# Patient Record
Sex: FEMALE | Race: WHITE | NOT HISPANIC OR LATINO | Employment: UNEMPLOYED | URBAN - METROPOLITAN AREA
[De-identification: names, ages, dates, MRNs, and addresses within clinical notes are randomized per-mention and may not be internally consistent; named-entity substitution may affect disease eponyms.]

---

## 2017-01-16 ENCOUNTER — OFFICE VISIT - HEALTHEAST (OUTPATIENT)
Dept: BEHAVIORAL HEALTH | Facility: CLINIC | Age: 57
End: 2017-01-16

## 2017-01-16 DIAGNOSIS — F06.30 MOOD DISORDER IN CONDITIONS CLASSIFIED ELSEWHERE: ICD-10-CM

## 2017-02-13 ENCOUNTER — COMMUNICATION - HEALTHEAST (OUTPATIENT)
Dept: BEHAVIORAL HEALTH | Facility: CLINIC | Age: 57
End: 2017-02-13

## 2017-02-13 DIAGNOSIS — F06.30 MOOD DISORDER IN CONDITIONS CLASSIFIED ELSEWHERE: ICD-10-CM

## 2017-02-13 DIAGNOSIS — F41.9 ANXIETY: ICD-10-CM

## 2017-02-14 ENCOUNTER — COMMUNICATION - HEALTHEAST (OUTPATIENT)
Dept: BEHAVIORAL HEALTH | Facility: CLINIC | Age: 57
End: 2017-02-14

## 2017-02-14 DIAGNOSIS — F41.9 ANXIETY: ICD-10-CM

## 2017-03-20 ENCOUNTER — COMMUNICATION - HEALTHEAST (OUTPATIENT)
Dept: BEHAVIORAL HEALTH | Facility: CLINIC | Age: 57
End: 2017-03-20

## 2017-03-20 DIAGNOSIS — F41.9 ANXIETY: ICD-10-CM

## 2017-06-26 ENCOUNTER — OFFICE VISIT - HEALTHEAST (OUTPATIENT)
Dept: BEHAVIORAL HEALTH | Facility: CLINIC | Age: 57
End: 2017-06-26

## 2017-06-26 DIAGNOSIS — F06.30 MOOD DISORDER IN CONDITIONS CLASSIFIED ELSEWHERE: ICD-10-CM

## 2017-06-26 ASSESSMENT — MIFFLIN-ST. JEOR: SCORE: 1381.43

## 2017-07-20 ENCOUNTER — COMMUNICATION - HEALTHEAST (OUTPATIENT)
Dept: BEHAVIORAL HEALTH | Facility: CLINIC | Age: 57
End: 2017-07-20

## 2017-07-20 DIAGNOSIS — F41.9 ANXIETY: ICD-10-CM

## 2017-09-08 ENCOUNTER — COMMUNICATION - HEALTHEAST (OUTPATIENT)
Dept: BEHAVIORAL HEALTH | Facility: CLINIC | Age: 57
End: 2017-09-08

## 2017-09-08 DIAGNOSIS — F41.9 ANXIETY: ICD-10-CM

## 2017-09-25 ENCOUNTER — OFFICE VISIT - HEALTHEAST (OUTPATIENT)
Dept: BEHAVIORAL HEALTH | Facility: CLINIC | Age: 57
End: 2017-09-25

## 2017-09-25 DIAGNOSIS — F06.30 MOOD DISORDER IN CONDITIONS CLASSIFIED ELSEWHERE: ICD-10-CM

## 2018-01-09 ENCOUNTER — COMMUNICATION - HEALTHEAST (OUTPATIENT)
Dept: BEHAVIORAL HEALTH | Facility: CLINIC | Age: 58
End: 2018-01-09

## 2018-01-09 DIAGNOSIS — F41.9 ANXIETY: ICD-10-CM

## 2018-01-10 ENCOUNTER — COMMUNICATION - HEALTHEAST (OUTPATIENT)
Dept: BEHAVIORAL HEALTH | Facility: CLINIC | Age: 58
End: 2018-01-10

## 2018-01-10 DIAGNOSIS — F41.9 ANXIETY: ICD-10-CM

## 2018-03-07 ENCOUNTER — COMMUNICATION - HEALTHEAST (OUTPATIENT)
Dept: BEHAVIORAL HEALTH | Facility: CLINIC | Age: 58
End: 2018-03-07

## 2018-03-07 DIAGNOSIS — F41.9 ANXIETY: ICD-10-CM

## 2018-03-26 ENCOUNTER — OFFICE VISIT - HEALTHEAST (OUTPATIENT)
Dept: BEHAVIORAL HEALTH | Facility: CLINIC | Age: 58
End: 2018-03-26

## 2018-03-26 DIAGNOSIS — F06.30 MOOD DISORDER IN CONDITIONS CLASSIFIED ELSEWHERE: ICD-10-CM

## 2018-06-07 ENCOUNTER — COMMUNICATION - HEALTHEAST (OUTPATIENT)
Dept: BEHAVIORAL HEALTH | Facility: CLINIC | Age: 58
End: 2018-06-07

## 2018-06-07 DIAGNOSIS — F41.9 ANXIETY: ICD-10-CM

## 2018-07-06 ENCOUNTER — COMMUNICATION - HEALTHEAST (OUTPATIENT)
Dept: BEHAVIORAL HEALTH | Facility: CLINIC | Age: 58
End: 2018-07-06

## 2018-07-06 DIAGNOSIS — F41.9 ANXIETY: ICD-10-CM

## 2018-07-26 ENCOUNTER — COMMUNICATION - HEALTHEAST (OUTPATIENT)
Dept: BEHAVIORAL HEALTH | Facility: CLINIC | Age: 58
End: 2018-07-26

## 2018-07-26 DIAGNOSIS — F06.30 MOOD DISORDER IN CONDITIONS CLASSIFIED ELSEWHERE: ICD-10-CM

## 2018-08-22 ENCOUNTER — COMMUNICATION - HEALTHEAST (OUTPATIENT)
Dept: BEHAVIORAL HEALTH | Facility: CLINIC | Age: 58
End: 2018-08-22

## 2018-08-22 DIAGNOSIS — F06.30 MOOD DISORDER IN CONDITIONS CLASSIFIED ELSEWHERE: ICD-10-CM

## 2018-09-07 ENCOUNTER — COMMUNICATION - HEALTHEAST (OUTPATIENT)
Dept: BEHAVIORAL HEALTH | Facility: CLINIC | Age: 58
End: 2018-09-07

## 2018-09-07 DIAGNOSIS — F06.30 MOOD DISORDER IN CONDITIONS CLASSIFIED ELSEWHERE: ICD-10-CM

## 2018-09-24 ENCOUNTER — OFFICE VISIT - HEALTHEAST (OUTPATIENT)
Dept: BEHAVIORAL HEALTH | Facility: CLINIC | Age: 58
End: 2018-09-24

## 2018-09-24 DIAGNOSIS — F41.9 ANXIETY: ICD-10-CM

## 2018-09-24 DIAGNOSIS — F06.30 MOOD DISORDER IN CONDITIONS CLASSIFIED ELSEWHERE: ICD-10-CM

## 2018-09-24 ASSESSMENT — MIFFLIN-ST. JEOR: SCORE: 1363.28

## 2018-10-03 ENCOUNTER — COMMUNICATION - HEALTHEAST (OUTPATIENT)
Dept: BEHAVIORAL HEALTH | Facility: CLINIC | Age: 58
End: 2018-10-03

## 2018-10-03 DIAGNOSIS — F06.30 MOOD DISORDER IN CONDITIONS CLASSIFIED ELSEWHERE: ICD-10-CM

## 2018-10-08 ENCOUNTER — COMMUNICATION - HEALTHEAST (OUTPATIENT)
Dept: BEHAVIORAL HEALTH | Facility: CLINIC | Age: 58
End: 2018-10-08

## 2018-10-08 DIAGNOSIS — F41.9 ANXIETY: ICD-10-CM

## 2018-12-10 ENCOUNTER — COMMUNICATION - HEALTHEAST (OUTPATIENT)
Dept: BEHAVIORAL HEALTH | Facility: CLINIC | Age: 58
End: 2018-12-10

## 2019-01-07 ENCOUNTER — OFFICE VISIT - HEALTHEAST (OUTPATIENT)
Dept: BEHAVIORAL HEALTH | Facility: CLINIC | Age: 59
End: 2019-01-07

## 2019-01-07 DIAGNOSIS — F41.9 ANXIETY: ICD-10-CM

## 2019-01-07 DIAGNOSIS — F06.30 MOOD DISORDER IN CONDITIONS CLASSIFIED ELSEWHERE: ICD-10-CM

## 2019-01-07 ASSESSMENT — MIFFLIN-ST. JEOR: SCORE: 1336.07

## 2019-01-09 ENCOUNTER — COMMUNICATION - HEALTHEAST (OUTPATIENT)
Dept: BEHAVIORAL HEALTH | Facility: CLINIC | Age: 59
End: 2019-01-09

## 2019-01-24 ENCOUNTER — COMMUNICATION - HEALTHEAST (OUTPATIENT)
Dept: SCHEDULING | Facility: CLINIC | Age: 59
End: 2019-01-24

## 2019-01-24 DIAGNOSIS — R06.2 WHEEZING: ICD-10-CM

## 2019-03-01 ENCOUNTER — COMMUNICATION - HEALTHEAST (OUTPATIENT)
Dept: BEHAVIORAL HEALTH | Facility: CLINIC | Age: 59
End: 2019-03-01

## 2019-03-01 DIAGNOSIS — F06.30 MOOD DISORDER IN CONDITIONS CLASSIFIED ELSEWHERE: ICD-10-CM

## 2019-04-08 ENCOUNTER — OFFICE VISIT - HEALTHEAST (OUTPATIENT)
Dept: BEHAVIORAL HEALTH | Facility: CLINIC | Age: 59
End: 2019-04-08

## 2019-04-08 DIAGNOSIS — F06.30 MOOD DISORDER IN CONDITIONS CLASSIFIED ELSEWHERE: ICD-10-CM

## 2019-04-08 DIAGNOSIS — F41.9 ANXIETY: ICD-10-CM

## 2019-04-08 ASSESSMENT — MIFFLIN-ST. JEOR: SCORE: 1263.49

## 2019-06-03 ENCOUNTER — COMMUNICATION - HEALTHEAST (OUTPATIENT)
Dept: BEHAVIORAL HEALTH | Facility: CLINIC | Age: 59
End: 2019-06-03

## 2019-06-03 DIAGNOSIS — F39 MOOD DISORDER (H): ICD-10-CM

## 2019-06-27 ENCOUNTER — COMMUNICATION - HEALTHEAST (OUTPATIENT)
Dept: BEHAVIORAL HEALTH | Facility: CLINIC | Age: 59
End: 2019-06-27

## 2019-06-27 DIAGNOSIS — F41.9 ANXIETY: ICD-10-CM

## 2019-07-09 ENCOUNTER — COMMUNICATION - HEALTHEAST (OUTPATIENT)
Dept: BEHAVIORAL HEALTH | Facility: CLINIC | Age: 59
End: 2019-07-09

## 2019-07-09 DIAGNOSIS — F41.9 ANXIETY: ICD-10-CM

## 2019-07-15 ENCOUNTER — OFFICE VISIT - HEALTHEAST (OUTPATIENT)
Dept: BEHAVIORAL HEALTH | Facility: CLINIC | Age: 59
End: 2019-07-15

## 2019-07-15 ENCOUNTER — AMBULATORY - HEALTHEAST (OUTPATIENT)
Dept: OTHER | Facility: CLINIC | Age: 59
End: 2019-07-15

## 2019-07-15 ENCOUNTER — DOCUMENTATION ONLY (OUTPATIENT)
Dept: OTHER | Facility: CLINIC | Age: 59
End: 2019-07-15

## 2019-07-15 DIAGNOSIS — F06.30 MOOD DISORDER IN CONDITIONS CLASSIFIED ELSEWHERE: ICD-10-CM

## 2019-07-15 DIAGNOSIS — F39 MOOD DISORDER (H): ICD-10-CM

## 2019-07-15 DIAGNOSIS — F41.9 ANXIETY: ICD-10-CM

## 2019-07-15 ASSESSMENT — MIFFLIN-ST. JEOR: SCORE: 1145.56

## 2019-07-16 ENCOUNTER — COMMUNICATION - HEALTHEAST (OUTPATIENT)
Dept: BEHAVIORAL HEALTH | Facility: CLINIC | Age: 59
End: 2019-07-16

## 2019-07-16 DIAGNOSIS — F39 MOOD DISORDER (H): ICD-10-CM

## 2019-08-12 ENCOUNTER — COMMUNICATION - HEALTHEAST (OUTPATIENT)
Dept: NEUROLOGY | Facility: CLINIC | Age: 59
End: 2019-08-12

## 2019-08-12 ENCOUNTER — COMMUNICATION - HEALTHEAST (OUTPATIENT)
Dept: BEHAVIORAL HEALTH | Facility: CLINIC | Age: 59
End: 2019-08-12

## 2019-08-12 DIAGNOSIS — F41.9 ANXIETY: ICD-10-CM

## 2019-08-13 ENCOUNTER — COMMUNICATION - HEALTHEAST (OUTPATIENT)
Dept: BEHAVIORAL HEALTH | Facility: CLINIC | Age: 59
End: 2019-08-13

## 2019-08-13 DIAGNOSIS — F41.9 ANXIETY: ICD-10-CM

## 2019-09-05 ENCOUNTER — COMMUNICATION - HEALTHEAST (OUTPATIENT)
Dept: BEHAVIORAL HEALTH | Facility: CLINIC | Age: 59
End: 2019-09-05

## 2019-09-05 DIAGNOSIS — F41.9 ANXIETY: ICD-10-CM

## 2019-10-14 ENCOUNTER — OFFICE VISIT - HEALTHEAST (OUTPATIENT)
Dept: BEHAVIORAL HEALTH | Facility: CLINIC | Age: 59
End: 2019-10-14

## 2019-10-14 DIAGNOSIS — F39 MOOD DISORDER (H): ICD-10-CM

## 2019-10-14 ASSESSMENT — MIFFLIN-ST. JEOR: SCORE: 1163.7

## 2019-12-20 ENCOUNTER — COMMUNICATION - HEALTHEAST (OUTPATIENT)
Dept: BEHAVIORAL HEALTH | Facility: CLINIC | Age: 59
End: 2019-12-20

## 2019-12-20 DIAGNOSIS — F41.9 ANXIETY: ICD-10-CM

## 2019-12-28 ENCOUNTER — COMMUNICATION - HEALTHEAST (OUTPATIENT)
Dept: BEHAVIORAL HEALTH | Facility: CLINIC | Age: 59
End: 2019-12-28

## 2019-12-28 DIAGNOSIS — F06.30 MOOD DISORDER IN CONDITIONS CLASSIFIED ELSEWHERE: ICD-10-CM

## 2019-12-30 ENCOUNTER — COMMUNICATION - HEALTHEAST (OUTPATIENT)
Dept: BEHAVIORAL HEALTH | Facility: CLINIC | Age: 59
End: 2019-12-30

## 2019-12-30 DIAGNOSIS — F41.9 ANXIETY: ICD-10-CM

## 2020-01-06 ENCOUNTER — COMMUNICATION - HEALTHEAST (OUTPATIENT)
Dept: BEHAVIORAL HEALTH | Facility: CLINIC | Age: 60
End: 2020-01-06

## 2020-01-06 DIAGNOSIS — F41.9 ANXIETY: ICD-10-CM

## 2020-01-25 ENCOUNTER — COMMUNICATION - HEALTHEAST (OUTPATIENT)
Dept: BEHAVIORAL HEALTH | Facility: CLINIC | Age: 60
End: 2020-01-25

## 2020-01-25 DIAGNOSIS — F06.30 MOOD DISORDER IN CONDITIONS CLASSIFIED ELSEWHERE: ICD-10-CM

## 2020-04-08 ENCOUNTER — COMMUNICATION - HEALTHEAST (OUTPATIENT)
Dept: BEHAVIORAL HEALTH | Facility: CLINIC | Age: 60
End: 2020-04-08

## 2020-04-13 ENCOUNTER — COMMUNICATION - HEALTHEAST (OUTPATIENT)
Dept: BEHAVIORAL HEALTH | Facility: CLINIC | Age: 60
End: 2020-04-13

## 2020-04-13 DIAGNOSIS — F39 MOOD DISORDER (H): ICD-10-CM

## 2020-07-07 ENCOUNTER — COMMUNICATION - HEALTHEAST (OUTPATIENT)
Dept: BEHAVIORAL HEALTH | Facility: CLINIC | Age: 60
End: 2020-07-07

## 2020-07-07 DIAGNOSIS — F41.9 ANXIETY: ICD-10-CM

## 2020-07-08 ENCOUNTER — COMMUNICATION - HEALTHEAST (OUTPATIENT)
Dept: BEHAVIORAL HEALTH | Facility: CLINIC | Age: 60
End: 2020-07-08

## 2020-07-08 DIAGNOSIS — F41.9 ANXIETY: ICD-10-CM

## 2020-07-13 ENCOUNTER — OFFICE VISIT - HEALTHEAST (OUTPATIENT)
Dept: BEHAVIORAL HEALTH | Facility: CLINIC | Age: 60
End: 2020-07-13

## 2020-07-13 DIAGNOSIS — F06.30 MOOD DISORDER IN CONDITIONS CLASSIFIED ELSEWHERE: ICD-10-CM

## 2020-07-13 DIAGNOSIS — F39 MOOD DISORDER (H): ICD-10-CM

## 2020-07-13 DIAGNOSIS — F41.9 ANXIETY: ICD-10-CM

## 2020-09-14 ENCOUNTER — OFFICE VISIT - HEALTHEAST (OUTPATIENT)
Dept: BEHAVIORAL HEALTH | Facility: CLINIC | Age: 60
End: 2020-09-14

## 2020-09-14 ENCOUNTER — AMBULATORY - HEALTHEAST (OUTPATIENT)
Dept: MULTI SPECIALTY CLINIC | Facility: CLINIC | Age: 60
End: 2020-09-14

## 2020-09-14 DIAGNOSIS — E78.01 FAMILIAL HYPERCHOLESTEROLEMIA: ICD-10-CM

## 2020-09-14 DIAGNOSIS — F39 MOOD DISORDER (H): ICD-10-CM

## 2020-09-23 ENCOUNTER — AMBULATORY - HEALTHEAST (OUTPATIENT)
Dept: MULTI SPECIALTY CLINIC | Facility: CLINIC | Age: 60
End: 2020-09-23

## 2020-09-23 LAB
ALT SERPL W/O P-5'-P-CCNC: 18 IU/L (ref 8–45)
AST SERPL-CCNC: 28 IU/L (ref 2–40)
CHOLEST SERPL-MCNC: 237 MG/DL (ref 100–199)
CREAT SERPL-MCNC: 0.63 MG/DL (ref 0.57–1.11)
GFR ESTIMATE EXT - HISTORICAL: >60 ML/MIN/1.73M2
GFR ESTIMATE, IF BLACK EXT - HISTORICAL: >60 ML/MIN/1.73M2
HDLC SERPL-MCNC: 67 MG/DL
LDLC SERPL CALC-MCNC: 137 MG/DL
NON HDL CHOL. (LDL+VLDL): 170
TRIGLYCERIDES (HISTORICAL CONVERSION): 167 MG/DL

## 2020-09-24 ENCOUNTER — COMMUNICATION - HEALTHEAST (OUTPATIENT)
Dept: BEHAVIORAL HEALTH | Facility: CLINIC | Age: 60
End: 2020-09-24

## 2021-01-29 ENCOUNTER — MEDICAL CORRESPONDENCE (OUTPATIENT)
Dept: HEALTH INFORMATION MANAGEMENT | Facility: CLINIC | Age: 61
End: 2021-01-29
Payer: MEDICARE

## 2021-03-15 ENCOUNTER — OFFICE VISIT - HEALTHEAST (OUTPATIENT)
Dept: BEHAVIORAL HEALTH | Facility: CLINIC | Age: 61
End: 2021-03-15

## 2021-03-15 DIAGNOSIS — F06.30 MOOD DISORDER IN CONDITIONS CLASSIFIED ELSEWHERE: ICD-10-CM

## 2021-03-15 DIAGNOSIS — F39 MOOD DISORDER (H): ICD-10-CM

## 2021-03-15 DIAGNOSIS — F41.9 ANXIETY: ICD-10-CM

## 2021-05-18 ENCOUNTER — COMMUNICATION - HEALTHEAST (OUTPATIENT)
Dept: BEHAVIORAL HEALTH | Facility: CLINIC | Age: 61
End: 2021-05-18

## 2021-05-18 DIAGNOSIS — F41.9 ANXIETY: ICD-10-CM

## 2021-05-27 NOTE — PROGRESS NOTES
Correct pharmacy verified with patient and confirmed in snapshot? [x] yes []no    Charge captured ? [x] yes  [] no    Medications Phoned  to Pharmacy [] yes [x]no  Name of Pharmacist:  List Medications, including dose, quantity and instructions      Medication Prescriptions given to patient   [] yes  [x] no   List the name of the drug the prescription was written for.       Medications ordered this visit were e-scribed.  Verified by order class [x] yes  [] no  Klonopin 0.5 mg; melatonin 10 mg   Medication changes or discontinuations were communicated to patient's pharmacy: [] yes  [x] no    UA collected [] yes  [x] no    [place] Prescription Monitoring Program Reviewed? [x] yes  [] no    Referrals were made to:  None     Future appointment was made: [] yes  [x] no    Dictation completed at time of chart check: [x] yes  [] no    I have checked the documentation for today s encounters and the above information has been reviewed and completed.

## 2021-05-27 NOTE — PROGRESS NOTES
Outpatient Followup Psychiatric Evaluation      Pertinent History: She has a long-standing history of developmental delay as well as atypical psychosis and a history of very significant behavioral dyscontrol and mood instability.  Over the winter 2018 we did increase the Remeron to 30 mg.      I saw the patient in September 2018.  At that time we increased the patient's Risperdal as well as the patient's melatonin due to some insomnia and behavioral difficulties.    I saw the patient in January 2018.  At that time we did taper off the patient's Risperdal over the next couple of weeks due to some ongoing behavioral difficulties despite that trial.  She had had a seizure in September and frequent urinary tract infections since then which may have contributed.  She was staying in bed much of the day despite sleeping well at night and was combative with redirection at that time.       Current Symptoms:   The patient is unable to provide any history but 2 staff members accompany the patient today.  They report the patient continues to be restless and difficult to redirect agitated at times.  Since the discontinuation of the Risperdal that has not improved.  What has changed is she is more alert during the day.  She will occasionally go lay down in her room for 45 minutes or so but she is not spending the day trying to go back to bed as she was before.  There is been no significant change in cognition.  She seems to be sleeping well at night.  No obvious psychosis.  No self-injurious behaviors but she continues to be quite active.  She seems to be tolerating the medication with no obvious side effects.  There is been no new medical issues.  There is been no recent UTIs as there were last fall.    We discussed the possibility of trying another mood stabilizing agent.  The staff present would like to give the patient a bit more time off the Risperdal and regroup with us in 3 months.  At this time we will continue with the  Celexa and the Klonopin.      Current Medications: Please see chart. Medications personally reviewed.    Medication Compliance: YES    Side Effects to Medications:  NO      Vitals:  Wt Readings from Last 3 Encounters:   04/08/19 156 lb (70.8 kg)   01/24/19 174 lb 12.8 oz (79.3 kg)   01/18/19 171 lb 8 oz (77.8 kg)     Temp Readings from Last 3 Encounters:   01/24/19 97.8  F (36.6  C) (Oral)   01/22/19 98.8  F (37.1  C) (Axillary)   01/06/15 97.6  F (36.4  C) (Axillary)     BP Readings from Last 3 Encounters:   01/24/19 136/66   01/22/19 115/86   09/24/18 104/52     Pulse Readings from Last 3 Encounters:   01/24/19 85   01/22/19 84   09/24/18 72         Mental Status Exam:   Appearance: The patient is inattentive and not able to participate.  He was quite restless and constantly got up pacing around the room.  She was difficult to redirect.  She overturned the magazines and ripped some paper.  She tried to grab various items on the desk and elsewhere in the room.  No obvious pain.  No shortness of breath. No restlessness. The patient does not respond to voice. No obvious shortness of breath.  Behavior:  The patient does not appear to be uncomfortable. Not able to participate.  Quite restless.  No reports of any recent significant change in behavior.  Difficult to redirect.  Speech:  Not able to participate at this time. The patient does not respond to voice.  Occasionally vocalizes but not in an attempt to communicate and not recognizable.  The patient makes no attempts to communicate.  Mood/Affect:  Flat and slow.  The patient does not appear to be agitated. No obvious anxiety. No lability at this time.  Thought Content:  No evidence of psychosis. No reports of any recent psychosis.  Suicidal or Homicidal Thoughts:  The patient is not able to participate. No apparent or reported evidence of any suicidal or homicidal ideation.  Thought Process/Formulation:  Inattentive.  Distractible and disinterested.  Not able to  participate.   Associations:  Inattentive. Not able to participate.  Fund of Knowledge:  Not able to participate. No reports of any recent significant change.  Attention/Concentration:  Inattentive. No participation.  Insight:  Not able to participate.  Reportedly continues impaired with no reports of any recent change.   Judgement:  Not able to participate at this time.  Reportedly continues impaired.   Memory:  No participation.   Motor Status:  Currently not able to participate. No tremor.   Orientation: Not able to participate.  Continues impaired by report.  Not able to demonstrate orientation.      Diagnosis managed and treated at today's visit :    Major neurocognitive disorder disorder, NOS with resultant mood disturbance and behavioral dyscontrol.  Atypical psychosis    Developmental delay, by history     Plan:  Medication Adjustment:  We will continue the patient off Risperdal at this time.  We will make no changes and see how she does over the course of the next couple of months.  I am considering a trial of Abilify.    Other:   Patient will return to clinic in 3 months for med check.  The staff agree to call or return sooner if questions concerns or problems arise.      Continue with the support of the clinic, reassurance, and redirection. Staff monitoring and ongoing assessments per team plan. Current psychotropic medication appears to represent the minimum effective dosage and appears medically necessary. We will continue to monitor and reassess. This team will utilize appropriate emergency services if necessary. I will make myself available if concerns or problems arise.    Casa Bustos MD

## 2021-05-27 NOTE — PROGRESS NOTES
Pt is here for psychiatric follow up and medication management.  Presents with 2 staff from [facility].    Pt was unable to sit still long enough to obtain vitals.  Did try.  Pt is non-verbal and is anxious in office setting.     Since last visit stopped taking Risperdal.   Sleep has improved during the night also taking daytime naps.  Pt has been more vocal and with increased voice volume.  Decreased desire to eat resulting in some weight lost per staff.  Increased thirst  Increased grabbing items that are not hers but no self injury behaviors observed.       [place] :  Unable to check activity.

## 2021-05-29 NOTE — TELEPHONE ENCOUNTER
[facility] was called and informed of addition of Abilify to patient's medication list. Order was sent to [facility].

## 2021-05-29 NOTE — TELEPHONE ENCOUNTER
Rylee, [facility] staff person, called requesting medication change. She said at patient's April appt, it was discussed that Abilify could be added. She said that the  team would like to see that added now and then patient be evaluated at July appt.with Dr Bustos. Instructed that request would be sent to .

## 2021-05-30 NOTE — TELEPHONE ENCOUNTER
Date of Last Office Visit:4-8-19  Date of Next Office Visit: 7-15-19  No shows since last visit: 0  Cancellations since last visit: 0  ED visits since last visit:  0  Medication celexa date last ordered: 1-7-19  Qty: 42  Refills: 10  Lapse in therapy greater than 7 days: no  Medication refill request verified as identical to current order: yes  Result of Last DAM, VPA, Li+ Level, CBC, or Carbamazepine Level (at or since last visit): N/A     [] Medication refilled per [facility] M-1.   [x] Medication unable to be refilled by RN due to criteria not met as indicated below:     []Eligibility - not seen in last year    []Supervision - no future appointment    []Compliance     []Verification - order discrepancy    []Controlled Medication    []Medication not included in RN Protocol    []90 - day supply request    [x]Other requesting additional refills   Current Medication list:    albuterol (ACCUNEB) 1.25 mg/3 mL nebulizer solution Take 3 mL (1.25 mg total) by nebulization 3 (three) times a day.   ARIPiprazole (ABILIFY) 2 MG tablet Take 1 tablet (2 mg total) by mouth daily.   calcium carbonate-vit D3-min (CALCIUM-VITAMIN D) 600 mg calcium- 400 unit Tab Take 1 tablet by mouth 2 (two) times a day.    carBAMazepine (TEGRETOL) 200 mg tablet Take 200 mg by mouth 2 (two) times a day. Take 1 tab at AM and 1500, and 1.5 tabs at bedtime        CHLORHEXIDINE GLUCONATE MM 1 application by Mucous Membrane route every morning.   ciprofloxacin-dexamethasone (CIPRODEX) otic suspension Administer 4 drops into both ears 2 (two) times a day as needed.        citalopram (CELEXA) 40 MG tablet TAKE ONE AND ONE-HALF TABLETS (60MG) BY MOUTH ONCE DAILY   clonazePAM (KLONOPIN) 0.5 MG tablet Take 1 tablet (0.5 mg total) by mouth at bedtime.   clotrimazole-betamethasone (LOTRISONE) cream Apply 1 application topically as needed.   cycloSPORINE (RESTASIS) 0.05 % ophthalmic emulsion Administer 1 drop to both eyes 2 (two) times a day.        diphenhydrAMINE  (BENADRYL) 25 mg capsule Take 25 mg by mouth every 6 (six) hours as needed for itching.        divalproex (DEPAKOTE) 500 MG EC tablet Take 1,000 mg by mouth 3 (three) times a day. AM, 1500, HS        docusate sodium (COLACE) 100 MG capsule Take 200 mg by mouth 2 (two) times a day.   esomeprazole (NEXIUM) 20 MG capsule Take 40 mg by mouth daily before breakfast.        hydrocortisone 1 % cream Apply 1 application topically 2 (two) times a day as needed.   levOCARNitine (CARNITOR) 330 mg tablet Take 330 mg by mouth 3 (three) times a day. AM, 1500, HS        LORazepam (ATIVAN) 2 MG tablet Take 2 mg by mouth as needed for anxiety (1 hour prior to procedures that would be upsetting.).   melatonin 10 mg Tab Take 10 mg by mouth at bedtime.   mirtazapine (REMERON) 30 MG tablet Take 1 tablet (30 mg total) by mouth at bedtime.   MULTIVIT WITH CALCIUM,IRON,MIN (TAB A TENZIN ORAL) Take 1 tablet by mouth daily.   senna (SENOKOT) 8.6 mg tablet Take 1 tablet by mouth 2 (two) times a day.   sodium fluoride (DENTAGEL) 1.1 % Gel dental gel Apply 1 application to teeth every morning.        zolpidem (AMBIEN) 10 mg tablet TAKE 1 TABLET BY MOUTH AT BEDTIME. *6 TOTAL FILLS*   Allergies     Drixoral   Sudafed [pseudoephedrine Hcl]   Preferred Pharmacy     [address] [facility]. [facility] [address]   Phone: [phone] Fax: [phone]   Not a 24 hour pharmacy; exact hours not known.       Medication Plan of Care at last office visit with MD/CNP:  We will continue the patient off Risperdal at this time.  We will make no changes and see how she does over the course of the next couple of months.  I am considering a trial of Abilify.     Other:   Patient will return to clinic in 3 months for med check.  The staff agree to call or return sooner if questions concerns or problems arise.       Continue with the support of the clinic, reassurance, and redirection. Staff monitoring  and ongoing assessments per team plan. Current psychotropic medication appears to represent the minimum effective dosage and appears medically necessary. We will continue to monitor and reassess. This team will utilize appropriate emergency services if necessary. I will make myself available if concerns or problems arise.     Casa Bustos MD

## 2021-05-30 NOTE — TELEPHONE ENCOUNTER
Prior authorization sent as urgent to [facility] for Aripiprazole 2 mg taking one tablet two times a day, for quantity limit.

## 2021-05-30 NOTE — PROGRESS NOTES
Correct pharmacy verified with patient and confirmed in snapshot? [x] yes []no    Charge captured ? [x] yes  [] no    Medications Phoned  to Pharmacy [] yes [x]no  Name of Pharmacist:  List Medications, including dose, quantity and instructions      Medication Prescriptions given to patient   [] yes  [x] no   List the name of the drug the prescription was written for.       Medications ordered this visit were e-scribed.  Verified by order class [x] yes  [] no  Abilify 2 mg; Klonopin 0.5 mg; Melatonin 10 mg   Medication changes or discontinuations were communicated to patient's pharmacy: [] yes  [x] no    UA collected [] yes  [x] no    [place] Prescription Monitoring Program Reviewed? [] yes  [x] no    Referrals were made to:  None    Future appointment was made: [x] yes  [] no   10/14/2019  Dictation completed at time of chart check: [x] yes  [] no    I have checked the documentation for today s encounters and the above information has been reviewed and completed.

## 2021-05-30 NOTE — PROGRESS NOTES
Outpatient Followup Psychiatric Evaluation      Pertinent History: She has a long-standing history of developmental delay as well as atypical psychosis and a history of very significant behavioral dyscontrol and mood instability.  Over the winter 2018 we did increase the Remeron to 30 mg.      I saw the patient in September 2018.  At that time we increased the patient's Risperdal as well as the patient's melatonin due to some insomnia and behavioral difficulties.    I saw the patient in January 2018.  At that time we did taper off the patient's Risperdal over the next couple of weeks due to some ongoing behavioral difficulties despite that trial.  She had had a seizure in September and frequent urinary tract infections since then which may have contributed.  She was staying in bed much of the day despite sleeping well at night and was combative with redirection at that time.    I saw the patient in May 2019.  At that time we continued with the Resporal.  We were considering a change to Abilify.  Over the phone in early June given the patient's ongoing difficulties we did discontinue the Risperdal and started the patient on low-dose Abilify.         Current Symptoms:   The patient is unable to provide any information or participate.  The staff present reports the patient has had some improvement in her appetite.  She continues to be quite restless and they wonder whether she has some mind racing or possible hypomania.  She is sleeping a bit better and at times will get 6 hours a night.  More typically she sleeps 2 to 3 hours per night.    Staff report no significant change but there is some noticeable improvement.  They believe that she spends less time taking things out of doors and throwing them.  She tends to seek less staff time and is able to redirect herself without so much staff involvement.  They have noted no side effects to the medication.  There is been no new medical diagnoses.  No new allergies.  We did  spend some time talking about the possible risks and benefits of the medication including the risk of tardive dyskinesia.  The patient has had chronic tardive dyskinesia due to past exposure they have noted no changes.  They will continue to monitor.    There is been no obvious psychosis.  There is been no self-injurious behaviors.  No significant change in her cognition.  They believe her mood might be slightly better.      Current Medications: Please see chart. Medications personally reviewed.    Medication Compliance: YES    Side Effects to Medications:  NO      Vitals:  Wt Readings from Last 3 Encounters:   07/15/19 130 lb (59 kg)   04/08/19 156 lb (70.8 kg)   01/24/19 174 lb 12.8 oz (79.3 kg)     Temp Readings from Last 3 Encounters:   01/24/19 97.8  F (36.6  C) (Oral)   01/22/19 98.8  F (37.1  C) (Axillary)   01/06/15 97.6  F (36.4  C) (Axillary)     BP Readings from Last 3 Encounters:   07/15/19 (!) 128/96   01/24/19 136/66   01/22/19 115/86     Pulse Readings from Last 3 Encounters:   07/15/19 (!) 57   01/24/19 85   01/22/19 84         Mental Status Exam:   Appearance: The patient is inattentive and not able to participate.  She does need frequent redirection to stay seated.  The patient appears relatively comfortable. No shortness of breath.  Baseline restlessness. The patient does not respond to voice. No obvious pain. No obvious shortness of breath.  Behavior:  The patient does not appear to be uncomfortable. Not able to participate. No restlessness or agitation. No reports of any recent significant change in behavior.  Speech:  Not able to participate at this time. The patient does not respond to voice. The patient makes no attempts to communicate.  Mood/Affect:  Flat and slow.  The patient does not appear to be agitated. No obvious anxiety. No lability at this time.  Thought Content:  No evidence of psychosis. No reports of any recent psychosis.  Suicidal or Homicidal Thoughts:  The patient is not able  to participate. No apparent or reported evidence of any suicidal or homicidal ideation.  Thought Process/Formulation:  Inattentive. Not able to participate. No evidence of any racing thoughts.  Associations:  Inattentive.  Distractible.  Not able to participate.  Fund of Knowledge:  Not able to participate. No reports of any recent significant change.  Attention/Concentration:  Inattentive. No participation.  Insight:  Not able to participate.  Reportedly continues impaired with no reports of any recent change.   Judgement:  Not able to participate at this time.  Reportedly continues impaired.   Memory:  No participation.   Motor Status:  Currently not able to participate. No tremor.   Orientation: Not able to participate.  Continues impaired by report.  Not able to demonstrate orientation.      Diagnosis managed and treated at today's visit :    Major neurocognitive disorder disorder, NOS with resultant mood disturbance and behavioral dyscontrol.  Atypical psychosis    Developmental delay, by history     Plan:  Medication Adjustment:  I am going to increase the patient's Abilify to 2 mg twice a day.  Risks and benefits were discussed.    Other:   Patient will return to clinic in 3 months for med check.  If patient is tolerating the medication but has had no significant improvement in behaviors we will consider an increase in the medication prior to the next appointment.  The staff stated they would call us should they desire that.  The staff agree to call or return sooner if questions concerns or problems arise.      Continue with the support of the clinic, reassurance, and redirection. Staff monitoring and ongoing assessments per team plan. Current psychotropic medication appears to represent the minimum effective dosage and appears medically necessary. We will continue to monitor and reassess. This team will utilize appropriate emergency services if necessary. I will make myself available if concerns or problems  arise.    Casa Bustos MD

## 2021-05-30 NOTE — TELEPHONE ENCOUNTER
Spoke to Sherry and went through all of the directives and reasoning, and she understood.  She said they do have one dose left of the Aripiprazole 2 mg that they will give tonight, and then start the 5 mg dose tomorrow.

## 2021-05-30 NOTE — TELEPHONE ENCOUNTER
[facility] refusing the request for Aripiprazole 2 mg taking two times a day.  They will only approve one tablet daily, as they will not approve two tablets daily, so it would have to be ordered as Aripiprazole 5 mg once daily to be approved.

## 2021-05-30 NOTE — PROGRESS NOTES
Pt is here for psychiatric follow up and medication management.  Pt is here with staff x 2.  Primary staff gave update related to sleep. Since adding the Abilify pt's appetite has increased but her sleep is still troublesome sleeping some nights for 6 hours and other nights about 2-3 hours then up for several and hard to redirect at times.   Staff above thinking about a possible manic episode?    [place] :  Unable to verify activity at this time.

## 2021-05-30 NOTE — TELEPHONE ENCOUNTER
Date of Last Office Visit: 4/8/19  Date of Next Office Visit: 7/15/19  No shows since last visit: no  Cancellations since last visit: no  ED visits since last visit: no    Medication Ambien 10 mg date last ordered: 1/7/19  Qty: 31  Refills: 5    zolpidem (AMBIEN) 10 mg tablet 31 tablet 5 1/7/2019     Sig: TAKE 1 TABLET BY MOUTH AT BEDTIME **6 TOTAL FILLS*        Lapse in therapy greater than 7 days: no  Medication refill request verified as identical to current order: yes  Result of Last DAM, VPA, Li+ Level, CBC, or Carbamazepine Level (at or since last visit): N/A        []Eligibility - not seen in last year    []Supervision - no future appointment    []Compliance     []Verification - order discrepancy    [x]Controlled Medication    []90 - day supply request    [x]Other LPN pending medications    Current Medication list:    albuterol (ACCUNEB) 1.25 mg/3 mL nebulizer solution Take 3 mL (1.25 mg total) by nebulization 3 (three) times a day.   ARIPiprazole (ABILIFY) 2 MG tablet Take 1 tablet (2 mg total) by mouth daily.   calcium carbonate-vit D3-min (CALCIUM-VITAMIN D) 600 mg calcium- 400 unit Tab Take 1 tablet by mouth 2 (two) times a day.    carBAMazepine (TEGRETOL) 200 mg tablet Take 200 mg by mouth 2 (two) times a day. Take 1 tab at AM and 1500, and 1.5 tabs at bedtime        CHLORHEXIDINE GLUCONATE MM 1 application by Mucous Membrane route every morning.   ciprofloxacin-dexamethasone (CIPRODEX) otic suspension Administer 4 drops into both ears 2 (two) times a day as needed.        citalopram (CELEXA) 40 MG tablet TAKE ONE AND ONE-HALF TABLETS (60MG) BY MOUTH ONCE DAILY   clonazePAM (KLONOPIN) 0.5 MG tablet Take 1 tablet (0.5 mg total) by mouth at bedtime.   clotrimazole-betamethasone (LOTRISONE) cream Apply 1 application topically as needed.   cycloSPORINE (RESTASIS) 0.05 % ophthalmic emulsion Administer 1 drop to both eyes 2 (two) times a day.        diphenhydrAMINE (BENADRYL) 25 mg capsule Take 25 mg by mouth  every 6 (six) hours as needed for itching.        divalproex (DEPAKOTE) 500 MG EC tablet Take 1,000 mg by mouth 3 (three) times a day. AM, 1500, HS        docusate sodium (COLACE) 100 MG capsule Take 200 mg by mouth 2 (two) times a day.   esomeprazole (NEXIUM) 20 MG capsule Take 40 mg by mouth daily before breakfast.        hydrocortisone 1 % cream Apply 1 application topically 2 (two) times a day as needed.   levOCARNitine (CARNITOR) 330 mg tablet Take 330 mg by mouth 3 (three) times a day. AM, 1500, HS        LORazepam (ATIVAN) 2 MG tablet Take 2 mg by mouth as needed for anxiety (1 hour prior to procedures that would be upsetting.).   melatonin 10 mg Tab Take 10 mg by mouth at bedtime.   mirtazapine (REMERON) 30 MG tablet Take 1 tablet (30 mg total) by mouth at bedtime.   MULTIVIT WITH CALCIUM,IRON,MIN (TAB A TENZIN ORAL) Take 1 tablet by mouth daily.   senna (SENOKOT) 8.6 mg tablet Take 1 tablet by mouth 2 (two) times a day.   sodium fluoride (DENTAGEL) 1.1 % Gel dental gel Apply 1 application to teeth every morning.        zolpidem (AMBIEN) 10 mg tablet TAKE 1 TABLET BY MOUTH AT BEDTIME **6 TOTAL FILLS*         Medication Plan of Care at last office visit with MD/CNP:    PLAN:    Medication Adjustment:  We will continue the patient off Risperdal at this time.  We will make no changes and see how she does over the course of the next couple of months.  I am considering a trial of Abilify.     Other:   Patient will return to clinic in 3 months for med check.  The staff agree to call or return sooner if questions concerns or problems arise.     [place], [place], [place], and [place] : Unable to review due to no access. Provider needs to re enroll to  and/or delegate access

## 2021-05-30 NOTE — TELEPHONE ENCOUNTER
Dr. Bustos ordered the Aripiprazole 5 mg, and discontinued the 2 mg dose.  Call placed to the group home.  They stated she received the am dose, already, of the Aripiprazole 2 mg.  This writer was asked to call back at 1030 and speak to the  to discuss the medication change.  Michell will take one more dose of the Aripiprazole 2 mg later today, and then it will be discontinued, and she will start the Aripiprazole 5 mg tomorrow morning and will take that once daily.  [facility] placed to [facility] to explain to Taye, the pharmacist, all of the above.  He believes that the group home should have one more dose of the Aripiprazole 2 mg, but if not, they just need to contact [facility] and they will get that out to them.  He discontinued the Aripiprazole 2 mg taking two times a day, starting after evening dose tonight. Discontinued order and new order were faxed to the group home.  Paperwork from PA that was refused was sent for scanning into Epic.

## 2021-05-31 VITALS — BODY MASS INDEX: 30.29 KG/M2 | HEIGHT: 65 IN

## 2021-05-31 VITALS — BODY MASS INDEX: 30.32 KG/M2 | WEIGHT: 182 LBS | HEIGHT: 65 IN

## 2021-06-01 VITALS — HEIGHT: 65 IN | BODY MASS INDEX: 29.66 KG/M2 | WEIGHT: 178 LBS

## 2021-06-01 NOTE — TELEPHONE ENCOUNTER
Date of Last Office Visit: 7/15/19  Date of Next Office Visit: 10/14/19  No shows since last visit: no  Cancellations since last visit: no  ED visits since last visit: no    Medication Klonopin 0.5 mg date last ordered: 8/13/19  Qty: 30  Refills: 0    clonazePAM (KLONOPIN) 0.5 MG tablet 30 tablet 0 8/13/2019     Sig - Route: Take 1 tablet (0.5 mg total) by mouth at bedtime. - Oral        Lapse in therapy greater than 7 days: no  Medication refill request verified as identical to current order: yes  Result of Last DAM, VPA, Li+ Level, CBC, or Carbamazepine Level (at or since last visit): N/A        []Eligibility - not seen in last year    []Supervision - no future appointment    []Compliance     []Verification - order discrepancy    [x]Controlled Medication    []90 - day supply request    [x]Other LPN pending medications    Current Medication list:    albuterol (ACCUNEB) 1.25 mg/3 mL nebulizer solution Take 3 mL (1.25 mg total) by nebulization 3 (three) times a day.   ARIPiprazole (ABILIFY) 5 MG tablet Take 1 tablet (5 mg total) by mouth daily.   calcium carbonate-vit D3-min (CALCIUM-VITAMIN D) 600 mg calcium- 400 unit Tab Take 1 tablet by mouth 2 (two) times a day.    carBAMazepine (TEGRETOL) 200 mg tablet Take 200 mg by mouth 2 (two) times a day. Take 1 tab at AM and 1500, and 1.5 tabs at bedtime        CHLORHEXIDINE GLUCONATE MM 1 application by Mucous Membrane route every morning.   ciprofloxacin-dexamethasone (CIPRODEX) otic suspension Administer 4 drops into both ears 2 (two) times a day as needed.        citalopram (CELEXA) 40 MG tablet TAKE ONE AND ONE-HALF TABLETS (60MG) BY MOUTH ONCE DAILY.   clonazePAM (KLONOPIN) 0.5 MG tablet Take 1 tablet (0.5 mg total) by mouth at bedtime.   clotrimazole-betamethasone (LOTRISONE) cream Apply 1 application topically as needed.   cycloSPORINE (RESTASIS) 0.05 % ophthalmic emulsion Administer 1 drop to both eyes 2 (two) times a day.        diphenhydrAMINE (BENADRYL) 25 mg  capsule Take 25 mg by mouth every 6 (six) hours as needed for itching.        divalproex (DEPAKOTE) 500 MG EC tablet Take 1,000 mg by mouth 3 (three) times a day. AM, 1500, HS        docusate sodium (COLACE) 100 MG capsule Take 200 mg by mouth 2 (two) times a day.   esomeprazole (NEXIUM) 20 MG capsule Take 40 mg by mouth daily before breakfast.        hydrocortisone 1 % cream Apply 1 application topically 2 (two) times a day as needed.   levOCARNitine (CARNITOR) 330 mg tablet Take 330 mg by mouth 3 (three) times a day. AM, 1500, HS        LORazepam (ATIVAN) 2 MG tablet Take 2 mg by mouth as needed for anxiety (1 hour prior to procedures that would be upsetting.).   melatonin 10 mg Tab Take 10 mg by mouth at bedtime.   mirtazapine (REMERON) 30 MG tablet Take 1 tablet (30 mg total) by mouth at bedtime.   MULTIVIT WITH CALCIUM,IRON,MIN (TAB A TENZIN ORAL) Take 1 tablet by mouth daily.   senna (SENOKOT) 8.6 mg tablet Take 1 tablet by mouth 2 (two) times a day.   sodium fluoride (DENTAGEL) 1.1 % Gel dental gel Apply 1 application to teeth every morning.        zolpidem (AMBIEN) 10 mg tablet TAKE 1 TABLET BY MOUTH AT BEDTIME. *6 TOTAL FILLS*         Medication Plan of Care at last office visit with MD/CNP:    PLAN:  Plan:  Medication Adjustment:  I am going to increase the patient's Abilify to 2 mg twice a day.  Risks and benefits were discussed.     Other:   Patient will return to clinic in 3 months for med check.  If patient is tolerating the medication but has had no significant improvement in behaviors we will consider an increase in the medication prior to the next appointment.  The staff stated they would call us should they desire that.  The staff agree to call or return sooner if questions concerns or problems arise.       Continue with the support of the clinic, reassurance, and redirection. Staff monitoring and ongoing assessments per team plan. Current psychotropic medication appears to represent the minimum  effective dosage and appears medically necessary. We will continue to monitor and reassess. This team will utilize appropriate emergency services if necessary. I will make myself available if concerns or problems arise.     Casa Bustos MD    [place], [place], [place], and [place] : No access granted

## 2021-06-02 VITALS — BODY MASS INDEX: 25.99 KG/M2 | HEIGHT: 65 IN | WEIGHT: 156 LBS

## 2021-06-02 VITALS — WEIGHT: 172 LBS | BODY MASS INDEX: 28.66 KG/M2 | HEIGHT: 65 IN

## 2021-06-02 NOTE — PROGRESS NOTES
Correct pharmacy verified with patient and confirmed in snapshot? [x] yes []no    Charge captured ? [x] yes  [] no    Medications Phoned  to Pharmacy [] yes [x]no  Name of Pharmacist:  List Medications, including dose, quantity and instructions      Medication Prescriptions given to patient   [] yes  [x] no   List the name of the drug the prescription was written for.       Medications ordered this visit were e-scribed.  Verified by order class [x] yes  [] no  Abilify 5 mg   Medication changes or discontinuations were communicated to patient's pharmacy: [] yes  [x] no    UA collected [] yes  [x] no    [place] Prescription Monitoring Program Reviewed? [] yes  [x] no    Referrals were made to: None     Future appointment was made: [x] yes  [] no   4/13/2020  Dictation completed at time of chart check: [x] yes  [] no    I have checked the documentation for today s encounters and the above information has been reviewed and completed.

## 2021-06-02 NOTE — PROGRESS NOTES
Pt is here for psychiatric follow up and medication management.  Pt arrived late today due to road construction.  Unable to get vitals.     Staff is present today.     [place] :   Unable to verify activity at time of visit.

## 2021-06-02 NOTE — PROGRESS NOTES
Outpatient Followup Psychiatric Evaluation      Pertinent History: She has a long-standing history of developmental delay as well as atypical psychosis and a history of very significant behavioral dyscontrol and mood instability.  Over the winter 2018 we did increase the Remeron to 30 mg.      I saw the patient in September 2018.  At that time we increased the patient's Risperdal as well as the patient's melatonin due to some insomnia and behavioral difficulties.    I saw the patient in January 2018.  At that time we did taper off the patient's Risperdal over the next couple of weeks due to some ongoing behavioral difficulties despite that trial.  She had had a seizure in September and frequent urinary tract infections since then which may have contributed.  She was staying in bed much of the day despite sleeping well at night and was combative with redirection at that time.    I saw the patient in May 2019.  At that time we continued with the Resporal.  We were considering a change to Abilify.  Over the phone in early June given the patient's ongoing difficulties we did discontinue the Risperdal and started the patient on low-dose Abilify.      I saw the patient in July 2019.  There is been some improvement in her appetite but continued to be restless and periodically agitated and possibly hypomanic.  She had tolerated the recently started Abilify.  We did increase that to 2 mg twice a day.  Since that visit we did change the Abilify to 5 mg at night only.       Current Symptoms:   Patient is unable to provide any information.  She does not make eye contact.  She occasionally groans.  She has 2 staff members present who state the patient's auditory hallucinations have greatly improved.  They reports she is still restless and agitated at times but much less intense and much less frequent.  There is been ongoing variability in sleep and we again did discuss sleep hygiene.  There is been no change in cognition.  Her  appetite has improved.  She is no longer losing weight and may be gaining a bit.  They are pleased with the recent improvements.  There is still is room for improvement but they would like to not make any psychotropic medication changes at this time.    There is been no new medical diagnoses or treatments.  They did receive a warning from the pharmacy that the patient is on Celexa and they are suggesting getting an EKG.  The patient is seeing her primary care doctor tomorrow and will consider an EKG at that time.  Otherwise there is no other concerns.  No new allergies.    The staff did come with multiple forms to fill out which I did review and fill out.  Also we reviewed and signed off on target symptoms they are monitoring.          Current Medications: Please see chart. Medications personally reviewed.    Medication Compliance: YES    Side Effects to Medications:  NO      Vitals:  Wt Readings from Last 3 Encounters:   10/14/19 134 lb (60.8 kg)   07/15/19 130 lb (59 kg)   04/08/19 156 lb (70.8 kg)     Temp Readings from Last 3 Encounters:   01/24/19 97.8  F (36.6  C) (Oral)   01/22/19 98.8  F (37.1  C) (Axillary)   01/06/15 97.6  F (36.4  C) (Axillary)     BP Readings from Last 3 Encounters:   07/15/19 (!) 128/96   01/24/19 136/66   01/22/19 115/86     Pulse Readings from Last 3 Encounters:   07/15/19 (!) 57   01/24/19 85   01/22/19 84         Mental Status Exam:   Appearance: The patient is inattentive and not able to participate.  He sitting in a chair.  Rocking back and forth at times.  Looking at the floor.  The patient appears relatively comfortable. No shortness of breath. No restlessness. The patient does not respond to voice. No obvious pain. No obvious shortness of breath.  Behavior:  The patient does not appear to be uncomfortable. Not able to participate. No restlessness or agitation. No reports of any recent significant change in behavior.  Speech:  Not able to participate at this time.  Occasional  grunting.  The patient does not respond to voice. The patient makes no attempts to communicate.  Mood/Affect:  Flat and slow.  The patient does not appear to be agitated. No obvious anxiety. No lability at this time.  Thought Content:  No evidence of psychosis. No reports of any recent psychosis.  Suicidal or Homicidal Thoughts:  The patient is not able to participate. No apparent or reported evidence of any suicidal or homicidal ideation.  Thought Process/Formulation:  Inattentive. Not able to participate. No evidence of any racing thoughts.  Associations:  Inattentive. Not able to participate.  Fund of Knowledge:  Not able to participate. No reports of any recent significant change.  Attention/Concentration:  Inattentive. No participation.  Insight:  Not able to participate.  Reportedly continues impaired with no reports of any recent change.   Judgement:  Not able to participate at this time.  Reportedly continues impaired.   Memory:  No participation.   Motor Status:  Currently not able to participate. No tremor.   Orientation: Not able to participate.  Continues impaired by report.  Not able to demonstrate orientation.     Diagnosis managed and treated at today's visit :    Major neurocognitive disorder disorder, NOS with resultant mood disturbance and behavioral dyscontrol.  Atypical psychosis    Developmental delay, by history     Plan:  Medication Adjustment:  We will continue with the current medication but will consider an increase in Abilify in the future if the patient is struggling.    Other:   Patient will return to clinic in 3 months for med check.  If patient is tolerating the medication but has had no significant improvement in behaviors we will consider an increase in the medication prior to the next appointment.  The staff stated they would call us should they desire that.  The staff agree to call or return sooner if questions concerns or problems arise.      Continue with the support of the  clinic, reassurance, and redirection. Staff monitoring and ongoing assessments per team plan. Current psychotropic medication appears to represent the minimum effective dosage and appears medically necessary. We will continue to monitor and reassess. This team will utilize appropriate emergency services if necessary. I will make myself available if concerns or problems arise.    Casa Bustos MD

## 2021-06-03 VITALS — HEIGHT: 65 IN | BODY MASS INDEX: 21.66 KG/M2 | WEIGHT: 130 LBS

## 2021-06-03 VITALS — WEIGHT: 134 LBS | HEIGHT: 65 IN | BODY MASS INDEX: 22.33 KG/M2

## 2021-06-04 NOTE — TELEPHONE ENCOUNTER
The computer will not allow me to order 5 refills on Ambien.  I am not able to change the order.  Could you do that for me?

## 2021-06-04 NOTE — TELEPHONE ENCOUNTER
Date of Last Office Visit: 10-14-19  Date of Next Office Visit: 4-13-20  No shows since last visit: 0  Cancellations since last visit: 0  ED visits since last visit:  0  Medication melatonin date last ordered: 7-15-19  Qty: 30  Refills: 6  Lapse in therapy greater than 7 days: no  Medication refill request verified as identical to current order: yes  Result of Last DAM, VPA, Li+ Level, CBC, or Carbamazepine Level (at or since last visit): N/A     [] Medication refilled per [facility] M-1.   [x] Medication unable to be refilled by RN due to criteria not met as indicated below:     []Eligibility - not seen in last year    []Supervision - no future appointment    []Compliance     []Verification - order discrepancy    []Controlled Medication    [x]Medication not included in RN Protocol    []90 - day supply request    []Other   Current Medication list:    albuterol (ACCUNEB) 1.25 mg/3 mL nebulizer solution Take 3 mL (1.25 mg total) by nebulization 3 (three) times a day.   ARIPiprazole (ABILIFY) 5 MG tablet Take 1 tablet (5 mg total) by mouth daily.   calcium carbonate-vit D3-min (CALCIUM-VITAMIN D) 600 mg calcium- 400 unit Tab Take 1 tablet by mouth 2 (two) times a day.    carBAMazepine (TEGRETOL) 200 mg tablet Take 200 mg by mouth 2 (two) times a day. Take 1 tab at AM and 1500, and 1.5 tabs at bedtime        CHLORHEXIDINE GLUCONATE MM 1 application by Mucous Membrane route every morning.   ciprofloxacin-dexamethasone (CIPRODEX) otic suspension Administer 4 drops into both ears 2 (two) times a day as needed.        citalopram (CELEXA) 40 MG tablet TAKE ONE AND ONE-HALF TABLETS (60MG) BY MOUTH ONCE DAILY.   clonazePAM (KLONOPIN) 0.5 MG tablet TAKE 1 TABLET BY MOUTH AT BEDTIME. *1 TOTAL FILL*   clotrimazole-betamethasone (LOTRISONE) cream Apply 1 application topically as needed.   cycloSPORINE (RESTASIS) 0.05 % ophthalmic emulsion Administer 1 drop to both eyes 2 (two) times a day.        diphenhydrAMINE (BENADRYL) 25 mg capsule  Take 25 mg by mouth every 6 (six) hours as needed for itching.        divalproex (DEPAKOTE) 500 MG EC tablet Take 1,000 mg by mouth 3 (three) times a day. AM, 1500, HS        docusate sodium (COLACE) 100 MG capsule Take 200 mg by mouth 2 (two) times a day.   esomeprazole (NEXIUM) 20 MG capsule Take 40 mg by mouth daily before breakfast.        hydrocortisone 1 % cream Apply 1 application topically 2 (two) times a day as needed.   levOCARNitine (CARNITOR) 330 mg tablet Take 330 mg by mouth 3 (three) times a day. AM, 1500, HS        LORazepam (ATIVAN) 2 MG tablet Take 2 mg by mouth as needed for anxiety (1 hour prior to procedures that would be upsetting.).   melatonin 10 mg Tab Take 10 mg by mouth at bedtime.   mirtazapine (REMERON) 30 MG tablet Take 1 tablet (30 mg total) by mouth at bedtime.   MULTIVIT WITH CALCIUM,IRON,MIN (TAB A TENZIN ORAL) Take 1 tablet by mouth daily.   senna (SENOKOT) 8.6 mg tablet Take 1 tablet by mouth 2 (two) times a day.   sodium fluoride (DENTAGEL) 1.1 % Gel dental gel Apply 1 application to teeth every morning.        zolpidem (AMBIEN) 10 mg tablet TAKE 1 TABLET BY MOUTH AT BEDTIME *6 TOTAL FILLS*   Allergies     Drixoral   Sudafed [pseudoephedrine Hcl]   Preferred Pharmacy     [facility] [address]   [address] [address] [facility] [address]   Phone: [phone] Fax: [phone]   Not a 24 hour pharmacy; exact hours not known.       Medication Plan of Care at last office visit with MD/CNP: Medication Adjustment:  We will continue with the current medication but will consider an increase in Abilify in the future if the patient is struggling.     Other:   Patient will return to clinic in 3 months for med check.  If patient is tolerating the medication but has had no significant improvement in behaviors we will consider an increase in the medication prior to the next appointment.  The staff stated they would call us should they desire that.  The  staff agree to call or return sooner if questions concerns or problems arise.       Continue with the support of the clinic, reassurance, and redirection. Staff monitoring and ongoing assessments per team plan. Current psychotropic medication appears to represent the minimum effective dosage and appears medically necessary. We will continue to monitor and reassess. This team will utilize appropriate emergency services if necessary. I will make myself available if concerns or problems arise.     Casa Bustos MD

## 2021-06-05 NOTE — TELEPHONE ENCOUNTER
Date of Last Office Visit: 10/14/2019  Date of Next Office Visit: 04/13/2019, and this appointment was made at the 10/14/2019 appointment.  No shows since last visit: none  Cancellations since last visit: none  ED visits since last visit:  none  Medication Citalopram 40 mg date last ordered: 07/10/2019  Qty: 45  Refills: 5, last fill dispensed on 12/25/2019, for #45.    Lapse in therapy greater than 7 days: no, this request is to make sure there are refills on file.  Medication refill request verified as identical to current order: yes  Result of Last DAM, VPA, Li+ Level, CBC, or Carbamazepine Level (at or since last visit): N/A     [x] Medication refilled per [facility] M-1.   [] Medication unable to be refilled by RN due to criteria not met as indicated below:     []Eligibility - not seen in last year    []Supervision - no future appointment    []Compliance     []Verification - order discrepancy    []Controlled Medication    []Medication not included in RN Protocol    []90 - day supply request    []Other     Current Medication list:    Michell Mccann    (MRN [id number])   Your Current Medications Are     albuterol (ACCUNEB) 1.25 mg/3 mL nebulizer solution Take 3 mL (1.25 mg total) by nebulization 3 (three) times a day.   ARIPiprazole (ABILIFY) 5 MG tablet Take 1 tablet (5 mg total) by mouth daily.   calcium carbonate-vit D3-min (CALCIUM-VITAMIN D) 600 mg calcium- 400 unit Tab Take 1 tablet by mouth 2 (two) times a day.    carBAMazepine (TEGRETOL) 200 mg tablet Take 200 mg by mouth 2 (two) times a day. Take 1 tab at AM and 1500, and 1.5 tabs at bedtime        CHLORHEXIDINE GLUCONATE MM 1 application by Mucous Membrane route every morning.   ciprofloxacin-dexamethasone (CIPRODEX) otic suspension Administer 4 drops into both ears 2 (two) times a day as needed.        citalopram (CELEXA) 40 MG tablet TAKE ONE AND ONE-HALF TABLETS (60MG) BY MOUTH ONCE DAILY.   clonazePAM (KLONOPIN) 0.5 MG tablet TAKE 1 TABLET BY MOUTH AT  BEDTIME  *4 TOTAL FILLS*   clotrimazole-betamethasone (LOTRISONE) cream Apply 1 application topically as needed.   cycloSPORINE (RESTASIS) 0.05 % ophthalmic emulsion Administer 1 drop to both eyes 2 (two) times a day.        diphenhydrAMINE (BENADRYL) 25 mg capsule Take 25 mg by mouth every 6 (six) hours as needed for itching.        divalproex (DEPAKOTE) 500 MG EC tablet Take 1,000 mg by mouth 3 (three) times a day. AM, 1500, HS        docusate sodium (COLACE) 100 MG capsule Take 200 mg by mouth 2 (two) times a day.   esomeprazole (NEXIUM) 20 MG capsule Take 40 mg by mouth daily before breakfast.        hydrocortisone 1 % cream Apply 1 application topically 2 (two) times a day as needed.   levOCARNitine (CARNITOR) 330 mg tablet Take 330 mg by mouth 3 (three) times a day. AM, 1500, HS        LORazepam (ATIVAN) 2 MG tablet Take 2 mg by mouth as needed for anxiety (1 hour prior to procedures that would be upsetting.).   melatonin 5 mg Tab tablet TAKE 2 TABLETS (10MG) BY MOUTH AT BEDTIME **NON-COVERED MED** *ORDER WHEN LOW*   mirtazapine (REMERON) 30 MG tablet Take 1 tablet (30 mg total) by mouth at bedtime.   MULTIVIT WITH CALCIUM,IRON,MIN (TAB A TENZIN ORAL) Take 1 tablet by mouth daily.   senna (SENOKOT) 8.6 mg tablet Take 1 tablet by mouth 2 (two) times a day.   sodium fluoride (DENTAGEL) 1.1 % Gel dental gel Apply 1 application to teeth every morning.        zolpidem (AMBIEN) 10 mg tablet TAKE 1 TABLET BY MOUTH AT BEDTIME *6 TOTAL FILLS*         Medication Plan of Care at last office visit with MD/CNP:    Other:   Patient will return to clinic in 3 months for med check.  If patient is tolerating the medication but has had no significant improvement in behaviors we will consider an increase in the medication prior to the next appointment.  The staff stated they would call us should they desire that.  The staff agree to call or return sooner if questions concerns or problems arise.       Continue with the support of  the clinic, reassurance, and redirection. Staff monitoring and ongoing assessments per team plan. Current psychotropic medication appears to represent the minimum effective dosage and appears medically necessary. We will continue to monitor and reassess. This team will utilize appropriate emergency services if necessary. I will make myself available if concerns or problems arise.

## 2021-06-05 NOTE — TELEPHONE ENCOUNTER
Date of Last Office Visit: 10/14/19  Date of Next Office Visit: 4/13/2020  No shows since last visit: no  Cancellations since last visit: no  ED visits since last visit: no    Medication Remeron 30 mg date last ordered: 1/7/2019  Qty: 31  Refills: 11    mirtazapine (REMERON) 30 MG tablet 31 tablet 11 1/7/2019  No   Sig - Route: Take 1 tablet (30 mg total) by mouth at bedtime. - Oral       Lapse in therapy greater than 7 days: appears yes  Medication refill request verified as identical to current order: yes  Result of Last DAM, VPA, Li+ Level, CBC, or Carbamazepine Level (at or since last visit): N/A        []Eligibility - not seen in last year    []Supervision - no future appointment    [x]Compliance : Unclear med compliance    []Verification - order discrepancy    []Controlled Medication    []90 - day supply request    [x]Other LPN pending medications    Current Medication list:    albuterol (ACCUNEB) 1.25 mg/3 mL nebulizer solution Take 3 mL (1.25 mg total) by nebulization 3 (three) times a day.   ARIPiprazole (ABILIFY) 5 MG tablet Take 1 tablet (5 mg total) by mouth daily.   calcium carbonate-vit D3-min (CALCIUM-VITAMIN D) 600 mg calcium- 400 unit Tab Take 1 tablet by mouth 2 (two) times a day.    carBAMazepine (TEGRETOL) 200 mg tablet Take 200 mg by mouth 2 (two) times a day. Take 1 tab at AM and 1500, and 1.5 tabs at bedtime        CHLORHEXIDINE GLUCONATE MM 1 application by Mucous Membrane route every morning.   ciprofloxacin-dexamethasone (CIPRODEX) otic suspension Administer 4 drops into both ears 2 (two) times a day as needed.        citalopram (CELEXA) 40 MG tablet TAKE ONE AND ONE-HALF TABLETS (60MG) BY MOUTH ONCE DAILY   clonazePAM (KLONOPIN) 0.5 MG tablet TAKE 1 TABLET BY MOUTH AT BEDTIME  *4 TOTAL FILLS*   clotrimazole-betamethasone (LOTRISONE) cream Apply 1 application topically as needed.   cycloSPORINE (RESTASIS) 0.05 % ophthalmic emulsion Administer 1 drop to both eyes 2 (two) times a day.         diphenhydrAMINE (BENADRYL) 25 mg capsule Take 25 mg by mouth every 6 (six) hours as needed for itching.        divalproex (DEPAKOTE) 500 MG EC tablet Take 1,000 mg by mouth 3 (three) times a day. AM, 1500, HS        docusate sodium (COLACE) 100 MG capsule Take 200 mg by mouth 2 (two) times a day.   esomeprazole (NEXIUM) 20 MG capsule Take 40 mg by mouth daily before breakfast.        hydrocortisone 1 % cream Apply 1 application topically 2 (two) times a day as needed.   levOCARNitine (CARNITOR) 330 mg tablet Take 330 mg by mouth 3 (three) times a day. AM, 1500, HS        LORazepam (ATIVAN) 2 MG tablet Take 2 mg by mouth as needed for anxiety (1 hour prior to procedures that would be upsetting.).   melatonin 5 mg Tab tablet TAKE 2 TABLETS (10MG) BY MOUTH AT BEDTIME **NON-COVERED MED** *ORDER WHEN LOW*   mirtazapine (REMERON) 30 MG tablet Take 1 tablet (30 mg total) by mouth at bedtime.   MULTIVIT WITH CALCIUM,IRON,MIN (TAB A TENZIN ORAL) Take 1 tablet by mouth daily.   senna (SENOKOT) 8.6 mg tablet Take 1 tablet by mouth 2 (two) times a day.   sodium fluoride (DENTAGEL) 1.1 % Gel dental gel Apply 1 application to teeth every morning.        zolpidem (AMBIEN) 10 mg tablet TAKE 1 TABLET BY MOUTH AT BEDTIME *6 TOTAL FILLS*         Medication Plan of Care at last office visit with MD/CNP:    PLAN:  Plan:  Medication Adjustment:  We will continue with the current medication but will consider an increase in Abilify in the future if the patient is struggling.     Other:   Patient will return to clinic in 3 months for med check.  If patient is tolerating the medication but has had no significant improvement in behaviors we will consider an increase in the medication prior to the next appointment.  The staff stated they would call us should they desire that.  The staff agree to call or return sooner if questions concerns or problems arise.     [place], [place], [place], and [place] : NA

## 2021-06-07 NOTE — TELEPHONE ENCOUNTER
Writer talked to [facility] staff at [facility].      Notified them at this time Pt's appointment for 4/13/2020 is canceled due to Dr. Bustos being out on a unexpected JONATHAN and no return date.     Instructed staff to have Pt return to their PCP, Pt does not have one currently. Staff will update guardian.      Would like to be contacted when provider returns.

## 2021-06-08 NOTE — PROGRESS NOTES
Correct pharmacy verified with patient and confirmed in snapshot? [x] yes []no    Medications Phoned  to Pharmacy [] yes [x]no  Name of Pharmacist:  List Medications, including dose, quantity and instructions      Medication Prescriptions given to patient   [] yes  [x] no   List the name of the drug the prescription was written for.       Medications ordered this visit were e-scribed.  Verified by order class [] yes  [x] no    Medication changes or discontinuations were communicated to patient's pharmacy: [] yes  [x] no    UA collected [] yes    [x] no    [place] Prescription Monitoring Program Reviewed? [] yes  [x] no    Referrals were made to:  none  Future appointment was made: [x] yes  [] no    Dictation completed at time of chart check: [x] yes  [] no    I have checked the documentation for today s encounters and the above information has been reviewed and completed.

## 2021-06-08 NOTE — PROGRESS NOTES
Outpatient Followup Psychiatric Evaluation      Pertinent History: She has a long-standing history of developmental delay as well as atypical psychosis and a history of very significant behavioral dyscontrol and mood instability.  Presents today with 2 staff members for medication management.  No medication changes in the last couple of visits but prior to that we did add some low-dose Remeron which seems to been helpful.        Current Symptoms: Patient is unable to provide any history with staff reports she is doing quite well.  No change in cognition.  No change in behavior.  No change in activity level.  She's been sleeping well and eating well.  No obvious psychosis.  She occasionally has restlessness but most part is redirectable.  They've noted no side effects to the medication.  There is no evidence of any tardive dyskinesia on exam.  Risks and benefits were discussed.  I did spend some time filling out a variety of reports and paperwork for the group home.    Current Medications: Please see chart. Medications personally reviewed.    Medication Compliance: YES    Side Effects to Medications:  NO      Vitals:  Wt Readings from Last 3 Encounters:   05/18/15 160 lb (72.6 kg)   05/06/15 158 lb (71.7 kg)   02/02/15 164 lb (74.4 kg)     Temp Readings from Last 3 Encounters:   01/06/15 97.6  F (36.4  C) (Axillary)     BP Readings from Last 3 Encounters:   11/23/15 131/78   05/18/15 134/79   05/06/15 110/62     Pulse Readings from Last 3 Encounters:   11/23/15 70   05/18/15 64   05/06/15 82         Mental Status Exam:   Patient was disinterested.  She occasionally made vocal noises but nothing discernible.  She was unable to answer any questions.  She appeared disinterested with limited eye contact.  A bit less restless and she was directable.  Mood was not obviously depressed or anxious but affect was flat.  Attention and concentration are baseline impaired.  Thought content does not show any obvious psychosis.  No  suicidal or homicidal ideation.  Insight, judgment and memory are all baseline impaired and unchanged.  Fund of knowledge is baseline impaired.    Diagnosis managed and treated at today's visit :    Axis I: Cognitive disorder, NOS   Atypical psychosis     mood disorder, NOS       Axis II: Developmental delay, by history     Axis III: Please see intake note.  Seizure disorder by history      Plan:  Medication Adjustment:  Continue with current medication regime.    Other:   Patient will return to clinic in 3 months for med check.  The staff agree to call or return sooner if questions concerns or problems arise.      Continue with the support of the clinic, reassurance, and redirection. Staff monitoring and ongoing assessments per team plan. Current psychotropic medication appears to represent the minimum effective dosage and appears medically necessary. We will continue to monitor and reassess. This team will utilize appropriate emergency services if necessary. I will make myself available if concerns or problems arise.    Casa Bustos

## 2021-06-08 NOTE — PROGRESS NOTES
Pt here for follow up with 2 staff members from [facility].  Unable to obtain vitals due to pt's condition today.    Per staff no behaviors since last visit.

## 2021-06-09 NOTE — PROGRESS NOTES
Outpatient Followup Psychiatric Evaluation      Pertinent History: She has a long-standing history of developmental delay as well as atypical psychosis and a history of very significant behavioral dyscontrol and mood instability.  Over the winter 2018 we did increase the Remeron to 30 mg.      I saw the patient in September 2018.  At that time we increased the patient's Risperdal as well as the patient's melatonin due to some insomnia and behavioral difficulties.    I saw the patient in January 2018.  At that time we did taper off the patient's Risperdal over the next couple of weeks due to some ongoing behavioral difficulties despite that trial.  She had had a seizure in September and frequent urinary tract infections since then which may have contributed.  She was staying in bed much of the day despite sleeping well at night and was combative with redirection at that time.    I saw the patient in May 2019.  At that time we continued with the Resporal.  We were considering a change to Abilify.  Over the phone in early June given the patient's ongoing difficulties we did discontinue the Risperdal and started the patient on low-dose Abilify.      I saw the patient in July 2019.  There is been some improvement in her appetite but continued to be restless and periodically agitated and possibly hypomanic.  She had tolerated the recently started Abilify.  We did increase that to 2 mg twice a day.  Since that visit we did change the Abilify to 5 mg at night only.     I saw the patient in the winter of 2020. We did not make any medication changes at that time but did consider an increase in her psychotropic medication. Tiffany reportedly improved with her auditory hallucinations but still had some periods of restlessness and agitation although they were less intense. The primary care doctor was considering in EKG at the last visit due to the patient's medication list.       Current Symptoms:    I did have an opportunity to  speak with the house staff member. The patient was unable to participate in the interview. The staff report no significant change in the patient's presentation. She seems relatively comfortable but does have periods where she will yell out. She's often triggered by hunger. She seems to have more trouble behaviorally if she doesn't sleep well. She still cycles in her sleep with no definitive pattern. They report there is been no obvious change in her cognition. There is been no obvious psychosis. No self-injurious behaviors. Mood seems baseline. There's been no new medical issues or concerns and no obvious side effects to the medication. They are requesting no medication changes at this time. We did review past chart entries recognizing there's been no recent medication changes neglect to continue with this plan.          Current Medications: Please see chart. Medications personally reviewed.    Medication Compliance: YES    Side Effects to Medications:  NO      Vitals:  Wt Readings from Last 3 Encounters:   10/14/19 134 lb (60.8 kg)   07/15/19 130 lb (59 kg)   04/08/19 156 lb (70.8 kg)     Temp Readings from Last 3 Encounters:   01/24/19 97.8  F (36.6  C) (Oral)   01/22/19 98.8  F (37.1  C) (Axillary)   01/06/15 97.6  F (36.4  C) (Axillary)     BP Readings from Last 3 Encounters:   07/15/19 (!) 128/96   01/24/19 136/66   01/22/19 115/86     Pulse Readings from Last 3 Encounters:   07/15/19 (!) 57   01/24/19 85   01/22/19 84         Mental Status Exam:   Appearance:  The patient was interviewed via the phone but she was unable to participate. Information was gathered from staff was present.  Behavior:  The staff report patient continues with periods of being disruptive and yelling out but overall no significant change in her presentation. No obvious pain. She has had some drainage from her ear which occasionally causes her some pain apparently.  Speech:   The patient again was unable to participate in any meaningful  way. I did hear her occasionally yelling out to get staff's attention as she was hungry. She was unable to formulate any sentences however.  Mood/Affect:   Staff report no significant change. She does have occasional irritability but for the most part is flat and slow.  Thought Content:  No evidence of psychosis. No reports of any recent psychosis.  Suicidal or Homicidal Thoughts:  The patient is not able to participate. No apparent or reported evidence of any suicidal or homicidal ideation.  Thought Process/Formulation:  Not able to participate.  No evidence of any racing thoughts.  Associations:  Inattentive. Not able to participate.  Fund of Knowledge:  Not able to participate. No reports of any recent significant change.  Attention/Concentration:  Inattentive. No participation. No reports of any recent change.  Insight:  Severely impaired.   Judgement:  Severely impaired.   Memory:  Severely impaired. No participation.   Motor Status:  Currently not able to participate. No tremor Reported by staff.   Orientation: Not able to participate.  Continues impaired by report.  Not able to demonstrate orientation.     Diagnosis managed and treated at today's visit :    Major neurocognitive disorder disorder, NOS with resultant mood disturbance and behavioral dyscontrol.  Atypical psychosis    Developmental delay, by history     Plan:  Medication Adjustment:   we will make no psychotropic medication changes at this time.     I saw the patient for 17 minutes.    Other:   Patient will return to clinic in 3 months for A virtual med check.  Patient is apparently tolerating the current treatment plan. The staff agree to call or return sooner if questions concerns or problems arise.      Continue with the support of the clinic, reassurance, and redirection. Staff monitoring and ongoing assessments per team plan. Current psychotropic medication appears to represent the minimum effective dosage and appears medically necessary. We  will continue to monitor and reassess. This team will utilize appropriate emergency services if necessary. I will make myself available if concerns or problems arise.    Casa Bustos MD

## 2021-06-09 NOTE — PATIENT INSTRUCTIONS - HE
Today we will continue with the current treatment plan. The patient seems to be tolerating the current medication regime and treatment plan. The staff offer no other questions or concerns. We will see the patient back in three months for another virtual medicine visit. The staff agree to call us with any questions or concerns prior to that.

## 2021-06-09 NOTE — TELEPHONE ENCOUNTER
Pharmacist is calling in regards to get the quantity amount changed for zolpidem (AMBIEN) 10 mg tablet. She advised that they are looking to get them a 31 day supply due to the amount of days in July.  She advised they can take a verbal script change for the quantity.    [facility] - [place], [place] - [address] (Pharmacy) [phone]

## 2021-06-09 NOTE — TELEPHONE ENCOUNTER
Pharmacy called and given the authorization to change zolpidem quantity to 31 day supply. Spoke to pharmacist Breanna.

## 2021-06-09 NOTE — TELEPHONE ENCOUNTER
Date of Last Office Visit: 10/14/19  Date of Next Office Visit: 7/13/20  No shows since last visit: none  Cancellations since last visit: clinic initiated  ED visits since last visit: none  Medication Ambien date last ordered: 12/211/2019 Qty: 31  Refills: 5  Lapse in therapy greater than 7 days: unknown  Medication refill request verified as identical to current order: yes  Result of Last DAM, VPA, Li+ Level, CBC, or Carbamazepine Level (at or since last visit):NA     [] Medication refilled per [facility].   [x] Medication unable to be refilled by RN due to criteria not met as indicated below:     []Eligibility - not seen in last year    []Supervision - no future appointment    []Compliance     []Verification - order discrepancy    []Controlled Medication    []Medication not included in RN Protocol    []90 - day supply request    [x]Other   Current Medication list:    albuterol (ACCUNEB) 1.25 mg/3 mL nebulizer solution  Take 3 mL (1.25 mg total) by nebulization 3 (three) times a day.    ARIPiprazole (ABILIFY) 5 MG tablet  Take 1 tablet (5 mg total) by mouth daily.    calcium carbonate-vit D3-min (CALCIUM-VITAMIN D) 600 mg calcium- 400 unit Tab  Take 1 tablet by mouth 2 (two) times a day.    carBAMazepine (TEGRETOL) 200 mg tablet  Take 200 mg by mouth 2 (two) times a day. Take 1 tab at AM and 1500, and 1.5 tabs at bedtime        CHLORHEXIDINE GLUCONATE MM  1 application by Mucous Membrane route every morning.    ciprofloxacin-dexamethasone (CIPRODEX) otic suspension  Administer 4 drops into both ears 2 (two) times a day as needed.        citalopram (CELEXA) 40 MG tablet  TAKE ONE AND ONE-HALF TABLETS (60MG) BY MOUTH ONCE DAILY    clonazePAM (KLONOPIN) 0.5 MG tablet  TAKE 1 TABLET BY MOUTH AT BEDTIME  *4 TOTAL FILLS*    clotrimazole-betamethasone (LOTRISONE) cream  Apply 1 application topically as needed.    cycloSPORINE (RESTASIS) 0.05 % ophthalmic emulsion  Administer 1 drop to both eyes 2 (two) times a day.         diphenhydrAMINE (BENADRYL) 25 mg capsule  Take 25 mg by mouth every 6 (six) hours as needed for itching.        divalproex (DEPAKOTE) 500 MG EC tablet  Take 1,000 mg by mouth 3 (three) times a day. AM, 1500, HS        docusate sodium (COLACE) 100 MG capsule  Take 200 mg by mouth 2 (two) times a day.    esomeprazole (NEXIUM) 20 MG capsule  Take 40 mg by mouth daily before breakfast.        hydrocortisone 1 % cream  Apply 1 application topically 2 (two) times a day as needed.    levOCARNitine (CARNITOR) 330 mg tablet  Take 330 mg by mouth 3 (three) times a day. AM, 1500, HS        LORazepam (ATIVAN) 2 MG tablet  Take 2 mg by mouth as needed for anxiety (1 hour prior to procedures that would be upsetting.).    melatonin 5 mg Tab tablet  TAKE 2 TABLETS (10MG) BY MOUTH AT BEDTIME **NON-COVERED MED** *ORDER WHEN LOW*    mirtazapine (REMERON) 30 MG tablet  TAKE 1 TABLET BY MOUTH AT BEDTIME.    MULTIVIT WITH CALCIUM,IRON,MIN (TAB A TENZIN ORAL)  Take 1 tablet by mouth daily.    senna (SENOKOT) 8.6 mg tablet  Take 1 tablet by mouth 2 (two) times a day.    sodium fluoride (DENTAGEL) 1.1 % Gel dental gel  Apply 1 application to teeth every morning.        zolpidem (AMBIEN) 10 mg tablet  TAKE 1 TABLET BY MOUTH AT BEDTIME *6 TOTAL FILLS*        Medication Plan of Care at last office visit with MD/CNP:  Plan:  Medication Adjustment:  We will continue with the current medication but will consider an increase in Abilify in the future if the patient is struggling.     Other:   Patient will return to clinic in 3 months for med check.  If patient is tolerating the medication but has had no significant improvement in behaviors we will consider an increase in the medication prior to the next appointment.  The staff stated they would call us should they desire that.  The staff agree to call or return sooner if questions concerns or problems arise.       Continue with the support of the clinic, reassurance, and redirection. Staff  monitoring and ongoing assessments per team plan. Current psychotropic medication appears to represent the minimum effective dosage and appears medically necessary. We will continue to monitor and reassess. This team will utilize appropriate emergency services if necessary. I will make myself available if concerns or problems arise.    [place] :  reviewed, unable to embed report due to remote status, last fills 6/5, 5/7, 4/7

## 2021-06-09 NOTE — PROGRESS NOTES
This video/telephone visit will be conducted via a call between you and your physician/provider. We have found that certain health care needs can be provided without the need for an in-person physical exam. This service lets us provide the care you need with a video /telephone conversation. If a prescription is necessary we can send it directly to your pharmacy. If lab work is needed we can place an order for that and you can then stop by our lab to have the test done at a later time.    Just as we bill insurance for in-person visits, we also bill insurance for video/telephone visits. If you have questions about your insurance coverage, we recommend that you speak with your insurance company.    Patient has given verbal consent for video/Telephone visit? YES  Patient would like the video visit invitation sent to email: [email or web address].  Lili PRADO CMA   AVS: Mail to home address  Per Staff: Behaviors are at her baseline. No changes there.   Patient's satff verified allergies, medications and pharmacy via phone.  Patient's staff states she is ready for visit.    [place]  was reviewed prior to seeing patient today.

## 2021-06-09 NOTE — PROGRESS NOTES
________________________________________  Medications Phoned  to Pharmacy [] yes [x]no  Name of Pharmacist:  List Medications, including dose, quantity and instructions    Medications ordered this visit were e-scribed.  Verified by order class [x] yes  [] no  Abilify 5 mg; Ambien 10 mg; Celexa 40 mg; Klonopin 0.5 mg; melatonin 5 mg; Remeron 30 mg    Medication changes or discontinuations were communicated to patient's pharmacy: [x] yes  [] no  Confirmed w/[facility] they did not get Rx on 7/10/2020 for Ambien 10 mg  Received on 7/13/2020  Dictation completed at time of chart check: [x] yes  [] no    I have checked the documentation for today s encounters and the above information has been reviewed and completed.

## 2021-06-11 NOTE — PATIENT INSTRUCTIONS - HE
Patient is relatively stable and tolerating the current medication regime. She will return to this clinic for a medication check in six months. The staff agreed to call before them with any questions or sermons.  The patient had been receiving maintenance labs through her primary care clinic by report.I will recheck a CBC, liver function tests, Lipids and Depakote level. I will make myself available for other questions, concerns or problems arise.

## 2021-06-11 NOTE — PROGRESS NOTES
Faxed to [facility] for Lab-blood draws and mailed AVS     _________________________________  Medications Phoned  to Pharmacy [] yes [x]no  Name of Pharmacist:  List Medications, including dose, quantity and instructions    Medications ordered this visit were e-scribed.  Verified by order class [] yes  [x] no    Medication changes or discontinuations were communicated to patient's pharmacy: [] yes  [x] no    Dictation completed at time of chart check: [x] yes  [] no    I have checked the documentation for today s encounters and the above information has been reviewed and completed.

## 2021-06-11 NOTE — TELEPHONE ENCOUNTER
Fax labs results received from [facility].  Abnormal results are below:    Hepatic Function Panel:    Globulin 3.9 High  A/G Ratio  0.9 Low  Bilirubin total   0.1  Low  Bilirubin Direct  <0.1  Low      Lipid panel +Reflex LDL     Cholesterol ,Total  237  High  Triglycerides   167  High  Non-HDL Cholesterol   170  High  LDL  Cholesterol 137 High       CBC And Differential    WBC  5.6  RBC  3.73  Low  MCV  103  High      Hard copy placed in provider's mailbox.

## 2021-06-11 NOTE — PROGRESS NOTES
Outpatient Followup Psychiatric Evaluation      Pertinent History: She has a long-standing history of developmental delay as well as atypical psychosis and a history of very significant behavioral dyscontrol and mood instability. We have not made any recent medication changes.       Current Symptoms:   Again is unable to provide any history.  She spent quite restless and at times somewhat difficult to redirect.  She is sleeping better.    The staff state the patient again a little bit of weight in part because they are not able to take around the community due to the increased and anxiety.  They have noted no psychosis.  There is no self-injurious behaviors.  She just is quite a bit more busy.  Medically she is been stable with no new issues or concerns.  We did talk about the fact that she is beginning to sleep a bit better which has been a significant problem.  We talked about possibly further increasing the Remeron to see if we can improve anxiety and further improve sleep.    Current Medications: Please see chart. Medications personally reviewed.    Medication Compliance: YES    Side Effects to Medications:  NO      Vitals:  Wt Readings from Last 3 Encounters:   06/26/17 182 lb (82.6 kg)   05/18/15 160 lb (72.6 kg)   05/06/15 158 lb (71.7 kg)     Temp Readings from Last 3 Encounters:   01/06/15 97.6  F (36.4  C) (Axillary)     BP Readings from Last 3 Encounters:   11/23/15 131/78   05/18/15 134/79   05/06/15 110/62     Pulse Readings from Last 3 Encounters:   11/23/15 70   05/18/15 64   05/06/15 82         Mental Status Exam:   Patient was restless.  She dumped magazines on the floor and constantly was trying to stand up.  She was able to be redirected with some effort.  Not obviously uncomfortable or short of breath.  She is nonverbal although occasionally grunts and makes noises.  She is not able to offer any history.  Mood appears somewhat anxious.  Affect is a bit labile.  Attention and concentration are  baseline impaired.  Thought content does not show any hallucinations or delusions.  Suicidal or homicidal ideation.  Thought formation does not show the patient to be loose.  Insight, judgment and memory are all severely impaired and unchanged.  Fund of knowledge is severely impaired.    Diagnosis managed and treated at today's visit :    Axis I: Cognitive disorder, NOS   Atypical psychosis     mood disorder, NOS       Axis II: Developmental delay, by history     Axis III: Please see intake note.  Seizure disorder by history      Plan:  Medication Adjustment:  I am going to increase the patient's Remeron to 30 mg a day.    Other:   Patient will return to clinic in 3 months for med check.  The staff agree to call or return sooner if questions concerns or problems arise.      Continue with the support of the clinic, reassurance, and redirection. Staff monitoring and ongoing assessments per team plan. Current psychotropic medication appears to represent the minimum effective dosage and appears medically necessary. We will continue to monitor and reassess. This team will utilize appropriate emergency services if necessary. I will make myself available if concerns or problems arise.    Casa Bustos

## 2021-06-11 NOTE — PROGRESS NOTES
Pt here for follow up with house staff.    Per report been getting into more things, but she is sleeping better.      Unable to obtain vitals signs due to not able to sit still.

## 2021-06-11 NOTE — PROGRESS NOTES
Outpatient Followup Psychiatric Evaluation      Pertinent History: She has a long-standing history of developmental delay as well as atypical psychosis and a history of very significant behavioral dyscontrol and mood instability.  Over the winter 2018 we did increase the Remeron to 30 mg.      I saw the patient in September 2018.  At that time we increased the patient's Risperdal as well as the patient's melatonin due to some insomnia and behavioral difficulties.    I saw the patient in January 2018.  At that time we did taper off the patient's Risperdal over the next couple of weeks due to some ongoing behavioral difficulties despite that trial.  She had had a seizure in September and frequent urinary tract infections since then which may have contributed.  She was staying in bed much of the day despite sleeping well at night and was combative with redirection at that time.    I saw the patient in May 2019.  At that time we continued with the Resporal.  We were considering a change to Abilify.  Over the phone in early June given the patient's ongoing difficulties we did discontinue the Risperdal and started the patient on low-dose Abilify.      I saw the patient in July 2019.  There is been some improvement in her appetite but continued to be restless and periodically agitated and possibly hypomanic.  She had tolerated the recently started Abilify.  We did increase that to 2 mg twice a day.  Since that visit we did change the Abilify to 5 mg at night only.     I saw the patient in the winter of 2020. We did not make any medication changes at that time but did consider an increase in her psychotropic medication. Tiffany reportedly improved with her auditory hallucinations but still had some periods of restlessness and agitation although they were less intense. The primary care doctor was considering in EKG at the last visit due to the patient's medication list.    I saw the patient for a virtual visit on July 13 of  2020. She was doing fairly well at that time and we did not make any medication changes. She continued to be followed through her medical team as well.     Current Symptoms:    I interviewed the staff member by phone today. The patient was unable to participate. They report no significant change in the patient. No change in cognition or behavior. She continues to be minimally interactive but appears relatively comfortable. There's been no new medical issues or concerns. They continue to monitor her sleep and behavior. She's typically cycles where shall have a week or so where she is more active perhaps a bit manic and then will be a bit more lethargic for a week or two. This is been going on for years. They're trying some sleep hygiene techniques such as offering her more close at night which seems to comfort her. There also giving her a bath at night. They also believe ibuprofen in the evening may help her sleep a bit better.     There's been no hallucinations or delusions. No obvious side effects to the medication. She seems to be tolerating the current treatment plan. There's been no new concerns voiced by her primary team.      Current Medications: Please see chart. Medications personally reviewed.    Medication Compliance: YES    Side Effects to Medications:  NO      Vitals:  Wt Readings from Last 3 Encounters:   10/14/19 134 lb (60.8 kg)   07/15/19 130 lb (59 kg)   04/08/19 156 lb (70.8 kg)     Temp Readings from Last 3 Encounters:   01/24/19 97.8  F (36.6  C) (Oral)   01/22/19 98.8  F (37.1  C) (Axillary)   01/06/15 97.6  F (36.4  C) (Axillary)     BP Readings from Last 3 Encounters:   07/15/19 (!) 128/96   01/24/19 136/66   01/22/19 115/86     Pulse Readings from Last 3 Encounters:   07/15/19 (!) 57   01/24/19 85   01/22/19 84         Mental Status Exam:   Appearance:  The patient was unable to participate but again I spoke to the staff via phone.  Behavior:  No significant change. Patient tends to have  periods for a week or two where she's more active and restless in them will be a bit more lethargic and tired. This is been chronic. She's been directed bone appears relatively comfortable.  Speech:   Not able to participate at this time. Continues with significant communication deficits which are unchanged.  Mood/Affect:   Staff report no significant change. She does have occasional irritability but for the most part is flat and slow. She does seem to cycle every couple of weeks she will become a bit more active and restless followed by periods of relative calm  Thought Content:  No evidence of psychosis. No reports of any recent psychosis.  Suicidal or Homicidal Thoughts:  The patient is not able to participate. No apparent or reported evidence of any suicidal or homicidal ideation.  Thought Process/Formulation:   No reports of any significant change. Still with significant communication impairment.  Associations:  Inattentive. Not able to participate.  Fund of Knowledge:  Not able to participate. No reports of any recent significant change.  Attention/Concentration:  I Not able to participate at this time. No recent change reported.  Insight:  Severely impaired.   Judgement:  Severely impaired.   Memory:  Severely impaired. No participation.   Motor Status:  Currently not able to participate. No tremor Reported by staff.   Orientation: Not able to participate.  Continues impaired by report.  Not able to demonstrate orientation.     Diagnosis managed and treated at today's visit :    Major neurocognitive disorder disorder, NOS with resultant mood disturbance and behavioral dyscontrol.  Atypical psychosis    Developmental delay, by history     Plan:  Medication Adjustment:   We will continue with the current treatment plan.     I interviewed the staff for 15 minutes.    Other:   Patient will return to clinic in 6 months for A virtual med check.  Patient is apparently tolerating the current treatment plan. The staff  agree to call or return sooner if questions concerns or problems arise.      Continue with the support of the clinic, reassurance, and redirection. Staff monitoring and ongoing assessments per team plan. Current psychotropic medication appears to represent the minimum effective dosage and appears medically necessary. We will continue to monitor and reassess. This team will utilize appropriate emergency services if necessary. I will make myself available if concerns or problems arise.    Casa Bustos MD

## 2021-06-13 NOTE — PROGRESS NOTES
Correct pharmacy verified with patient and confirmed in snapshot? [x] yes []no    Charge captured ? [x] yes  [] no    Medications Phoned  to Pharmacy [] yes [x]no  Name of Pharmacist:  List Medications, including dose, quantity and instructions      Medication Prescriptions given to patient   [] yes  [x] no   List the name of the drug the prescription was written for.       Medications ordered this visit were e-scribed.  Verified by order class [] yes  [x] no    Medication changes or discontinuations were communicated to patient's pharmacy: [] yes  [x] no    UA collected [] yes  [x] no    [place] Prescription Monitoring Program Reviewed? [] yes  [x] no    Referrals were made to: none     Future appointment was made: [x] yes  [] no    Dictation completed at time of chart check: [x] yes  [] no    I have checked the documentation for today s encounters and the above information has been reviewed and completed.

## 2021-06-13 NOTE — PROGRESS NOTES
Pt here for follow up and medication management.  Per staff no behaviors to report.  Unable to obtain vitals due to patient's comfort level.     Pt was not able to stay still to obtain weight check but is weighed at group home once a month.

## 2021-06-13 NOTE — PROGRESS NOTES
Outpatient Followup Psychiatric Evaluation      Pertinent History: She has a long-standing history of developmental delay as well as atypical psychosis and a history of very significant behavioral dyscontrol and mood instability.  At the last visit we did increase the Remeron to 30 mg.  He presents again today with staff for medication management.       Current Symptoms:   Unable to provide any history.  The staff report the patient has been doing relatively well and seemed to be tolerating the medications.    No change in cognition.  Patient appears comfortable and is able to get her point across.  Occasionally resistive but not aggressive.  No self injurious behaviors.  Sleeping well.  Eating well.  No obvious psychosis.  No obvious side effects to the medication.  We again discussed the current medication regime and the staff would like to not make any medication reductions.  He reminded me that multiple times in the past for management tried the patient has decompensated.    Current Medications: Please see chart. Medications personally reviewed.    Medication Compliance: YES    Side Effects to Medications:  NO      Vitals:  Wt Readings from Last 3 Encounters:   06/26/17 182 lb (82.6 kg)   05/18/15 160 lb (72.6 kg)   05/06/15 158 lb (71.7 kg)     Temp Readings from Last 3 Encounters:   01/06/15 97.6  F (36.4  C) (Axillary)     BP Readings from Last 3 Encounters:   11/23/15 131/78   05/18/15 134/79   05/06/15 110/62     Pulse Readings from Last 3 Encounters:   11/23/15 70   05/18/15 64   05/06/15 82         Mental Status Exam:   Patient was directable but no eye contact.  She was somewhat restless.  No obvious pain or shortness of breath.  Nonverbal other than occasional grunts.  Not obviously depressed or anxious.  No lability at this time.  Attention and concentration are baseline impaired.  Thought content does not show any obvious psychosis.  No suicidal or homicidal ideation.  Thought formation does not show  the patient to be loose.  Insight, judgment and memory are all baseline impaired and unchanged.    Diagnosis managed and treated at today's visit :    Axis I: Cognitive disorder, NOS   Atypical psychosis     mood disorder, NOS       Axis II: Developmental delay, by history     Axis III: Please see intake note.  Seizure disorder by history      Plan:  Medication Adjustment:  No changes at this time.    Other:   Patient will return to clinic in 6 months for med check.  The staff agree to call or return sooner if questions concerns or problems arise.      Continue with the support of the clinic, reassurance, and redirection. Staff monitoring and ongoing assessments per team plan. Current psychotropic medication appears to represent the minimum effective dosage and appears medically necessary. We will continue to monitor and reassess. This team will utilize appropriate emergency services if necessary. I will make myself available if concerns or problems arise.    Casa Bustos MD

## 2021-06-15 NOTE — PROGRESS NOTES
Outpatient Followup Psychiatric Evaluation      Pertinent History: She has a long-standing history of developmental delay as well as atypical psychosis and a history of very significant behavioral dyscontrol and mood instability.  Over the winter 2018 we did increase the Remeron to 30 mg.      I saw the patient in September 2018.  At that time we increased the patient's Risperdal as well as the patient's melatonin due to some insomnia and behavioral difficulties.    I saw the patient in January 2018.  At that time we did taper off the patient's Risperdal over the next couple of weeks due to some ongoing behavioral difficulties despite that trial.  She had had a seizure in September and frequent urinary tract infections since then which may have contributed.  She was staying in bed much of the day despite sleeping well at night and was combative with redirection at that time.    I saw the patient in May 2019.  At that time we continued with the Resporal.  We were considering a change to Abilify.  Over the phone in early June given the patient's ongoing difficulties we did discontinue the Risperdal and started the patient on low-dose Abilify.      I saw the patient in July 2019.  There is been some improvement in her appetite but continued to be restless and periodically agitated and possibly hypomanic.  She had tolerated the recently started Abilify.  We did increase that to 2 mg twice a day.  Since that visit we did change the Abilify to 5 mg at night only.     I saw the patient in the winter of 2020. We did not make any medication changes at that time but did consider an increase in her psychotropic medication. Tiffany reportedly improved with her auditory hallucinations but still had some periods of restlessness and agitation although they were less intense. The primary care doctor was considering in EKG at the last visit due to the patient's medication list.    I saw the patient for a virtual visit on July 13 of  2020. She was doing fairly well at that time and we did not make any medication changes. She continued to be followed through her medical team as well.    The patient had an appointment by phone in September 2020.  There was no significant change in the patient's presentation and she was tolerating the current treatment plan.  She still was having cyclical behavioral difficulties.  The team was working on sleep hygiene issues.  We did not make any medication changes at that visit.     Current Symptoms:   I interviewed staff by phone today.  They reported the patient was doing well and they had no concerns or complaints.  The patient again was unable to participate in the interview.    The staff report the patient is sleeping well and eating well.  She continues to have some cycling issues regarding her sleep.  This is been chronic and the staff are now learning how to anticipate and work with this and they believe things are going well.  The patient seems relatively comfortable with no obvious depression or anxiety.  There is been no psychosis.  No self-injurious behaviors.  No new issues or concerns.  No new tremors.  No apparent side effects to the medication.  They would like to continue with the current treatment plan and have no other questions at this time.  The guardian has been informed and is in agreement with the plan as well.      Current Medications: Please see chart. Medications personally reviewed.    Medication Compliance: YES    Side Effects to Medications:  NO      Vitals:  Wt Readings from Last 3 Encounters:   10/14/19 134 lb (60.8 kg)   07/15/19 130 lb (59 kg)   04/08/19 156 lb (70.8 kg)     Temp Readings from Last 3 Encounters:   01/24/19 97.8  F (36.6  C) (Oral)   01/22/19 98.8  F (37.1  C) (Axillary)   01/06/15 97.6  F (36.4  C) (Axillary)     BP Readings from Last 3 Encounters:   07/15/19 (!) 128/96   01/24/19 136/66   01/22/19 115/86     Pulse Readings from Last 3 Encounters:   07/15/19 (!)  57   01/24/19 85   01/22/19 84         Mental Status Exam:   Appearance: Staff were interviewed by the phone.  Behavior: No significant change.  She seems relatively comfortable.  She still has some periods of cycling but these are manageable and predictable.  Speech: Patient is unable participate in the staff report no changes.  Mood/Affect:   Staff report no significant change.  Mood seems relatively stable.  No significant depression or lability.  Thought Content:  No evidence of psychosis. No reports of any recent psychosis.  Suicidal or Homicidal Thoughts:  The patient is not able to participate. No apparent or reported evidence of any suicidal or homicidal ideation.  Thought Process/Formulation: No significant change.  Limited interaction.  Associations:  Inattentive. Not able to participate.  Fund of Knowledge:  Not able to participate. No reports of any recent significant change.  Attention/Concentration:  I Not able to participate at this time. No recent change reported.  Insight:  Severely impaired.  No recent change.  Judgement:  Severely impaired.  No recent change.  Memory:  Severely impaired. No participation.   Motor Status:  Currently not able to participate. No tremor Reported by staff.   Orientation: Not able to participate.  No reports of any significant change.  Continues impaired by report.  Not able to demonstrate orientation.     Diagnosis managed and treated at today's visit :    Major neurocognitive disorder disorder, NOS with resultant mood disturbance and behavioral dyscontrol.  Atypical psychosis    Developmental delay, by history     Plan:  Medication Adjustment:   We will continue with the current treatment plan.    Interview with the staff.  Records review and documentation was 22 minutes.    Other:   Patient will return to clinic in 6 months for A virtual med check.  The staff agreed to call before then with any questions or concerns.    Continue with the support of the clinic,  reassurance, and redirection. Staff monitoring and ongoing assessments per team plan. Current psychotropic medication appears to represent the minimum effective dosage and appears medically necessary. We will continue to monitor and reassess. This team will utilize appropriate emergency services if necessary. I will make myself available if concerns or problems arise.    Casa Bustos MD

## 2021-06-15 NOTE — PROGRESS NOTES
Operative Note    Patient: Joseph June 59 year old male    MRN: [id number]    Surgeon(s): Hugh Carter MD  Phone Number: [phone]                       Surgeon(s) and Role:     * Hugh Carter MD - Primary     * David Galindo MD - Co-Surgeon     * Carmen Lackey MD - Co-Surgeon    Pre-Op Diagnosis: Spondylolisthesis of lumbar region [M43.16]  Failed back syndrome [M96.1]     Post-Op Diagnosis:  Same     Procedure: Procedure(s):  LUMBAR 4 - 5 ANTERIOR LUMBAR INTERBODY FUSION &  LUMBAR 4 - 5  ANTERIOR INSTRUMENTATION  FUSION, SPINE, LUMBAR, ANTERIOR APPROACH    Anesthesia Type: General                                   Complications: None    Specimens Removed: No specimens collected during this procedure.     Estimated Blood Loss:  5 mL     Implants:   Implant Name Type Inv. Item Serial No.  Lot No. LRB No. Used Action   GRAFT INFS 2.8ML 23MM SM 14MM RHBMP-2 BOVINE CLGN - S. Tissue GRAFT INFS 2.8ML 23MM SM 14MM RHBMP-2 BOVINE CLGN . [employer] [id number] N/A 1 Implanted   SCREW BN 5.5MM 25MM [employer] ERIC ANG HA STRL SPINE - S. Screw SCREW BN 5.5MM 25MM [employer] ERIC ANG HA STRL SPINE . [employer] [id number] N/A 1 Implanted   PATCH CV THK.6MM 7.5X5CM GRTX STRL - [id number] Graft PATCH CV THK.6MM 7.5X5CM GRTX STRL [id number] [employer] . N/A 1 Implanted   INDEPENDENCE MIS SPACER 26 X 34MM 8 DEG 17MM Spacer, Spine  .  . N/A 1 Implanted   25MM LUMBAR ANCHOR - S. Connector 25MM LUMBAR ANCHOR . [employer] . N/A 2 Implanted     Indication for procedure:  59-year-old male history of lumbar spondylolisthesis degenerative in nature with central bilateral foraminal stenosis.  Symptomatic neurogenic claudication.  Deemed suitable for operative intervention.  Risks include but not all inclusive to blood loss, infection, damage region nerve structure, need subsequent procedure, CSF leak, paralysis, and death.  Full informed consent was obtained.    Procedure  ________________________________________  Medications Phoned  to Pharmacy [] yes [x]no  Name of Pharmacist:  List Medications, including dose, quantity and instructions    Medications ordered this visit were e-scribed.  Verified by order class [x] yes  [] no   Abilify 5 mg; Ambien 10 mg; Celexa 40 mg; Klonopin 0.5 mg; melatonin 5 mg; Remeron 30 mg  Medication changes or discontinuations were communicated to patient's pharmacy: [x] yes  [] no   Remeron 30 mg discontinue open refills.   Dictation completed at time of chart check: [x] yes  [] no    I have checked the documentation for today s encounters and the above information has been reviewed and completed.       

## 2021-06-16 NOTE — TELEPHONE ENCOUNTER
Telephone Encounter by Kay Perez LPN at 12/30/2019  3:37 PM     Author: Kay Perez LPN Service: -- Author Type: Licensed Nurse    Filed: 12/30/2019  4:12 PM Encounter Date: 12/30/2019 Status: Signed    : Kay Perez LPN (Licensed Nurse)       Date of Last Office Visit: 10/14/19  Date of Next Office Visit: 4/13/20  No shows since last visit: no  Cancellations since last visit: no  ED visits since last visit: no    Medication Klonopin 0.5 mg date last ordered: 9/6/19  Qty: 31  Refills: 3     Disp Refills Start End ADRIANA   clonazePAM (KLONOPIN) 0.5 MG tablet 31 tablet 3 9/6/2019  No   Sig: TAKE 1 TABLET BY MOUTH AT BEDTIME. *1 TOTAL FILL*   Sent to pharmacy as: clonazePAM (KLONOPIN) 0.5 MG tablet         Lapse in therapy greater than 7 days: no  Medication refill request verified as identical to current order: yes  Result of Last DAM, VPA, Li+ Level, CBC, or Carbamazepine Level (at or since last visit): N/A        []Eligibility - not seen in last year    []Supervision - no future appointment    []Compliance     []Verification - order discrepancy    [x]Controlled Medication    []90 - day supply request    [x]Other LPN pending medications    Current Medication list:    albuterol (ACCUNEB) 1.25 mg/3 mL nebulizer solution Take 3 mL (1.25 mg total) by nebulization 3 (three) times a day.   ARIPiprazole (ABILIFY) 5 MG tablet Take 1 tablet (5 mg total) by mouth daily.   calcium carbonate-vit D3-min (CALCIUM-VITAMIN D) 600 mg calcium- 400 unit Tab Take 1 tablet by mouth 2 (two) times a day.    carBAMazepine (TEGRETOL) 200 mg tablet Take 200 mg by mouth 2 (two) times a day. Take 1 tab at AM and 1500, and 1.5 tabs at bedtime        CHLORHEXIDINE GLUCONATE MM 1 application by Mucous Membrane route every morning.   ciprofloxacin-dexamethasone (CIPRODEX) otic suspension Administer 4 drops into both ears 2 (two) times a day as needed.        citalopram (CELEXA) 40 MG tablet TAKE ONE  AND ONE-HALF TABLETS (60MG) BY MOUTH ONCE DAILY.   clonazePAM (KLONOPIN) 0.5 MG tablet TAKE 1 TABLET BY MOUTH AT BEDTIME. *1 TOTAL FILL*   clotrimazole-betamethasone (LOTRISONE) cream Apply 1 application topically as needed.   cycloSPORINE (RESTASIS) 0.05 % ophthalmic emulsion Administer 1 drop to both eyes 2 (two) times a day.        diphenhydrAMINE (BENADRYL) 25 mg capsule Take 25 mg by mouth every 6 (six) hours as needed for itching.        divalproex (DEPAKOTE) 500 MG EC tablet Take 1,000 mg by mouth 3 (three) times a day. AM, 1500, HS        docusate sodium (COLACE) 100 MG capsule Take 200 mg by mouth 2 (two) times a day.   esomeprazole (NEXIUM) 20 MG capsule Take 40 mg by mouth daily before breakfast.        hydrocortisone 1 % cream Apply 1 application topically 2 (two) times a day as needed.   levOCARNitine (CARNITOR) 330 mg tablet Take 330 mg by mouth 3 (three) times a day. AM, 1500, HS        LORazepam (ATIVAN) 2 MG tablet Take 2 mg by mouth as needed for anxiety (1 hour prior to procedures that would be upsetting.).   melatonin 5 mg Tab tablet TAKE 2 TABLETS (10MG) BY MOUTH AT BEDTIME **NON-COVERED MED** *ORDER WHEN LOW*   mirtazapine (REMERON) 30 MG tablet Take 1 tablet (30 mg total) by mouth at bedtime.   MULTIVIT WITH CALCIUM,IRON,MIN (TAB A TENZIN ORAL) Take 1 tablet by mouth daily.   senna (SENOKOT) 8.6 mg tablet Take 1 tablet by mouth 2 (two) times a day.   sodium fluoride (DENTAGEL) 1.1 % Gel dental gel Apply 1 application to teeth every morning.        zolpidem (AMBIEN) 10 mg tablet TAKE 1 TABLET BY MOUTH AT BEDTIME *6 TOTAL FILLS*   melatonin 10 mg Tab (Discontinued) Take 10 mg by mouth at bedtime.         Medication Plan of Care at last office visit with MD/CNP:    PLAN:     Plan:  Medication Adjustment:  We will continue with the current medication but will consider an increase in Abilify in the future if the patient is struggling.     Other:   Patient will return to clinic in 3 months for med  check.  If patient is tolerating the medication but has had no significant improvement in behaviors we will consider an increase in the medication prior to the next appointment.  The staff stated they would call us should they desire that.  The staff agree to call or return sooner if questions concerns or problems arise.         RENAE GREENWOOD, [place], and [place] :

## 2021-06-16 NOTE — TELEPHONE ENCOUNTER
Telephone Encounter by Madina Plaza RN at 8/12/2019  1:43 PM     Author: Bola, Madina N, RN Service: Behavioral Author Type: Registered Nurse    Filed: 8/12/2019  1:49 PM Encounter Date: 8/12/2019 Status: Signed    : Madina Plaza RN (Registered Nurse)       Date of Last Office Visit: 7/15/19  Date of Next Office Visit: 10/14/19  No shows since last visit: 0  Cancellations since last visit: 0  ED visits since last visit:  0    Medication clonazepam 0.5 mg date last ordered: 7/15/19  Qty: 30  Refills: 0    Lapse in therapy greater than 7 days: No  Medication refill request verified as identical to current order: Yes  Result of Last DAM, VPA, Li+ Level, CBC, or Carbamazepine Level (at or since last visit): N/A     [] Medication refilled per [facility] M-1.   [x] Medication unable to be refilled by RN due to criteria not met as indicated below:     []Eligibility - not seen in last year    []Supervision - no future appointment    []Compliance     []Verification - order discrepancy    [x]Controlled Medication    []Medication not included in RN Protocol    []90 - day supply request    []Other   Current Medication list:  Michell Mccann    (MRN [id number])   Your Current Medications Are     albuterol (ACCUNEB) 1.25 mg/3 mL nebulizer solution Take 3 mL (1.25 mg total) by nebulization 3 (three) times a day.   ARIPiprazole (ABILIFY) 5 MG tablet Take 1 tablet (5 mg total) by mouth daily.   calcium carbonate-vit D3-min (CALCIUM-VITAMIN D) 600 mg calcium- 400 unit Tab Take 1 tablet by mouth 2 (two) times a day.    carBAMazepine (TEGRETOL) 200 mg tablet Take 200 mg by mouth 2 (two) times a day. Take 1 tab at AM and 1500, and 1.5 tabs at bedtime        CHLORHEXIDINE GLUCONATE MM 1 application by Mucous Membrane route every morning.   ciprofloxacin-dexamethasone (CIPRODEX) otic suspension Administer 4 drops into both ears 2 (two) times a day as needed.        citalopram (CELEXA) 40 MG tablet TAKE ONE AND ONE-HALF  TABLETS (60MG) BY MOUTH ONCE DAILY.   clonazePAM (KLONOPIN) 0.5 MG tablet Take 1 tablet (0.5 mg total) by mouth at bedtime.   clotrimazole-betamethasone (LOTRISONE) cream Apply 1 application topically as needed.   cycloSPORINE (RESTASIS) 0.05 % ophthalmic emulsion Administer 1 drop to both eyes 2 (two) times a day.        diphenhydrAMINE (BENADRYL) 25 mg capsule Take 25 mg by mouth every 6 (six) hours as needed for itching.        divalproex (DEPAKOTE) 500 MG EC tablet Take 1,000 mg by mouth 3 (three) times a day. AM, 1500, HS        docusate sodium (COLACE) 100 MG capsule Take 200 mg by mouth 2 (two) times a day.   esomeprazole (NEXIUM) 20 MG capsule Take 40 mg by mouth daily before breakfast.        hydrocortisone 1 % cream Apply 1 application topically 2 (two) times a day as needed.   levOCARNitine (CARNITOR) 330 mg tablet Take 330 mg by mouth 3 (three) times a day. AM, 1500, HS        LORazepam (ATIVAN) 2 MG tablet Take 2 mg by mouth as needed for anxiety (1 hour prior to procedures that would be upsetting.).   melatonin 10 mg Tab Take 10 mg by mouth at bedtime.   mirtazapine (REMERON) 30 MG tablet Take 1 tablet (30 mg total) by mouth at bedtime.   MULTIVIT WITH CALCIUM,IRON,MIN (TAB A TENZIN ORAL) Take 1 tablet by mouth daily.   senna (SENOKOT) 8.6 mg tablet Take 1 tablet by mouth 2 (two) times a day.   sodium fluoride (DENTAGEL) 1.1 % Gel dental gel Apply 1 application to teeth every morning.        zolpidem (AMBIEN) 10 mg tablet TAKE 1 TABLET BY MOUTH AT BEDTIME. *6 TOTAL FILLS*       Medication Plan of Care at last office visit with MD/CNP:    Unable to generate ; no provider access.

## 2021-06-16 NOTE — PROGRESS NOTES
Outpatient Followup Psychiatric Evaluation      Pertinent History: She has a long-standing history of developmental delay as well as atypical psychosis and a history of very significant behavioral dyscontrol and mood instability.  2 visits ago we did increase the Remeron to 30 mg.  We did not make any changes at the last visit.       Current Symptoms:   Was unable to provide any history.  The staff report there is been no significant change in the patient.  Cognitive deficits and behavioral issues continue.  Perhaps she is hitting herself a bit more often.  She is typically directable and the staff do not see any specific targets to address regarding the patient's behavioral issues.  There is no obvious antecedents.  She continues to sleep relatively well at 6 hours a night.  There is been no change in cognition no obvious hallucinations.  She typically is directable and comfortable.  The staff are requesting no change in the medication at this time.  We again discussed the current medication regime and the risks and benefits of medication changes.    Current Medications: Please see chart. Medications personally reviewed.    Medication Compliance: YES    Side Effects to Medications:  NO      Vitals:  Wt Readings from Last 3 Encounters:   06/26/17 182 lb (82.6 kg)   05/18/15 160 lb (72.6 kg)   05/06/15 158 lb (71.7 kg)     Temp Readings from Last 3 Encounters:   01/06/15 97.6  F (36.4  C) (Axillary)     BP Readings from Last 3 Encounters:   03/26/18 137/80   11/23/15 131/78   05/18/15 134/79     Pulse Readings from Last 3 Encounters:   03/26/18 61   11/23/15 70   05/18/15 64         Mental Status Exam:   Patient was not yelling out but was grunting.  She again was distractible disinterested and not able to attend or participate.  She was nonverbal other than the grunting.  She moved from chair to chair in the room.  She was directable with some effort.  Not obviously uncomfortable or short of breath.  Mood was not  obviously depressed but she was perhaps slightly anxious.  Not currently agitated.  Thought content does not show any hallucinations or delusions.  No suicidal or homicidal ideation.  Thought formation does not show the patient to be oriented.  She is confused and disorganized and not able to track or follow.  Insight, judgment and memory as well as fund of knowledge are all baseline impaired and unchanged.    Diagnosis managed and treated at today's visit :    Major neurocognitive disorder disorder, NOS with resultant mood disturbance and behavioral dyscontrol.  Atypical psychosis    Developmental delay, by history     Plan:  Medication Adjustment:  No changes at this time.    Other:   Patient will return to clinic in 6 months for med check.  The staff agree to call or return sooner if questions concerns or problems arise.      Continue with the support of the clinic, reassurance, and redirection. Staff monitoring and ongoing assessments per team plan. Current psychotropic medication appears to represent the minimum effective dosage and appears medically necessary. We will continue to monitor and reassess. This team will utilize appropriate emergency services if necessary. I will make myself available if concerns or problems arise.    Casa Bustos MD

## 2021-06-16 NOTE — PROGRESS NOTES
Pt was accompanied by two group home support staff. Pt is restless but was able to sit still for a few minutes while this nurse took her BP. Staff reports that pt has been screaming a lot in evening and has been hitting herself a lot more.     Correct pharmacy verified with patient and confirmed in snapshot? [x] yes []no    Charge captured ? [x] yes  [] no    Medications Phoned  to Pharmacy [] yes [x]no  Name of Pharmacist:  List Medications, including dose, quantity and instructions      Medication Prescriptions given to patient   [] yes  [x] no   List the name of the drug the prescription was written for.       Medications ordered this visit were e-scribed.  Verified by order class [] yes  [x] no    Medication changes or discontinuations were communicated to patient's pharmacy: [] yes  [x] no    UA collected [] yes  [x] no    [place] Prescription Monitoring Program Reviewed? [] yes  [x] no    Referrals were made to:  none  Future appointment was made: [x] yes  [] no  9/24/18  Dictation completed at time of chart check: [] yes  [x] no    I have checked the documentation for today s encounters and the above information has been reviewed and completed.

## 2021-06-16 NOTE — TELEPHONE ENCOUNTER
Telephone Encounter by Lili Finnegan CMA at 12/20/2019 10:07 AM     Author: Lili Finnegan CMA Service: -- Author Type: Certified Medical Assistant    Filed: 12/20/2019 10:14 AM Encounter Date: 12/20/2019 Status: Signed    : Lili Finnegan CMA (Certified Medical Assistant)       Date of Last Office Visit: 10/14/19  Date of Next Office Visit: 4/13/2020  No shows since last visit: 0  Cancellations since last visit: 0  ED visits since last visit: 0   Medication zolpidem 10 mg tablet date last ordered: 6/28/19  Qty: 31  Refills: 5  Lapse in therapy greater than 7 days: no  Medication refill request verified as identical to current order: Yes  Result of Last DAM, VPA, Li+ Level, CBC, or Carbamazepine Level (at or since last visit): NA     [] Medication refilled per [facility] M-1.   [] Medication unable to be refilled by RN due to criteria not met as indicated below:     []Eligibility - not seen in last year    []Supervision - no future appointment    []Compliance     []Verification - order discrepancy    [x]Controlled Medication    []Medication not included in RN Protocol    []90 - day supply request    [x]Other CMA pending medication refills   Current Medication list:    albuterol (ACCUNEB) 1.25 mg/3 mL nebulizer solution Take 3 mL (1.25 mg total) by nebulization 3 (three) times a day.   ARIPiprazole (ABILIFY) 5 MG tablet Take 1 tablet (5 mg total) by mouth daily.   calcium carbonate-vit D3-min (CALCIUM-VITAMIN D) 600 mg calcium- 400 unit Tab Take 1 tablet by mouth 2 (two) times a day.    carBAMazepine (TEGRETOL) 200 mg tablet Take 200 mg by mouth 2 (two) times a day. Take 1 tab at AM and 1500, and 1.5 tabs at bedtime        CHLORHEXIDINE GLUCONATE MM 1 application by Mucous Membrane route every morning.   ciprofloxacin-dexamethasone (CIPRODEX) otic suspension Administer 4 drops into both ears 2 (two) times a day as needed.        citalopram (CELEXA) 40 MG tablet TAKE ONE AND ONE-HALF TABLETS (60MG) BY MOUTH ONCE  DAILY.   clonazePAM (KLONOPIN) 0.5 MG tablet TAKE 1 TABLET BY MOUTH AT BEDTIME. *1 TOTAL FILL*   clotrimazole-betamethasone (LOTRISONE) cream Apply 1 application topically as needed.   cycloSPORINE (RESTASIS) 0.05 % ophthalmic emulsion Administer 1 drop to both eyes 2 (two) times a day.        diphenhydrAMINE (BENADRYL) 25 mg capsule Take 25 mg by mouth every 6 (six) hours as needed for itching.        divalproex (DEPAKOTE) 500 MG EC tablet Take 1,000 mg by mouth 3 (three) times a day. AM, 1500, HS        docusate sodium (COLACE) 100 MG capsule Take 200 mg by mouth 2 (two) times a day.   esomeprazole (NEXIUM) 20 MG capsule Take 40 mg by mouth daily before breakfast.        hydrocortisone 1 % cream Apply 1 application topically 2 (two) times a day as needed.   levOCARNitine (CARNITOR) 330 mg tablet Take 330 mg by mouth 3 (three) times a day. AM, 1500, HS        LORazepam (ATIVAN) 2 MG tablet Take 2 mg by mouth as needed for anxiety (1 hour prior to procedures that would be upsetting.).   melatonin 10 mg Tab Take 10 mg by mouth at bedtime.   mirtazapine (REMERON) 30 MG tablet Take 1 tablet (30 mg total) by mouth at bedtime.   MULTIVIT WITH CALCIUM,IRON,MIN (TAB A TENZIN ORAL) Take 1 tablet by mouth daily.   senna (SENOKOT) 8.6 mg tablet Take 1 tablet by mouth 2 (two) times a day.   sodium fluoride (DENTAGEL) 1.1 % Gel dental gel Apply 1 application to teeth every morning.        zolpidem (AMBIEN) 10 mg tablet TAKE 1 TABLET BY MOUTH AT BEDTIME. *6 TOTAL FILLS*     [place] :    Medication Plan of Care at last office visit with MD/CNP:    Plan:  Medication Adjustment:  We will continue with the current medication but will consider an increase in Abilify in the future if the patient is struggling.     Other:   Patient will return to clinic in 3 months for med check.  If patient is tolerating the medication but has had no significant improvement in behaviors we will consider an increase in the medication prior to the next  appointment.  The staff stated they would call us should they desire that.  The staff agree to call or return sooner if questions concerns or problems arise.

## 2021-06-17 NOTE — TELEPHONE ENCOUNTER
Avinash called on behalf of patient.  States that patients Zolpidem is now written as a PRN.  This is the first time she has heard of this change.  She is looking for guidance on if it has indeed been changed to PRN, and if there are any further instructions.    Avinash's # [phone] (she states that you can speak with anyone who answers, and you can leave a detailed message).    [facility] # [phone]

## 2021-06-17 NOTE — TELEPHONE ENCOUNTER
Spoke with Avinash, group home staff. She states that previously, the order for zolpidem hasn't been prescribed as PRN, and was prescribed to be given every night at bedtime. They would like it switched back, otherwise she is concerned that staff will be inconsistent in giving the pt this medication and pt heavily relies on it for sleep. They also want to relay that pt's anxiety has been increased lately, to the point that she can't sit still long enough to eat and is still only sleeping 2-3 hours a night.
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
## 2021-06-20 NOTE — PROGRESS NOTES
Outpatient Followup Psychiatric Evaluation      Pertinent History: She has a long-standing history of developmental delay as well as atypical psychosis and a history of very significant behavioral dyscontrol and mood instability.  Over the winter 2018 we did increase the Remeron to 30 mg.  No other recent changes.       Current Symptoms:   Able to provide any history.  The staff report that over the last month or 2 the patient has had increased hyperactivity.  She has been more difficult to redirect.  She has been sleeping last.  She did have a seizure last night.  Seizures are unusual for her and tend to happen when she goes for periods without sleeping well.  There is been no obvious change in cognition.  No new medical issues or concerns.  No obvious side effects to the medication.  We discussed a variety of possible treatment approaches.  At this point we are going to increase the patient's melatonin and I am going to increase the Risperdal to 2 mg 3 times a day.  Risks and benefits were discussed.    Current Medications: Please see chart. Medications personally reviewed.    Medication Compliance: YES    Side Effects to Medications:  NO      Vitals:  Wt Readings from Last 3 Encounters:   06/26/17 182 lb (82.6 kg)   05/18/15 160 lb (72.6 kg)   05/06/15 158 lb (71.7 kg)     Temp Readings from Last 3 Encounters:   01/06/15 97.6  F (36.4  C) (Axillary)     BP Readings from Last 3 Encounters:   03/26/18 137/80   11/23/15 131/78   05/18/15 134/79     Pulse Readings from Last 3 Encounters:   03/26/18 61   11/23/15 70   05/18/15 64         Mental Status Exam:   Appearance: The patient is inattentive and not able to participate.  Rocking back and forth and tries to get up.  She needs frequent redirection.  The patient appears relatively comfortable.  Slightly anxious and irritable.  No shortness of breath. No restlessness. The patient does not respond to voice. No obvious pain. No obvious shortness of breath.  Behavior:   The patient does not appear to be uncomfortable. Not able to participate.  She is restless.  Recent increase in restlessness and difficulty with redirection.  Speech:  Not able to participate at this time. The patient does not respond to voice. The patient makes no attempts to communicate.  Mood/Affect:  Flat and slow.  The patient does not appear to be agitated.  Perhaps somewhat anxious.  No lability at this time.  Thought Content:  No evidence of psychosis. No reports of any recent psychosis.  Suicidal or Homicidal Thoughts:  The patient is not able to participate. No apparent or reported evidence of any suicidal or homicidal ideation.  Thought Process/Formulation:  Inattentive. Not able to participate. No evidence of any racing thoughts.  Associations: Distractible and disinterested.  Mostly inattentive.  Not able to participate.  Fund of Knowledge:  Not able to participate. No reports of any recent significant change.  Attention/Concentration:  Inattentive. No participation.  Insight:  Not able to participate.  Reportedly continues impaired with no reports of any recent change.   Judgement:  Not able to participate at this time.  Reportedly continues impaired.   Memory:  No participation.   Motor Status:  Currently not able to participate. No tremor.   Orientation: Not able to participate.  Continues impaired by report.  Not able to demonstrate orientation.     Diagnosis managed and treated at today's visit :    Major neurocognitive disorder disorder, NOS with resultant mood disturbance and behavioral dyscontrol.  Atypical psychosis    Developmental delay, by history     Plan:  Medication Adjustment:  I am going to increase the patient's melatonin to 10 mg at night.  The patient had been on 5 mg at night by another provider with some initial improvement in sleep which is trailed off.  We are also going to increase the Risperdal as described above to 2 mg 3 times a day.    Other:   Patient will return to clinic  in 3-4 months for med check.  The staff agree to call or return sooner if questions concerns or problems arise.      Continue with the support of the clinic, reassurance, and redirection. Staff monitoring and ongoing assessments per team plan. Current psychotropic medication appears to represent the minimum effective dosage and appears medically necessary. We will continue to monitor and reassess. This team will utilize appropriate emergency services if necessary. I will make myself available if concerns or problems arise.    Casa Bustos MD

## 2021-06-20 NOTE — PROGRESS NOTES
Correct pharmacy verified with patient and confirmed in snapshot? [x] yes []no    Charge captured ? [x] yes  [] no    Medications Phoned  to Pharmacy [] yes [x]no  Name of Pharmacist:  List Medications, including dose, quantity and instructions      Medication Prescriptions given to patient   [] yes  [x] no   List the name of the drug the prescription was written for.       Medications ordered this visit were e-scribed.  Verified by order class [x] yes  [] no  Melatonin 10 mg OTC; Risperdal 2 mg   Medication changes or discontinuations were communicated to patient's pharmacy: [] yes  [x] no    UA collected [] yes  [x] no    [place] Prescription Monitoring Program Reviewed? [] yes  [x] no    Referrals were made to:  None    Future appointment was made: [x] yes  [] no  01/07/2019  Dictation completed at time of chart check: [x] yes  [] no    I have checked the documentation for today s encounters and the above information has been reviewed and completed.

## 2021-06-22 NOTE — PROGRESS NOTES
Pt is here for psychiatric follow up and medication management.  Tried to obtain vitals but due to body movements unable to get.    Behaviors have increased since September.     Behaviors recommendations have been made from an outside source and looking for provider's input. Staff did not bring in report but agree to fax this in to provider.   Staff reports increase in agitation. Seems to tired throughout the day and sleep good at night.           [place] :  Unable to obtain for visit today due to recent process change with  program.

## 2021-06-22 NOTE — PROGRESS NOTES
CORRECTED CHART CHECK    Correct pharmacy verified with patient and confirmed in snapshot? [x] yes []no    Charge captured ? [x] yes  [] no    Medications Phoned  to Pharmacy [] yes [x]no  Name of Pharmacist:  List Medications, including dose, quantity and instructions      Medication Prescriptions given to patient   [] yes  [x] no   List the name of the drug the prescription was written for.       Medications ordered this visit were e-scribed.  Verified by order class [x] yes  [] no  Celexa 40 mg; Klonopin 0.5 mg; Melatonin 10 mg; Remeron 30 mg; Zolpidem 10 mg;  Risperdal 0.5 mg   Medication changes or discontinuations were communicated to patient's pharmacy: [x] yes  [] no   Risperdal 0.5 mg for 2 weeks and then DC. Canceled future refills as well. Sent in error.  Talked with Aron at [facility] to DC remaining refills and decreased qty 42 tablets    UA collected [] yes  [x] no     [place] Prescription Monitoring Program Reviewed? [] yes  [x] no    Referrals were made to:  None    Future appointment was made: [x] yes  [] no  04/08/2019  Dictation completed at time of chart check: [x] yes  [] no    I have checked the documentation for today s encounters and the above information has been reviewed and completed.

## 2021-06-22 NOTE — TELEPHONE ENCOUNTER
Incoming fax received from [facility] for providers review, stickered and placed in mail folder in core for Dr Bustos

## 2021-06-22 NOTE — PROGRESS NOTES
Outpatient Followup Psychiatric Evaluation      Pertinent History: She has a long-standing history of developmental delay as well as atypical psychosis and a history of very significant behavioral dyscontrol and mood instability.  Over the winter 2018 we did increase the Remeron to 30 mg.      I saw the patient in September 2018.  At that time we increased the patient's Risperdal as well as the patient's melatonin due to some insomnia and behavioral difficulties.       Current Symptoms:   The patient continues with behavioral difficulties and the staff report they have gotten worse since September.  He apparently had a seizure in September and she has had frequent urinary tract infections since then.  Also, they report that the patient was having insomnia but with the medication changes as described above she now sleeps all night and wants to stay in bed all day.  She becomes combative with redirection.  No obvious change in cognition.  No obvious psychosis.  There is been no other new medical issues and no other side effects to the medication.    The team apparently had a outside second opinion regarding the recent medication changes and they saw the increase in Risperdal that we did in September is not particularly helpful obviously and somewhat problematic.  They recommended reversing that decision and possibly trying her off Risperdal.  We will do that.  Current Medications: Please see chart. Medications personally reviewed.    Medication Compliance: YES    Side Effects to Medications:  NO      Vitals:  Wt Readings from Last 3 Encounters:   01/07/19 172 lb (78 kg)   09/24/18 178 lb (80.7 kg)   06/26/17 182 lb (82.6 kg)     Temp Readings from Last 3 Encounters:   01/06/15 97.6  F (36.4  C) (Axillary)     BP Readings from Last 3 Encounters:   09/24/18 104/52   03/26/18 137/80   11/23/15 131/78     Pulse Readings from Last 3 Encounters:   09/24/18 72   03/26/18 61   11/23/15 70         Mental Status Exam:    Appearance: The patient is inattentive and not able to participate.  He was distractible.  She was rocking back and forth in the chair.  Not short of breath.  The patient does not respond to voice. No obvious pain. No obvious shortness of breath.  Behavior:  The patient does not appear to be uncomfortable. Not able to participate. No restlessness or agitation.  Recent increase in restlessness and agitation with attempts at redirection.  Please see above.  Not able to participate.  Speech:  Not able to participate at this time. The patient does not respond to voice.  Occasional grunting.  The patient makes no attempts to communicate.  Mood/Affect:  Flat and slow.  The patient does not appear to be agitated. No obvious anxiety. No lability at this time.  Thought Content:  No evidence of psychosis. No reports of any recent psychosis.  Exam is difficult however.  Suicidal or Homicidal Thoughts:  The patient is not able to participate. No apparent or reported evidence of any suicidal or homicidal ideation.  Thought Process/Formulation:  Inattentive.  Distractible.  Not able to participate. No evidence of any racing thoughts.  Associations:  Inattentive. Not able to participate.  Fund of Knowledge:  Not able to participate. No reports of any recent significant change.  Attention/Concentration:  Inattentive. No participation.  Insight:  Not able to participate.  Reportedly continues impaired with no reports of any recent change.   Judgement:  Not able to participate at this time.  Reportedly continues impaired.   Memory:  No participation.   Motor Status:  Currently not able to participate. No tremor.   Orientation: Not able to participate.  Continues impaired by report.  Not able to demonstrate orientation.     Diagnosis managed and treated at today's visit :    Major neurocognitive disorder disorder, NOS with resultant mood disturbance and behavioral dyscontrol.  Atypical psychosis    Developmental delay, by history      Plan:  Medication Adjustment:  I am going to decrease the patient's Risperdal to 1 mg 3 times a day for 2 weeks and then discontinue.    Other:   Patient will return to clinic in 3 months for med check.  The staff agree to call or return sooner if questions concerns or problems arise.      Continue with the support of the clinic, reassurance, and redirection. Staff monitoring and ongoing assessments per team plan. Current psychotropic medication appears to represent the minimum effective dosage and appears medically necessary. We will continue to monitor and reassess. This team will utilize appropriate emergency services if necessary. I will make myself available if concerns or problems arise.    Casa Bustos MD

## 2021-06-22 NOTE — PROGRESS NOTES
Correct pharmacy verified with patient and confirmed in snapshot? [x] yes []no    Charge captured ? [x] yes  [] no    Medications Phoned  to Pharmacy [] yes [x]no  Name of Pharmacist:  List Medications, including dose, quantity and instructions      Medication Prescriptions given to patient   [] yes  [x] no   List the name of the drug the prescription was written for.       Medications ordered this visit were e-scribed.  Verified by order class [x] yes  [] no  Celexa 40 mg; Klonopin 0.5 mg; Melatonin 10 mg; Remeron 30 mg; Risperdal 2 mg; Zolpidem 10 mg   Medication changes or discontinuations were communicated to patient's pharmacy: [] yes  [x] no    UA collected [] yes  [x] no    [place] Prescription Monitoring [place] Reviewed? [] yes  [x] no    Referrals were made to:  None    Future appointment was made: [x] yes  [] no  04/08/2019  Dictation completed at time of chart check: [x] yes  [] no    I have checked the documentation for today s encounters and the above information has been reviewed and completed.

## 2021-06-23 NOTE — TELEPHONE ENCOUNTER
Order sent for nebulizer machine and supplies to pharmacy on file.     María Castellano DO (PGY2)  [facility] Medicine Resident  Pager: [phone]

## 2021-06-23 NOTE — TELEPHONE ENCOUNTER
"Roro Garcia - Pt's Care Provider at [facility] calling.    Pt was discharged from [facility] today (Cardiology ).  Prescribed albuterol nebs.  Pt declined to have an Rx for a nebulizer because the group home has a nebulizer.  Roro just found out that every Pt at [facility] needs their own nebulizer machine.  Roro is requesting to have an Rx for a nebulizer sent to Pt's pharmacy on file.    PLAN  Will route information to Prescribing Provider for the albuterol.  Roro can be reached at [phone]. OK to leave a message on VM.  Kelsea Oneal, RN, [facility] RN Triage/Med Refills    Dr Castellano - Pt needs an Rx for a nebulizer sent to her pharmacy on file.    Reason for Disposition    [1] Request for URGENT new prescription or refill of \"essential\" medication (i.e., likelihood of harm to patient if not taken) AND [2] triager unable to fill per unit policy    Protocols used: MEDICATION QUESTION CALL-A-AH      "

## 2021-06-24 NOTE — TELEPHONE ENCOUNTER
"Roro from [facility] asked that Dr. Bustos change the patient's melatonin order to scheduled at bedtime and remove the \"as needed\" order, as the staff gives the medication every night at bedtime.   "

## 2021-06-29 NOTE — PROGRESS NOTES
Progress Notes by Lili Finnegan CMA at 9/14/2020  9:00 AM     Author: Lili Finnegan CMA Service: -- Author Type: Certified Medical Assistant    Filed: 9/14/2020  9:23 AM Encounter Date: 9/14/2020 Status: Signed    : Lili Finnegan CMA (Certified Medical Assistant)       This video/telephone visit will be conducted via a call between you and your physician/provider. We have found that certain health care needs can be provided without the need for an in-person physical exam. This service lets us provide the care you need with a video /telephone conversation. If a prescription is necessary we can send it directly to your pharmacy. If lab work is needed we can place an order for that and you can then stop by our lab to have the test done at a later time.   Just as we bill insurance for in-person visits, we also bill insurance for video/telephone visits. If you have questions about your insurance coverage, we recommend that you speak with your insurance company.   Patient has given verbal consent for video/Telephone visit?  Yes   Patient would like telephone visit please call: [phone] Sammy PRADO CMA   Staff Pt has been at baseline no real concerns, still cycles as her usual.       Staff Sammy verified allergies, medications and pharmacy via phone.  Staff states patient is ready for visit.  [place]  was reviewed prior to seeing patient today. See below for embedded report.

## 2021-06-30 NOTE — PROGRESS NOTES
Progress Notes by Lili Finnegan CMA at 3/15/2021 10:30 AM     Author: Lili Finnegan CMA Service: -- Author Type: Certified Medical Assistant    Filed: 3/15/2021 10:42 AM Encounter Date: 3/15/2021 Status: Signed    : Lili Finnegan CMA (Certified Medical Assistant)         This video/telephone visit will be conducted via a call between you and your physician/provider. We have found that certain health care needs can be provided without the need for an in-person physical exam. This service lets us provide the care you need with a video /telephone conversation. If a prescription is necessary we can send it directly to your pharmacy. If lab work is needed we can place an order for that and you can then stop by our lab to have the test done at a later time.   Just as we bill insurance for in-person visits, we also bill insurance for video/telephone visits. If you have questions about your insurance coverage, we recommend that you speak with your insurance company.   Patient has given verbal consent for video/Telephone visit? Yes   Patient would like telephone visit please call: [phone] Staff will answer phone.   AP/ISIDRO PRADO CMA   Per Staff no new concerns. Medications are pended for refills.   Guardian is aware per Staff   Patient verified allergies, medications and pharmacy via phone. Patient's staff states she is ready for visit.  [place]  was reviewed prior to seeing patient today. See below for embedded report.

## 2021-07-03 NOTE — ADDENDUM NOTE
Addendum Note by Casa Donis MD at 7/17/2019  7:31 AM     Author: Casa Donis MD Service: -- Author Type: Physician    Filed: 7/17/2019  7:31 AM Encounter Date: 7/16/2019 Status: Signed    : Casa Donis MD (Physician)    Addended by: CASA DONIS on: 7/17/2019 07:31 AM        Modules accepted: Orders

## 2021-09-13 ENCOUNTER — VIRTUAL VISIT (OUTPATIENT)
Dept: BEHAVIORAL HEALTH | Facility: CLINIC | Age: 61
End: 2021-09-13
Payer: MEDICARE

## 2021-09-13 DIAGNOSIS — F39 MOOD DISORDER (H): Primary | ICD-10-CM

## 2021-09-13 PROCEDURE — 99443 PR PHYSICIAN TELEPHONE EVALUATION 21-30 MIN: CPT | Mod: 95 | Performed by: PSYCHIATRY & NEUROLOGY

## 2021-09-13 NOTE — PROGRESS NOTES
This video/telephone visit will be conducted via a call between you and your physician/provider. We have found that certain health care needs can be provided without the need for an in-person physical exam. This service lets us provide the care you need with a video /telephone conversation. If a prescription is necessary we can send it directly to your pharmacy. If lab work is needed we can place an order for that and you can then stop by our lab to have the test done at a later time.    Just as we bill insurance for in-person visits, we also bill insurance for video/telephone visits. If you have questions about your insurance coverage, we recommend that you speak with your insurance company.    Patient has given verbal consent for Telephone visit? Yes, accompained by staff  Patient would like the telephone invitation sent by: Text to cell phone:  [phone]  Sherice GREENWOOD  reviewed  Staff report patient is stable, still has episodes of trouble sleeping but remains stable. Patient not on Risperdal or Benadryl.     Patient verified allergies, medications and pharmacy via phone. Patient states she  is ready for visit.

## 2021-09-13 NOTE — PROGRESS NOTES
Pertinent History: She has a long-standing history of developmental delay as well as atypical psychosis and a history of very significant behavioral dyscontrol and mood instability.  Over the winter 2018 we did increase the Remeron to 30 mg.       I saw the patient in September 2018.  At that time we increased the patient's Risperdal as well as the patient's melatonin due to some insomnia and behavioral difficulties.     I saw the patient in January 2018.  At that time we did taper off the patient's Risperdal over the next couple of weeks due to some ongoing behavioral difficulties despite that trial.  She had had a seizure in September and frequent urinary tract infections since then which may have contributed.  She was staying in bed much of the day despite sleeping well at night and was combative with redirection at that time.     I saw the patient in May 2019.  At that time we continued with the Resporal.  We were considering a change to Abilify.  Over the phone in early June given the patient's ongoing difficulties we did discontinue the Risperdal and started the patient on low-dose Abilify.       I saw the patient in July 2019.  There is been some improvement in her appetite but continued to be restless and periodically agitated and possibly hypomanic.  She had tolerated the recently started Abilify.  We did increase that to 2 mg twice a day.  Since that visit we did change the Abilify to 5 mg at night only.      I saw the patient in the winter of 2020. We did not make any medication changes at that time but did consider an increase in her psychotropic medication. Tiffany reportedly improved with her auditory hallucinations but still had some periods of restlessness and agitation although they were less intense. The primary care doctor was considering in EKG at the last visit due to the patient's medication list.     I saw the patient for a virtual visit on July 13 of 2020. She was doing fairly well at that  time and we did not make any medication changes. She continued to be followed through her medical team as well.     The patient had an appointment by phone in September 2020.  There was no significant change in the patient's presentation and she was tolerating the current treatment plan.  She still was having cyclical behavioral difficulties.  The team was working on sleep hygiene issues.  We did not make any medication changes at that visit.    I saw the patient in March 2021.  There were no significant changes at that time.  She was tolerating the medication.  We continued with the treatment plan and medical follow-up.      Current Symptoms:   I had an opportunity to interview Casa, the patient's staff by phone today.  She told me the patient was doing quite well but the patient's brother did pass away from [facility].  They have been living in the same house since 1998.  The patient has shown some adjustment issues to the change in schedule and presence of her brother.  She however seems to be back at baseline.  There is been no change in the patient's cognition.  Communication is still quite difficult.  The patient was treated for an infection about a month ago but is now off antibiotics and seems to be doing well.  Otherwise there is been no new medical issues or concerns.  No obvious psychosis.  Patient is directable and seems comfortable.  No new allergies.  The staff would like to continue with the current treatment plan.    The patient continues with labs through her primary care doctor and they are monitoring seizure medications.  The labs have reportedly all been reassuring.        Current Medications: Please see chart. Medications personally reviewed.     Medication Compliance: YES     Side Effects to Medications:  NO        Vitals:      Wt Readings from Last 3 Encounters:   10/14/19 134 lb (60.8 kg)   07/15/19 130 lb (59 kg)   04/08/19 156 lb (70.8 kg)          Temp Readings from Last 3 Encounters:    01/24/19 97.8  F (36.6  C) (Oral)   01/22/19 98.8  F (37.1  C) (Axillary)   01/06/15 97.6  F (36.4  C) (Axillary)          BP Readings from Last 3 Encounters:   07/15/19 (!) 128/96   01/24/19 136/66   01/22/19 115/86          Pulse Readings from Last 3 Encounters:   07/15/19 (!) 57   01/24/19 85   01/22/19 84            Mental Status Exam:   Appearance: Staff were interviewed by the phone.  Behavior:  Patient has been directable and relatively calm.  No significant changes  Speech:  Patient was unable to participate.  No reports of any recent change.  Communication is still severely impaired.  Mood/Affect:    Stable mood with no significant changes.  No significant irritability or lability.  Thought Content:  No evidence of psychosis. No reports of any recent psychosis.  No recent changes.  Suicidal or Homicidal Thoughts: No evidence of any suicidal or homicidal thoughts. The patient is not able to participate.  No obvious change.  Not able to participate.  Thought Process/Formulation: No significant change.  Limited interaction.  Associations:  Inattentive. Not able to participate.  Fund of Knowledge:  Not able to participate. No reports of any recent significant change.  Attention/Concentration: Continues severely impaired with no recent changes.  Insight:  Severely impaired.  No recent change.  Judgement:  Severely impaired.  No recent change.  Memory:  Severely impaired. No participation.   Motor Status:  Currently not able to participate. No tremor Reported by staff.   Orientation: Not able to participate.  No reports of any significant change.  Continues impaired by report.  Not able to demonstrate orientation.      Diagnosis managed and treated at today's visit :     Major neurocognitive disorder disorder, NOS with resultant mood disturbance and behavioral dyscontrol.  Atypical psychosis    Developmental delay, by history      Plan:  Medication Adjustment:  I will make no changes at this time.     Staff  interview, chart review and documentation was 21 minutes.     Other:   The patient will return to this clinic in 6 months for medication check.  The staff agreed to call before then with any questions or concerns.     Continue with the support of the clinic, reassurance, and redirection. Staff monitoring and ongoing assessments per team plan. Current psychotropic medication appears to represent the minimum effective dosage and appears medically necessary. We will continue to monitor and reassess. This team will utilize appropriate emergency services if necessary. I will make myself available if concerns or problems arise.     Casa Bustos MD

## 2021-09-24 ENCOUNTER — TELEPHONE (OUTPATIENT)
Dept: NEUROLOGY | Facility: CLINIC | Age: 61
End: 2021-09-24

## 2021-09-24 DIAGNOSIS — F41.9 ANXIETY: Primary | ICD-10-CM

## 2021-09-24 RX ORDER — CLONAZEPAM 1 MG/1
TABLET ORAL
Qty: 15 TABLET | Refills: 5 | Status: SHIPPED | OUTPATIENT
Start: 2021-09-24 | End: 2022-04-06

## 2021-09-24 NOTE — TELEPHONE ENCOUNTER
New script was sent.     clonazePAM (KLONOPIN) 1 MG tablet 15 tablet 5 9/24/2021  No   Sig: TAKE 1/2 TABLET (0.5MG) BY MOUTH AT BEDTIME. *6 TOTAL FILLS*   Sent to pharmacy as: clonazePAM 1 MG Oral Tablet (klonoPIN)   Class: E-Prescribe   Notes to Pharmacy: RX AUDIT. PT IS OUT OF REFILLS   Order: [id number]   E-Prescribing Status: Receipt confirmed by pharmacy (9/24/2021 12:30 PM CDT)

## 2021-09-24 NOTE — TELEPHONE ENCOUNTER
Date of Last Office Visit: 9/13/21  Date of Next Office Visit: 3/14/22  No shows since last visit: 0  Cancellations since last visit: 0    Medication requested: Klonopin  Last approved: 3/15/21 Qty: 30 Rx: 5     Disp Refills Start End ADRIANA   clonazePAM (KLONOPIN) 0.5 MG tablet 31 tablet 5 3/15/2021  No   Sig: TAKE 1 TABLET BY MOUTH AT BEDTIME          [place] of [place] ?: yes    Medication last filled date: 9/3/21 Qty filled: 15/30  [place] ?: yes - multiple  If yes, is this a new medication?: no  If yes, name of medication: NA and date filled: NA    Lapse in medication adherence greater than 5 days?: No  If yes, call patient and gather details: NA    Medication refill request verified as identical to current order?: No - same TDD but different strenght  Result of Last DAM, VPA, Li+ Level, CBC, or Carbamazepine Level (at or since last visit): NA    Last visit treatment plan:     Other:   The patient will return to this clinic in 6 months for medication check.  The staff agreed to call before then with any questions or concerns.     Continue with the support of the clinic, reassurance, and redirection. Staff monitoring and ongoing assessments per team plan. Current psychotropic medication appears to represent the minimum effective dosage and appears medically necessary. We will continue to monitor and reassess. This team will utilize appropriate emergency services if necessary. I will make myself available if concerns or problems arise.    []Medication refilled per  Medication Refill in Ambulatory Care  policy.  [x]Medication unable to be refilled by RN due to criteria not met as indicated below:    []Eligibility - not seen in the last year   []Supervision - no future appointment   []Compliance - no shows, cancellations or lapse in therapy   [x]Verification - order discrepancy   [x]Controlled medication   [x]Medication not included in policy   []90-day supply request   [x]Other: LPN is  processing request

## 2021-09-24 NOTE — TELEPHONE ENCOUNTER
Millie calling regarding Klonopin RX- they need to quantity to reflect the med change- please call:    [facility] - [place], [place] - [address]  [phone]

## 2021-11-09 DIAGNOSIS — F39 MOOD DISORDER (H): Primary | ICD-10-CM

## 2021-11-10 RX ORDER — ZOLPIDEM TARTRATE 10 MG/1
TABLET ORAL
Qty: 31 TABLET | Refills: 5 | Status: SHIPPED | OUTPATIENT
Start: 2021-11-10 | End: 2022-06-01

## 2021-11-10 NOTE — TELEPHONE ENCOUNTER
Date of Last Office Visit: 9/13/21  Date of Next Office Visit: 3/14/21  No shows since last visit: none  Cancellations since last visit: none    Medication requested: zolpidem 10mg Date last ordered: 5/19/21 Qty: 30 Refills: 5     [place] of [place] ?: yes  Medication last filled date: 10/26/21 Qty filled: 27  Other [place] substance [place] ?: yes  If yes, is this a new medication?: no    Lapse in medication adherence greater than 5 days?: no  If yes, call patient and gather details:    Medication refill request verified as identical to current order?: yes  Result of Last DAM, VPA, Li+ Level, CBC, or Carbamazepine Level (at or since last visit): N/A    Last visit treatment plan:   Plan:  Medication Adjustment:  I will make no changes at this time.     Staff interview, chart review and documentation was 21 minutes.     Other:   The patient will return to this clinic in 6 months for medication check.  The staff agreed to call before then with any questions or concerns.    []Medication refilled per  Medication Refill in Ambulatory Care  policy.  [x]Medication unable to be refilled by RN due to criteria not met as indicated below:    []Eligibility - not seen in the last year   []Supervision - no future appointment   []Compliance - no shows, cancellations or lapse in therapy   []Verification - order discrepancy   [x]Controlled medication   []Medication not included in policy   []90-day supply request   []Other

## 2021-12-16 ENCOUNTER — TELEPHONE (OUTPATIENT)
Dept: FAMILY MEDICINE | Facility: CLINIC | Age: 61
End: 2021-12-16
Payer: MEDICARE

## 2021-12-16 NOTE — TELEPHONE ENCOUNTER
Spoke with Kacey at [facility], this is not a [facility] patient.  Kacey reports she sent the message incorrectly and has sent a message to correct PCP. She apologized for the mix up.  Berta Ribera RN  [facility]

## 2021-12-16 NOTE — TELEPHONE ENCOUNTER
Reason for Call:  Other call back    Detailed comments:  Kacey from [facility] group [facility] call about Michell hitting herself mostly hands think's she in pain..  Calling to get permission to give max dose of tylenol or ibuprofen.  Please give them a call back    Phone Number Patient can be reached at: Other phone number:  [phone]    Best Time:  asap     Can we leave a detailed message on this number? YES    Call taken on 12/16/2021 at 9:01 AM by Guerline Bejarano

## 2022-01-18 ENCOUNTER — APPOINTMENT (OUTPATIENT)
Dept: CT IMAGING | Facility: HOSPITAL | Age: 62
DRG: 177 | End: 2022-01-18
Attending: HOSPITALIST
Payer: MEDICARE

## 2022-01-18 ENCOUNTER — DOCUMENTATION ONLY (OUTPATIENT)
Dept: HOME HEALTH SERVICES | Facility: CLINIC | Age: 62
End: 2022-01-18
Payer: MEDICARE

## 2022-01-18 ENCOUNTER — PATIENT OUTREACH (OUTPATIENT)
Dept: CARE COORDINATION | Facility: CLINIC | Age: 62
End: 2022-01-18

## 2022-01-18 ENCOUNTER — NURSE TRIAGE (OUTPATIENT)
Dept: NURSING | Facility: CLINIC | Age: 62
End: 2022-01-18
Payer: MEDICARE

## 2022-01-18 ENCOUNTER — HOSPITAL ENCOUNTER (INPATIENT)
Facility: HOSPITAL | Age: 62
LOS: 1 days | Discharge: HOME | DRG: 177 | End: 2022-01-19
Attending: EMERGENCY MEDICINE | Admitting: HOSPITALIST
Payer: MEDICARE

## 2022-01-18 ENCOUNTER — APPOINTMENT (OUTPATIENT)
Dept: RADIOLOGY | Facility: HOSPITAL | Age: 62
DRG: 177 | End: 2022-01-18
Attending: EMERGENCY MEDICINE
Payer: MEDICARE

## 2022-01-18 DIAGNOSIS — R09.02 HYPOXEMIA: ICD-10-CM

## 2022-01-18 DIAGNOSIS — U07.1 INFECTION DUE TO 2019 NOVEL CORONAVIRUS: ICD-10-CM

## 2022-01-18 LAB
ALBUMIN SERPL-MCNC: 2.4 G/DL (ref 3.5–5)
ALP SERPL-CCNC: 93 U/L (ref 45–120)
ALT SERPL W P-5'-P-CCNC: 13 U/L (ref 0–45)
ANION GAP SERPL CALCULATED.3IONS-SCNC: 12 MMOL/L (ref 5–18)
AST SERPL W P-5'-P-CCNC: 32 U/L (ref 0–40)
ATRIAL RATE - MUSE: 80 BPM
BASOPHILS # BLD AUTO: 0.1 10E3/UL (ref 0–0.2)
BASOPHILS NFR BLD AUTO: 1 %
BILIRUB SERPL-MCNC: 0.3 MG/DL (ref 0–1)
BUN SERPL-MCNC: 22 MG/DL (ref 8–22)
C REACTIVE PROTEIN LHE: 29.8 MG/DL (ref 0–0.8)
CALCIUM SERPL-MCNC: 8.5 MG/DL (ref 8.5–10.5)
CARBAMAZEPINE SERPL-MCNC: 7 UG/ML
CHLORIDE BLD-SCNC: 105 MMOL/L (ref 98–107)
CO2 SERPL-SCNC: 24 MMOL/L (ref 22–31)
CREAT SERPL-MCNC: 0.6 MG/DL (ref 0.6–1.1)
D DIMER PPP FEU-MCNC: 1.72 UG/ML FEU (ref 0–0.5)
DIASTOLIC BLOOD PRESSURE - MUSE: NORMAL MMHG
EOSINOPHIL # BLD AUTO: 0.1 10E3/UL (ref 0–0.7)
EOSINOPHIL NFR BLD AUTO: 1 %
ERYTHROCYTE [DISTWIDTH] IN BLOOD BY AUTOMATED COUNT: 14.2 % (ref 10–15)
FERRITIN SERPL-MCNC: 202 NG/ML (ref 10–130)
FLUAV RNA SPEC QL NAA+PROBE: NEGATIVE
FLUBV RNA RESP QL NAA+PROBE: NEGATIVE
GFR SERPL CREATININE-BSD FRML MDRD: >90 ML/MIN/1.73M2
GLUCOSE BLD-MCNC: 76 MG/DL (ref 70–125)
HCT VFR BLD AUTO: 35.1 % (ref 35–47)
HGB BLD-MCNC: 11.3 G/DL (ref 11.7–15.7)
IMM GRANULOCYTES # BLD: 0.2 10E3/UL
IMM GRANULOCYTES NFR BLD: 2 %
INR PPP: 1.15 (ref 0.9–1.15)
INTERPRETATION ECG - MUSE: NORMAL
LACTATE SERPL-SCNC: 1 MMOL/L (ref 0.7–2)
LDH SERPL L TO P-CCNC: 314 U/L (ref 125–220)
LYMPHOCYTES # BLD AUTO: 1.1 10E3/UL (ref 0.8–5.3)
LYMPHOCYTES NFR BLD AUTO: 10 %
MCH RBC QN AUTO: 33 PG (ref 26.5–33)
MCHC RBC AUTO-ENTMCNC: 32.2 G/DL (ref 31.5–36.5)
MCV RBC AUTO: 103 FL (ref 78–100)
MONOCYTES # BLD AUTO: 1 10E3/UL (ref 0–1.3)
MONOCYTES NFR BLD AUTO: 10 %
NEUTROPHILS # BLD AUTO: 8.3 10E3/UL (ref 1.6–8.3)
NEUTROPHILS NFR BLD AUTO: 76 %
NRBC # BLD AUTO: 0 10E3/UL
NRBC BLD AUTO-RTO: 0 /100
P AXIS - MUSE: 61 DEGREES
PLATELET # BLD AUTO: 301 10E3/UL (ref 150–450)
POTASSIUM BLD-SCNC: 4.4 MMOL/L (ref 3.5–5)
PR INTERVAL - MUSE: 152 MS
PROCALCITONIN SERPL-MCNC: 0.05 NG/ML (ref 0–0.49)
PROT SERPL-MCNC: 6.8 G/DL (ref 6–8)
QRS DURATION - MUSE: 84 MS
QT - MUSE: 410 MS
QTC - MUSE: 472 MS
R AXIS - MUSE: 18 DEGREES
RBC # BLD AUTO: 3.42 10E6/UL (ref 3.8–5.2)
SARS-COV-2 RNA RESP QL NAA+PROBE: POSITIVE
SODIUM SERPL-SCNC: 141 MMOL/L (ref 136–145)
SYSTOLIC BLOOD PRESSURE - MUSE: NORMAL MMHG
T AXIS - MUSE: 35 DEGREES
TROPONIN I SERPL-MCNC: <0.01 NG/ML (ref 0–0.29)
VALPROATE SERPL-MCNC: 52.5 UG/ML
VENTRICULAR RATE- MUSE: 80 BPM
WBC # BLD AUTO: 10.6 10E3/UL (ref 4–11)

## 2022-01-18 PROCEDURE — 83615 LACTATE (LD) (LDH) ENZYME: CPT | Performed by: EMERGENCY MEDICINE

## 2022-01-18 PROCEDURE — 85610 PROTHROMBIN TIME: CPT | Performed by: EMERGENCY MEDICINE

## 2022-01-18 PROCEDURE — 87636 SARSCOV2 & INF A&B AMP PRB: CPT | Performed by: EMERGENCY MEDICINE

## 2022-01-18 PROCEDURE — 99285 EMERGENCY DEPT VISIT HI MDM: CPT | Mod: 25

## 2022-01-18 PROCEDURE — 82728 ASSAY OF FERRITIN: CPT | Performed by: EMERGENCY MEDICINE

## 2022-01-18 PROCEDURE — 85379 FIBRIN DEGRADATION QUANT: CPT | Performed by: HOSPITALIST

## 2022-01-18 PROCEDURE — 99223 1ST HOSP IP/OBS HIGH 75: CPT | Performed by: HOSPITALIST

## 2022-01-18 PROCEDURE — 80156 ASSAY CARBAMAZEPINE TOTAL: CPT | Performed by: EMERGENCY MEDICINE

## 2022-01-18 PROCEDURE — 84484 ASSAY OF TROPONIN QUANT: CPT | Performed by: EMERGENCY MEDICINE

## 2022-01-18 PROCEDURE — 250N000011 HC RX IP 250 OP 636: Performed by: HOSPITALIST

## 2022-01-18 PROCEDURE — 36415 COLL VENOUS BLD VENIPUNCTURE: CPT | Performed by: EMERGENCY MEDICINE

## 2022-01-18 PROCEDURE — 36415 COLL VENOUS BLD VENIPUNCTURE: CPT | Performed by: HOSPITALIST

## 2022-01-18 PROCEDURE — 250N000011 HC RX IP 250 OP 636: Performed by: EMERGENCY MEDICINE

## 2022-01-18 PROCEDURE — 71045 X-RAY EXAM CHEST 1 VIEW: CPT

## 2022-01-18 PROCEDURE — 86140 C-REACTIVE PROTEIN: CPT | Performed by: EMERGENCY MEDICINE

## 2022-01-18 PROCEDURE — 83605 ASSAY OF LACTIC ACID: CPT | Performed by: EMERGENCY MEDICINE

## 2022-01-18 PROCEDURE — XW033E5 INTRODUCTION OF REMDESIVIR ANTI-INFECTIVE INTO PERIPHERAL VEIN, PERCUTANEOUS APPROACH, NEW TECHNOLOGY GROUP 5: ICD-10-PCS | Performed by: HOSPITALIST

## 2022-01-18 PROCEDURE — 80164 ASSAY DIPROPYLACETIC ACD TOT: CPT | Performed by: EMERGENCY MEDICINE

## 2022-01-18 PROCEDURE — 96374 THER/PROPH/DIAG INJ IV PUSH: CPT

## 2022-01-18 PROCEDURE — 250N000009 HC RX 250: Performed by: HOSPITALIST

## 2022-01-18 PROCEDURE — 71275 CT ANGIOGRAPHY CHEST: CPT

## 2022-01-18 PROCEDURE — C9803 HOPD COVID-19 SPEC COLLECT: HCPCS

## 2022-01-18 PROCEDURE — 120N000001 HC R&B MED SURG/OB

## 2022-01-18 PROCEDURE — 250N000013 HC RX MED GY IP 250 OP 250 PS 637: Performed by: HOSPITALIST

## 2022-01-18 PROCEDURE — 85025 COMPLETE CBC W/AUTO DIFF WBC: CPT | Performed by: EMERGENCY MEDICINE

## 2022-01-18 PROCEDURE — 258N000003 HC RX IP 258 OP 636: Performed by: HOSPITALIST

## 2022-01-18 PROCEDURE — 84145 PROCALCITONIN (PCT): CPT | Performed by: HOSPITALIST

## 2022-01-18 PROCEDURE — 93005 ELECTROCARDIOGRAM TRACING: CPT | Performed by: EMERGENCY MEDICINE

## 2022-01-18 PROCEDURE — 80053 COMPREHEN METABOLIC PANEL: CPT | Performed by: EMERGENCY MEDICINE

## 2022-01-18 RX ORDER — DIVALPROEX SODIUM 500 MG/1
1000 TABLET, DELAYED RELEASE ORAL 3 TIMES DAILY
Status: DISCONTINUED | OUTPATIENT
Start: 2022-01-18 | End: 2022-01-19 | Stop reason: HOSPADM

## 2022-01-18 RX ORDER — SENNOSIDES 8.6 MG
1 TABLET ORAL 2 TIMES DAILY
Status: DISCONTINUED | OUTPATIENT
Start: 2022-01-18 | End: 2022-01-19 | Stop reason: HOSPADM

## 2022-01-18 RX ORDER — LEVOCARNITINE 330 MG/1
330 TABLET ORAL 3 TIMES DAILY
Status: DISCONTINUED | OUTPATIENT
Start: 2022-01-18 | End: 2022-01-19 | Stop reason: HOSPADM

## 2022-01-18 RX ORDER — CLONAZEPAM 0.5 MG/1
0.5 TABLET ORAL AT BEDTIME
Status: DISCONTINUED | OUTPATIENT
Start: 2022-01-18 | End: 2022-01-19 | Stop reason: HOSPADM

## 2022-01-18 RX ORDER — ESOMEPRAZOLE MAGNESIUM 40 MG/1
20 CAPSULE, DELAYED RELEASE ORAL DAILY
COMMUNITY

## 2022-01-18 RX ORDER — MIRTAZAPINE 30 MG/1
30 TABLET, FILM COATED ORAL AT BEDTIME
COMMUNITY
End: 2022-03-31

## 2022-01-18 RX ORDER — LIDOCAINE 40 MG/G
CREAM TOPICAL
Status: DISCONTINUED | OUTPATIENT
Start: 2022-01-18 | End: 2022-01-19 | Stop reason: HOSPADM

## 2022-01-18 RX ORDER — ARIPIPRAZOLE 5 MG/1
5 TABLET ORAL AT BEDTIME
Status: DISCONTINUED | OUTPATIENT
Start: 2022-01-18 | End: 2022-01-19 | Stop reason: HOSPADM

## 2022-01-18 RX ORDER — LORAZEPAM 2 MG/ML
1 INJECTION INTRAMUSCULAR ONCE
Status: COMPLETED | OUTPATIENT
Start: 2022-01-18 | End: 2022-01-18

## 2022-01-18 RX ORDER — MIRTAZAPINE 30 MG/1
30 TABLET, FILM COATED ORAL AT BEDTIME
Status: DISCONTINUED | OUTPATIENT
Start: 2022-01-18 | End: 2022-01-19 | Stop reason: HOSPADM

## 2022-01-18 RX ORDER — CYCLOSPORINE 0.5 MG/ML
1 EMULSION OPHTHALMIC EVERY 12 HOURS
Status: DISCONTINUED | OUTPATIENT
Start: 2022-01-18 | End: 2022-01-19 | Stop reason: HOSPADM

## 2022-01-18 RX ORDER — ARIPIPRAZOLE 5 MG/1
5 TABLET ORAL AT BEDTIME
COMMUNITY
End: 2023-09-05

## 2022-01-18 RX ORDER — CIPROFLOXACIN AND DEXAMETHASONE 3; 1 MG/ML; MG/ML
4 SUSPENSION/ DROPS AURICULAR (OTIC) 2 TIMES DAILY
Status: DISCONTINUED | OUTPATIENT
Start: 2022-01-18 | End: 2022-01-19 | Stop reason: HOSPADM

## 2022-01-18 RX ORDER — DOCUSATE SODIUM 100 MG/1
200 CAPSULE, LIQUID FILLED ORAL 2 TIMES DAILY
Status: DISCONTINUED | OUTPATIENT
Start: 2022-01-18 | End: 2022-01-19 | Stop reason: HOSPADM

## 2022-01-18 RX ORDER — DEXAMETHASONE SODIUM PHOSPHATE 10 MG/ML
6 INJECTION, SOLUTION INTRAMUSCULAR; INTRAVENOUS ONCE
Status: COMPLETED | OUTPATIENT
Start: 2022-01-18 | End: 2022-01-18

## 2022-01-18 RX ORDER — CARBAMAZEPINE 200 MG/1
200 TABLET ORAL 2 TIMES DAILY
Status: DISCONTINUED | OUTPATIENT
Start: 2022-01-18 | End: 2022-01-19 | Stop reason: HOSPADM

## 2022-01-18 RX ORDER — PANTOPRAZOLE SODIUM 20 MG/1
40 TABLET, DELAYED RELEASE ORAL 2 TIMES DAILY
Status: DISCONTINUED | OUTPATIENT
Start: 2022-01-18 | End: 2022-01-19 | Stop reason: HOSPADM

## 2022-01-18 RX ORDER — DEXAMETHASONE SODIUM PHOSPHATE 10 MG/ML
6 INJECTION, SOLUTION INTRAMUSCULAR; INTRAVENOUS ONCE
Status: DISCONTINUED | OUTPATIENT
Start: 2022-01-18 | End: 2022-01-18

## 2022-01-18 RX ORDER — DEXAMETHASONE SODIUM PHOSPHATE 4 MG/ML
6 INJECTION, SOLUTION INTRA-ARTICULAR; INTRALESIONAL; INTRAMUSCULAR; INTRAVENOUS; SOFT TISSUE DAILY
Status: DISCONTINUED | OUTPATIENT
Start: 2022-01-19 | End: 2022-01-19 | Stop reason: HOSPADM

## 2022-01-18 RX ORDER — ZOLPIDEM TARTRATE 5 MG/1
10 TABLET ORAL AT BEDTIME
Status: DISCONTINUED | OUTPATIENT
Start: 2022-01-18 | End: 2022-01-19 | Stop reason: HOSPADM

## 2022-01-18 RX ORDER — IBUPROFEN 200 MG
800 TABLET ORAL EVERY 8 HOURS PRN
COMMUNITY

## 2022-01-18 RX ORDER — IOPAMIDOL 755 MG/ML
100 INJECTION, SOLUTION INTRAVASCULAR ONCE
Status: COMPLETED | OUTPATIENT
Start: 2022-01-18 | End: 2022-01-18

## 2022-01-18 RX ADMIN — DOCUSATE SODIUM 200 MG: 100 CAPSULE, LIQUID FILLED ORAL at 20:52

## 2022-01-18 RX ADMIN — ENOXAPARIN SODIUM 40 MG: 40 INJECTION SUBCUTANEOUS at 10:05

## 2022-01-18 RX ADMIN — CALCIUM CARBONATE-VITAMIN D TAB 500 MG-200 UNIT 1 TABLET: 500-200 TAB at 20:53

## 2022-01-18 RX ADMIN — LORAZEPAM 1 MG: 2 INJECTION INTRAMUSCULAR; INTRAVENOUS at 13:29

## 2022-01-18 RX ADMIN — SENNOSIDES 1 TABLET: 8.6 TABLET ORAL at 10:08

## 2022-01-18 RX ADMIN — CITALOPRAM HYDROBROMIDE 60 MG: 40 TABLET ORAL at 10:07

## 2022-01-18 RX ADMIN — ARIPIPRAZOLE 5 MG: 5 TABLET ORAL at 21:08

## 2022-01-18 RX ADMIN — DIVALPROEX SODIUM 1000 MG: 500 TABLET, DELAYED RELEASE ORAL at 15:40

## 2022-01-18 RX ADMIN — LEVOCARNITINE 330 MG: 330 TABLET ORAL at 20:53

## 2022-01-18 RX ADMIN — PANTOPRAZOLE SODIUM 40 MG: 20 TABLET, DELAYED RELEASE ORAL at 15:46

## 2022-01-18 RX ADMIN — CYCLOSPORINE 1 DROP: 0.5 EMULSION OPHTHALMIC at 10:16

## 2022-01-18 RX ADMIN — ZOLPIDEM TARTRATE 10 MG: 5 TABLET ORAL at 21:09

## 2022-01-18 RX ADMIN — MIRTAZAPINE 30 MG: 30 TABLET ORAL at 21:08

## 2022-01-18 RX ADMIN — CARBAMAZEPINE 200 MG: 200 TABLET ORAL at 10:12

## 2022-01-18 RX ADMIN — DOCUSATE SODIUM 200 MG: 100 CAPSULE, LIQUID FILLED ORAL at 10:07

## 2022-01-18 RX ADMIN — CALCIUM CARBONATE-VITAMIN D TAB 500 MG-200 UNIT 1 TABLET: 500-200 TAB at 10:07

## 2022-01-18 RX ADMIN — CYCLOSPORINE 1 DROP: 0.5 EMULSION OPHTHALMIC at 20:07

## 2022-01-18 RX ADMIN — PANTOPRAZOLE SODIUM 40 MG: 20 TABLET, DELAYED RELEASE ORAL at 10:12

## 2022-01-18 RX ADMIN — SENNOSIDES 1 TABLET: 8.6 TABLET ORAL at 20:53

## 2022-01-18 RX ADMIN — SODIUM CHLORIDE 50 ML: 9 INJECTION, SOLUTION INTRAVENOUS at 11:52

## 2022-01-18 RX ADMIN — DEXAMETHASONE SODIUM PHOSPHATE 6 MG: 10 INJECTION, SOLUTION INTRAMUSCULAR; INTRAVENOUS at 06:23

## 2022-01-18 RX ADMIN — LEVOCARNITINE 330 MG: 330 TABLET ORAL at 15:44

## 2022-01-18 RX ADMIN — IOPAMIDOL 100 ML: 755 INJECTION, SOLUTION INTRAVENOUS at 14:05

## 2022-01-18 RX ADMIN — DIVALPROEX SODIUM 1000 MG: 500 TABLET, DELAYED RELEASE ORAL at 20:54

## 2022-01-18 RX ADMIN — CARBAMAZEPINE 300 MG: 200 TABLET ORAL at 21:08

## 2022-01-18 RX ADMIN — REMDESIVIR 200 MG: 100 INJECTION, POWDER, LYOPHILIZED, FOR SOLUTION INTRAVENOUS at 11:11

## 2022-01-18 RX ADMIN — CIPROFLOXACIN AND DEXAMETHASONE 4 DROP: 3; 1 SUSPENSION/ DROPS AURICULAR (OTIC) at 20:05

## 2022-01-18 RX ADMIN — DIVALPROEX SODIUM 1000 MG: 500 TABLET, DELAYED RELEASE ORAL at 10:06

## 2022-01-18 RX ADMIN — LEVOCARNITINE 330 MG: 330 TABLET ORAL at 10:07

## 2022-01-18 RX ADMIN — CIPROFLOXACIN AND DEXAMETHASONE 4 DROP: 3; 1 SUSPENSION/ DROPS AURICULAR (OTIC) at 10:17

## 2022-01-18 RX ADMIN — CARBAMAZEPINE 200 MG: 200 TABLET ORAL at 15:41

## 2022-01-18 ASSESSMENT — ACTIVITIES OF DAILY LIVING (ADL)
ADLS_ACUITY_SCORE: 5.75
ADLS_ACUITY_SCORE: 4.75
ADLS_ACUITY_SCORE: 5.75
ADLS_ACUITY_SCORE: 4.75
ADLS_ACUITY_SCORE: 5.75
ADLS_ACUITY_SCORE: 4.75
ADLS_ACUITY_SCORE: 5.75
ADLS_ACUITY_SCORE: 5.75
ADLS_ACUITY_SCORE: 4.75
ADLS_ACUITY_SCORE: 4.75

## 2022-01-18 NOTE — ED NOTES
Jordon Suarez called for update. Updated on need for straight cath and plan of care. Daniela requested ipad login information. Instructions and codes provided to her.

## 2022-01-18 NOTE — ED NOTES
Niece states that patient has curvature of spine so laying flat on back is painful for patient. Patient prefers to lay on right side and typically has head hanging off bed.

## 2022-01-18 NOTE — ED NOTES
Group Home staff at bedside.Attempted to apply nasal cannula for home O2.Patient immediately removes cannula.Group Home staff state patient has had to be restrained for any medical interventions in past.Dr Casillas notified and spoke with staff.Patient will be admitted with soft restraints to administer O2 and IV meds.Niece, who is legal guardian , notified by group home staff of plan of care and agreeable.

## 2022-01-18 NOTE — ED PROVIDER NOTES
EMERGENCY DEPARTMENT ENCOUnter      NAME: Michell Mccann  AGE: 61 year old female  YOB: 1960  MRN: [id number]  EVALUATION DATE & TIME: 1/18/2022  3:46 AM    PCP: Deion Muniz    ED PROVIDER: Ana Casillas MD      Chief Complaint   Patient presents with     Cough         FINAL IMPRESSION:  1. Hypoxemia    2. Infection due to 2019 novel coronavirus          ED COURSE & MEDICAL DECISION MAKING:      In summary, the patient is a 61-year-old female that presents to the emergency department with a cough and hypoxemia thought secondary to COVID-19 pneumonia.  She is requiring 2 L of nasal cannula oxygen to maintain her oxygen saturations above 90%.  Due to her profound developmental delay, she will not keep the oxygen on her face and home oxygen therapy is not an option.  We will admit to the hospital for further care of her COVID-19 pneumonia.  4:00 AM I performed my initial history and physical exam as well as discussed ED course and plan.    0515-Will not keep oxygen on face.  Group home unable to do restraints, we will place patient in soft restraints to keep oxygen in place.  Decadron 6 mg IV was administered.  [facility], and he accepts patient for admission.      At the conclusion of the encounter I discussed the results of all of the tests and the disposition. The questions were answered. The patient or family acknowledged understanding and was agreeable with the care plan.         MEDICATIONS GIVEN IN THE EMERGENCY:  Medications   dexamethasone PF (DECADRON) injection 6 mg (has no administration in time range)   dexamethasone PF (DECADRON) injection 6 mg (has no administration in time range)       NEW PRESCRIPTIONS STARTED AT TODAY'S ER VISIT  New Prescriptions    No medications on file          =================================================================    HPI    History Limited by Patient's mental retardation    Information  from: Group Home representative  Michell Mccann is a 61 year old female with a pertinent history of hypoxia, parapneumonic effusion, Sjogren Syndrome, anemia, and severe mental retardaton  who presents to this ED via private car for evaluation of Covid symptoms.     Per Group Home representative, patient developed a cough and shortness of breath following a known exposure to a Covid positive living partner approximately a week ago. Her oxygen was remarked to drop down into the mid 80's.  Patient denies vomiting, diarrhea, or any other symptoms at this time.     REVIEW OF SYSTEMS   ROS Limited by Patient's mental retardation  Constitutional:  Denies fever or chills  HENT:  Denies sore throat   Respiratory:  Pos for cough an shortness of breath  Cardiovascular:  Denies chest pain or palpitations  GI:  Denies abdominal pain, nausea, or vomiting  Musculoskeletal:  Denies any new extremity pain   Skin:  Denies rash   Neurologic:  Denies headache, focal weakness or sensory changes          PAST MEDICAL HISTORY:  Past Medical History:   Diagnosis Date     Dislocation, shoulder     Chronic Right shoulder dislocation     Intermittent explosive disorder      Kyphosis dorsalis juvenilis      Osteopenia      Parapneumonic effusion     from PNA 1/2015     Profound mental retardation      Seizure disorder (H)      Seizure disorder (H)        PAST SURGICAL HISTORY:  Past Surgical History:   Procedure Laterality Date     EXAM UNDER ANESTHESIA, RESTORATIONS, EXTRACTION(S) DENTAL COMPLEX, COMBINED       EXAM UNDER ANESTHESIA, RESTORATIONS, EXTRACTION(S) DENTAL COMPLEX, COMBINED N/A 5/13/2016    Procedure: COMBINED EXAM UNDER ANESTHESIA, RESTORATIONS, EXTRACTION(S) DENTAL COMPLEX;  Surgeon: Scarlet Aragon DDS;  Location:  OR      MIDLINE INSERTION  1/1/2015          THORACOSCOPY Right 12/28/2014    Procedure: ULTRASOUND GUIDED RIGHT THORACENTESIS;  Surgeon: Antonio Saunders MD;  [place] [facility] [place]:             CURRENT MEDICATIONS:    ACETAMINOPHEN PO  calcium-vitamin D (CALTRATE) 600-400 MG-UNIT per tablet  CarBAMazepine (TEGRETOL PO)  CARBAMAZEPINE PO  CARNITOR 330 MG OR TABS  CHLORHEXIDINE GLUCONATE 0.12 % MT SOLN  ciprofloxacin-dexamethasone (CIPRODEX) otic suspension  citalopram (CELEXA) 40 MG tablet  clonazePAM (KLONOPIN) 1 MG tablet  COLACE 100 MG OR CAPS  cycloSPORINE (RESTASIS) 0.05 % ophthalmic emulsion  DEPAKOTE 500 MG OR TBEC  diphenhydrAMINE (BENADRYL) 25 MG tablet  Esomeprazole Magnesium (NEXIUM PO)  MELATONIN PO  MILK OF MAGNESIA 400 MG/5ML OR SUSP  Multiple Vitamin (TAB-A-TENZIN PO)  RisperiDONE (RISPERDAL PO)  sennosides (SENOKOT) 8.6 MG tablet  sodium fluoride dental gel (PREVIDENT) 1.1 % GEL  zolpidem (AMBIEN) 10 MG tablet  ZOLPIDEM TARTRATE PO        ALLERGIES:  Allergies   Allergen Reactions     Drixoral [Bromfed]      Sudafed [Pseudoephedrine Hcl]        FAMILY HISTORY:  No family history on file.    SOCIAL HISTORY:   Social History     Socioeconomic History     Marital status: Single     Spouse name: Not on file     Number of children: Not on file     Years of education: Not on file     Highest education level: Not on file   Occupational History     Not on file   Tobacco Use     Smoking status: Never Smoker     Smokeless tobacco: Never Used   Substance and Sexual Activity     Alcohol use: No     Drug use: No     Sexual activity: Never   Other Topics Concern     Not on file   Social History Narrative    The patient is non-verbal and lives at a group home.     Social Determinants of Health     Financial Resource Strain: Not on file   Food Insecurity: Not on file   Transportation Needs: Not on file   Physical Activity: Not on file   Stress: Not on file   Social Connections: Not on file   Intimate Partner Violence: Not on file   Housing Stability: Not on file       VITALS:  Patient Vitals for the past 24 hrs:   BP Temp Temp src Pulse Resp SpO2 Weight   01/18/22 0440 -- -- -- 81 -- (!) 88 % --    01/18/22 0430 -- -- -- 81 -- (!) 88 % --   01/18/22 0420 -- -- -- 87 -- 92 % --   01/18/22 0416 130/61 97.2  F (36.2  C) Axillary 89 20 91 % 63.5 kg (140 lb)   01/18/22 0405 -- -- -- 86 -- (!) 89 % --   01/18/22 0400 130/61 -- -- 84 -- 91 % --       PHYSICAL EXAM    Constitutional:  Well developed, Well nourished,  HENT:  Normocephalic, Atraumatic, Bilateral external ears normal, Oropharynx moist, Nose normal.   Neck:  Normal range of motion, No meningismus, No stridor.   Eyes:  EOMI, Conjunctiva normal, No discharge.   Respiratory:  Normal breath sounds, No respiratory distress, No wheezing, No chest tenderness.   Cardiovascular:  Normal heart rate, Normal rhythm, No murmurs  GI:  Soft, No tenderness, No guarding, No CVA tenderness.   Musculoskeletal:  Neurovascularly intact distally, No edema, No tenderness, No cyanosis, Good range of motion in all major joints. No tenderness to palpation or major deformities noted.   Integument:  Warm, Dry, No erythema, No rash.   Lymphatic:  No lymphadenopathy noted.   Neurologic:  Alert , Normal motor function,  No focal deficits noted.   Psychiatric:  Affect normal,  Mood normal.      LAB:  All pertinent labs reviewed and interpreted.  Results for orders placed or performed during the hospital encounter of 01/18/22   XR Chest Port 1 View    Impression    IMPRESSION: Hazy ill-defined left perihilar and left lower lung infiltrates concerning for possible pneumonia. Minor linear scarring involving the right lower lung unchanged. Normal heart size and pulmonary vascularity. Degenerative changes in the spine.   Symptomatic; Unknown Influenza A/B & SARS-CoV2 (COVID-19) Virus PCR Multiplex Nose    Specimen: Nose; Swab   Result Value Ref Range    Influenza A PCR Negative Negative    Influenza B PCR Negative Negative    SARS CoV2 PCR Positive (A) Negative       RADIOLOGY:  I have independently reviewed and interpreted the above imaging, pending the final radiology read.  XR Chest  Port 1 View   Final Result   IMPRESSION: Hazy ill-defined left perihilar and left lower lung infiltrates concerning for possible pneumonia. Minor linear scarring involving the right lower lung unchanged. Normal heart size and pulmonary vascularity. Degenerative changes in the spine.                  I, Papo Roblero, am serving as a scribe to document services personally performed by Dr. Casillas based on my observation and the provider's statements to me. I, Ana Casillas MD attest that Papo Roblero is acting in a scribe capacity, has observed my performance of the services and has documented them in accordance with my direction.    Ana Casillas MD  Emergency Medicine  [facility] EMERGENCY DEPARTMENT  [facility] [facility] [id number] [phone]  Dept: [phone]       Ana Casillas MD  01/18/22 0581

## 2022-01-18 NOTE — ED NOTES
"Spoke to niece, Daniela, states that patient eats regular texture diet with thin liquids.    States patient requires help with set up and attendance with eating. Patient unable to cut up her own food but can feed herself, however, \"shovels in food until she chokes,\" so needs someone there to provided frequent reminders to take her time, pause for sips of fluids, or sometimes place hand on arm to stop her from eating too much at a time.     Takes pills whole. Can be uncooperative at times. Prefers Gatorade or juice. Staff typically tips meds from cup into mouth. If uncooperative with this route, can try with pudding or jello.   "

## 2022-01-18 NOTE — ED NOTES
Bladder scan completed by tech showed 625 mL in bladder. MD notified and placed order for straight cath. Tech completed straight cath and drained 600 mL urine output.

## 2022-01-18 NOTE — PROGRESS NOTES
Clinic Care Coordination Contact    Care Coordination ED Discharge Follow up Note    Patient referred for Virtual Home Monitoring Program for COVID-19 following recent [facility] ED visit.    RN has confirmed a [facility] referral is in place.    Criteria for Virtual Home Monitoring telephone outreach is not met after review of ED encounter/ED provider note because:    1) Patient was not discharged; disposition changed and patient was admitted to hospital.    Anna Strange RN  [facility]  - [facility] [employer] Care Coordinator

## 2022-01-18 NOTE — ED NOTES
Pt's is been fed for breakfast, ate 100 % of her meal. Staffs changed pt's brief and pur wick is been placed. Pt's is been repositioned. Pt's is now comfortably resting and a bit agitated every once in awhile by kicking and pulling her arm restraints off. Staff is watching the patients constantly and doing one on one.

## 2022-01-18 NOTE — TELEPHONE ENCOUNTER
Almost certain she has COVID    Very symptomatic   Slept for several hours and then woke up Struggling to breathe   O2 sats at 85    Triaged to a disposition of Call . Caregiver is agreeable    Reason for Disposition    SEVERE difficulty breathing (e.g., struggling for each breath, speaks in single words)    Protocols used: CORONAVIRUS (COVID-19) DIAGNOSED OR [id number] 8.25.2021    Geneva Ying RN on 1/18/2022 at 2:33 AM

## 2022-01-18 NOTE — ED NOTES
"Patient took all medications with breakfast (jello and oatmeal). Spit meds out when given without. Tech assisted with eye drop and ear drop administration as patient was not cooperative.    Patient was pleasant with meal. Stating \"good.\"    Removed restraints for ROM and patient removed nasal canula. Both were replaced.  "

## 2022-01-18 NOTE — H&P
[facility]    History and Physical - Hospitalist Service       Date of Admission:  1/18/2022    Assessment & Plan      Michell Mccann is a 61 year old female admitted on 1/18/2022. She has history of profound developmental delay, constipation, GERD, insomnia, seizure d/o who had an exposure to a COVID-positive living partner at her group home and was sent in for concerns of symptoms for COVID-pneumonia and tested positive on 1/18/22    COVID-19 pneumonia  Acute hypoxic respiratory failure  -Onset of symptoms on 1 week PTA, tested positive on 1/18/22.  -CXR shows findings s/o of COVID pneumonia  -Dexamethasone and remdesivir started on 1/18/22  -Monitor on continuous pulse ox, keep saturations greater than 94%.  -Currently requiring o2 3L by NC, needing soft limb restraints to keep o2 on.  -Pro calcitonin negative, no indication for abx.  -Reviewed labs which are significant for elevated LDH, CRP and D-dimer.Obtain CT chest to r/o PE.  -Lovenox ordered for DVT ppx     Mood disorder-continue PTA Abilify, Celexa, Remeron, Klonopin at bedtime    Insomnia-resume PTA Ambien, melatonin    History of seizure disorder-continue PTA Tegretol, Depakote (both levels in therapeutic range on admission)    GERD: On PTA Nexium    Constipation: On bowel regimen      Diet: Combination Diet Regular Diet Adult    DVT Prophylaxis: Enoxaparin (Lovenox) SQ  Pelaez Catheter: Not present  Central Lines: None  Code Status: Full code, discussed with her niece Daniela on admission.    Disposition Plan   Expected Discharge: 2-3 days  Anticipated discharge location:  Awaiting care coordination huddle  Delays: Hypoxia, clinical improvement       The patient's care was discussed with the Bedside Nurse.    Zee Mello MD  [facility]  ______________________________________________________________________    Chief Complaint   Cough    History is obtained from the chart review    History of Present  Illness   Michell Mccann is a 61 year old female with history of severe developmental delay and mental retardation, nonverbal who is a resident of a group home was sent due to concerns of COVID-19 pneumonia.  Patient nonverbal, unable to obtain any history from the patient.  History obtained from chart review. Tried reaching Sherrell at thr group home , no answer. Talked to Niariela Suarez and according to her group home  was concerned because patient had a cough and was found to be hypoxic and hence sent in for further evaluation to [facility].  Patient had a known exposure to COVID-positive living partner about a week ago.  Patient was noted to be hypoxic with saturations in mid 80s and hence sent in here for further evaluation.    On arrival to ER, patient is hemodynamically stable except for tachypneic.  Labs fairly unremarkable except for elevated CRP, LDH and D-dimer.CXR findings s/o COVID 19 pneumonia    Review of Systems    Limited given pt has severe developmental delay and nonverbal    Past Medical History    I have reviewed this patient's medical history and updated it with pertinent information if needed.   Past Medical History:   Diagnosis Date     Dislocation, shoulder     Chronic Right shoulder dislocation     Intermittent explosive disorder      Kyphosis dorsalis juvenilis      Osteopenia      Parapneumonic effusion     from PNA 1/2015     Profound mental retardation      Seizure disorder (H)      Seizure disorder (H)        Past Surgical History   I have reviewed this patient's surgical history and updated it with pertinent information if needed.  Past Surgical History:   Procedure Laterality Date     EXAM UNDER ANESTHESIA, RESTORATIONS, EXTRACTION(S) DENTAL COMPLEX, COMBINED       EXAM UNDER ANESTHESIA, RESTORATIONS, EXTRACTION(S) DENTAL COMPLEX, COMBINED N/A 5/13/2016    Procedure: COMBINED EXAM UNDER ANESTHESIA, RESTORATIONS, EXTRACTION(S) DENTAL COMPLEX;  Surgeon: Scarlet Aragon DDS;   Location: [facility] OR     HH MIDLINE INSERTION  1/1/2015          THORACOSCOPY Right 12/28/2014    Procedure: ULTRASOUND GUIDED RIGHT THORACENTESIS;  Surgeon: Antonio Saunders MD;  Location: [facility];  Service:        Social History   I have reviewed this patient's social history and updated it with pertinent information if needed.  Social History     Tobacco Use     Smoking status: Never Smoker     Smokeless tobacco: Never Used   Substance Use Topics     Alcohol use: No     Drug use: No       Family History   Unable to obtain due to: given pt unable to communicate    Prior to Admission Medications   Prior to Admission Medications   Prescriptions Last Dose Informant Patient Reported? Taking?   ARIPiprazole (ABILIFY) 5 MG tablet 1/17/2022 at pm  Yes Yes   Sig: Take 5 mg by mouth At Bedtime   CARNITOR 330 MG OR TABS 1/17/2022 at pm Care Giver Yes Yes   Sig: Take 330 mg by mouth 3 times daily    CHLORHEXIDINE GLUCONATE 0.12 % MT SOLN 1/17/2022 at am Care Giver Yes Yes   Sig: SWABBED TO TEETH DAILY   COLACE 100 MG OR CAPS 1/17/2022 at pm Care Giver Yes Yes   Sig: Take 200 mg by mouth 2 times daily    DEPAKOTE 500 MG OR TBEC 1/17/2022 at pm Care Giver Yes Yes   Sig: Take 1,000 mg by mouth 3 times daily    Multiple Vitamin (TAB-A-TENZIN PO) 1/17/2022 at am Care Giver Yes Yes   Sig: Take 1 tablet by mouth daily.   calcium-vitamin D (CALTRATE) 600-400 MG-UNIT per tablet 1/17/2022 at pm Care Giver No Yes   Sig: Take 1 tablet by mouth 2 times daily. This replaces the old calcium prescription   carBAMazepine (TEGRETOL) 200 MG tablet 1/17/2022 at 1500 Care Giver Yes Yes   Sig: Take 200 mg by mouth 2 times daily Morning and at 3pm   carBAMazepine (TEGRETOL) 200 MG tablet 1/17/2022 at pm Care Giver Yes Yes   Sig: Take 300 mg by mouth At Bedtime    ciprofloxacin-dexamethasone (CIPRODEX) otic suspension 1/17/2022 at pm Care Giver Yes Yes   Sig: Place 4 drops Into the left ear 2 times daily    citalopram (CELEXA) 40 MG tablet  1/17/2022 at am Care Giver Yes Yes   Sig: Take 60 mg by mouth daily 1 and 1/2 tablets daily   clonazePAM (KLONOPIN) 1 MG tablet 1/17/2022 at pm  No Yes   Sig: TAKE 1/2 TABLET (0.5MG) BY MOUTH AT BEDTIME. *6 TOTAL FILLS*   cycloSPORINE (RESTASIS) 0.05 % ophthalmic emulsion 1/17/2022 at pm Care Giver Yes Yes   Sig: Place 1 drop into both eyes every 12 hours.   diphenhydrAMINE (BENADRYL) 25 MG tablet  at prn Care Giver No Yes   Sig: Give 50 mg (2 capsules) 1 hour before Bactrim doses.   Patient taking differently: Take 25 mg by mouth nightly as needed    esomeprazole (NEXIUM) 20 MG DR capsule 1/17/2022 at am Care Giver Yes Yes   Sig: Take 40 mg by mouth every morning (before breakfast)    esomeprazole (NEXIUM) 40 MG DR capsule 1/17/2022 at 1500  Yes Yes   Sig: Take 20 mg by mouth daily In the afternoon   ibuprofen (ADVIL/MOTRIN) 200 MG tablet Past Week at Unknown time  Yes Yes   Sig: Take 800 mg by mouth every 8 hours as needed for mild pain   melatonin 5 MG tablet 1/17/2022 at pm  Yes Yes   Sig: Take 10 mg by mouth At Bedtime   mirtazapine (REMERON) 30 MG tablet 1/17/2022 at pm  Yes Yes   Sig: Take 30 mg by mouth At Bedtime   sennosides (SENOKOT) 8.6 MG tablet 1/18/2022 at pm Care Giver Yes Yes   Sig: Take 1 tablet by mouth 2 times daily   sodium fluoride dental gel (PREVIDENT) 1.1 % GEL 1/17/2022 at am  No Yes   Sig: Take by mouth At Bedtime Brush teeth with a pea sized amount for 60 seconds once daily.   zolpidem (AMBIEN) 10 MG tablet 1/17/2022 at pm  No Yes   Sig: TAKE 1 TABLET BY MOUTH AT BEDTIME *6 TOTAL FILLS*      Facility-Administered Medications: None     Allergies   Allergies   Allergen Reactions     Drixoral [Bromfed]      Sudafed [Pseudoephedrine Hcl]        Physical Exam   Vital Signs: Temp: 97.2  F (36.2  C) Temp src: Axillary BP: 130/61 Pulse: 80   Resp: (!) 50 SpO2: 97 % O2 Device: None (Room air)    Weight: 140 lbs 0 oz  General:  Appears stated age, no acute distress. A&O x 3.  Skin:  Warm, dry. No  rashes or lesions on exposed skin.  HEENT:  Normocephalic, atraumatic; EOMs grossly intact.  Neck:  Supple.  Chest:  Breath sounds CTA and no increased work of breathing on room air.  Cardiovascular:  RRR, no rub or murmur. No peripheral edema.  Abdomen:  Soft, non-tender, non-distended.  Musculoskeletal:  Moves all four extremities. No muscle atrophy.  Neurological:  CN 2-12 grossly intact.    Data   Data reviewed today: I reviewed all medications, new labs and imaging results over the last 24 hours.  I personally reviewed labs, CXR    Recent Labs   Lab 01/18/22  0543   WBC 10.6   HGB 11.3*   *      INR 1.15      POTASSIUM 4.4   CHLORIDE 105   CO2 24   BUN 22   CR 0.60   ANIONGAP 12   PADDY 8.5   GLC 76   ALBUMIN 2.4*   PROTTOTAL 6.8   BILITOTAL 0.3   ALKPHOS 93   ALT 13   AST 32       Imaging:  Recent Results (from the past 24 hour(s))   XR Chest Port 1 View    Narrative    EXAM: XR CHEST PORT 1 VIEW  LOCATION: [facility]  DATE/TIME: 1/18/2022 4:13 AM    INDICATION: Cough and shortness of breath  COMPARISON: 01/23/2019      Impression    IMPRESSION: Hazy ill-defined left perihilar and left lower lung infiltrates concerning for possible pneumonia. Minor linear scarring involving the right lower lung unchanged. Normal heart size and pulmonary vascularity. Degenerative changes in the spine.

## 2022-01-18 NOTE — ED TRIAGE NOTES
Arrives from [facility].EMS and staff report that other residents have tested positive for Covid.Patient now with cough.Guardian did not want patient routine tested without sx but agreeable to testing at this time.Patient is alert.Resp even and unlabored.Nonverbal at baseline.Skin pink,W/D.

## 2022-01-18 NOTE — PHARMACY-ADMISSION MEDICATION HISTORY
Pharmacy Note - Admission Medication History    Pertinent Provider Information: none     ______________________________________________________________________    Prior To Admission (PTA) med list completed and updated in EMR.       PTA Med List   Medication Sig Last Dose     ARIPiprazole (ABILIFY) 5 MG tablet Take 5 mg by mouth At Bedtime 1/17/2022 at pm     calcium-vitamin D (CALTRATE) 600-400 MG-UNIT per tablet Take 1 tablet by mouth 2 times daily. This replaces the old calcium prescription 1/17/2022 at pm     carBAMazepine (TEGRETOL) 200 MG tablet Take 300 mg by mouth At Bedtime  1/17/2022 at pm     carBAMazepine (TEGRETOL) 200 MG tablet Take 200 mg by mouth 2 times daily Morning and at 3pm 1/17/2022 at 1500     CARNITOR 330 MG OR TABS Take 330 mg by mouth 3 times daily  1/17/2022 at pm     CHLORHEXIDINE GLUCONATE 0.12 % MT SOLN SWABBED TO TEETH DAILY 1/17/2022 at am     ciprofloxacin-dexamethasone (CIPRODEX) otic suspension Place 4 drops Into the left ear 2 times daily  1/17/2022 at pm     citalopram (CELEXA) 40 MG tablet Take 60 mg by mouth daily 1 and 1/2 tablets daily 1/17/2022 at am     clonazePAM (KLONOPIN) 1 MG tablet TAKE 1/2 TABLET (0.5MG) BY MOUTH AT BEDTIME. *6 TOTAL FILLS* 1/17/2022 at pm     COLACE 100 MG OR CAPS Take 200 mg by mouth 2 times daily  1/17/2022 at pm     cycloSPORINE (RESTASIS) 0.05 % ophthalmic emulsion Place 1 drop into both eyes every 12 hours. 1/17/2022 at pm     DEPAKOTE 500 MG OR TBEC Take 1,000 mg by mouth 3 times daily  1/17/2022 at pm     diphenhydrAMINE (BENADRYL) 25 MG tablet Give 50 mg (2 capsules) 1 hour before Bactrim doses. (Patient taking differently: Take 25 mg by mouth nightly as needed )  at prn     esomeprazole (NEXIUM) 20 MG DR capsule Take 40 mg by mouth every morning (before breakfast)  1/17/2022 at am     esomeprazole (NEXIUM) 40 MG DR capsule Take 20 mg by mouth daily In the afternoon 1/17/2022 at 1500     ibuprofen (ADVIL/MOTRIN) 200 MG tablet Take 800 mg by  mouth every 8 hours as needed for mild pain Past Week at Unknown time     melatonin 5 MG tablet Take 10 mg by mouth At Bedtime 1/17/2022 at pm     mirtazapine (REMERON) 30 MG tablet Take 30 mg by mouth At Bedtime 1/17/2022 at pm     Multiple Vitamin (TAB-A-TENZIN PO) Take 1 tablet by mouth daily. 1/17/2022 at am     sennosides (SENOKOT) 8.6 MG tablet Take 1 tablet by mouth 2 times daily 1/18/2022 at pm     sodium fluoride dental gel (PREVIDENT) 1.1 % GEL Take by mouth At Bedtime Brush teeth with a pea sized amount for 60 seconds once daily. 1/17/2022 at am     zolpidem (AMBIEN) 10 MG tablet TAKE 1 TABLET BY MOUTH AT BEDTIME *6 TOTAL FILLS* 1/17/2022 at pm       Information source(s): Facility ([facility]/) medication list/MAR  Method of interview communication: N/A    Summary of Changes to PTA Med List  New: ibuprofen, aripiprazole  Discontinued: Tylenol, milk of mag  Changed: none    Patient was asked about OTC/herbal products specifically.  PTA med list reflects this.    In the past week, patient estimated taking medication this percent of the time:  greater than 90%.    Allergies were reviewed, assessed, and updated with the patient.      Patient did not bring any medications to the hospital and can't retrieve from home. No multi-dose medications are available for use during hospital stay.     The information provided in this note is only as accurate as the sources available at the time of the update(s).    Thank you for the opportunity to participate in the care of this patient.    Yamilka Pearson RPH  1/18/2022 7:38 AM

## 2022-01-18 NOTE — ED NOTES
Patient placed in bilateral soft wrist restraints for medical interventions.IV started,labs drawn and O2 applied at 3L/NC.

## 2022-01-18 NOTE — ED NOTES
Patient transported by one to one tech to CT. Patient seems more relaxed after ativan administration

## 2022-01-18 NOTE — ED PROVIDER NOTES
5:54 AM.  Medical patient signed out to me to be admitted.  EKG showing normal sinus rhythm at 80 with premature supraventricular complexes.  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  This was seen previously in EKG of January 23, 2019.  Otherwise no acute findings are noted.  EKG very similar to previous EKG.     Crispin Garrett MD  01/18/22 0586

## 2022-01-18 NOTE — ED NOTES
Allowed for range of motion, patient ripped off mask and continues to pull off nasal canula with restraints back in place. Patient due to void. Tech to obtain bladder scan

## 2022-01-18 NOTE — PROGRESS NOTES
Received intake call for home oxygen at 4:11 AM. Reviewed patient's chart; Patient qualifies under covid relaxed insurance guidelines: missing o2 smartphrase.  .   4:18 AM -  Spoke with ED nurse confirmed we received the order. Asked to have provider enter the [id number] dot phrase as a chart note (on the discharge summary or in a progress note in the chart). I also asked if the provider could please addend the length of need to lifetime.   4:48 AM - Spoke with MEAGAN Pratt in ED. Advised that I did see that the length of need has been updated on the order. We still need the [id number] dot phrase added to the chart notes yet.   5:15AM - Received signed [id number] dot phrase. Spoke with MEAGAN Montiel in ED to confirm ETA of delivery of oxygen. Dinesh advised me that they have decided to admit this patient instead. Please cancel oxygen order.

## 2022-01-18 NOTE — ED NOTES
Bed: JNED-22  Expected date: 1/18/22  Expected time: 3:46 AM  Means of arrival: Ambulance  Comments:  [facility]  62F, Covid+

## 2022-01-19 VITALS
SYSTOLIC BLOOD PRESSURE: 117 MMHG | DIASTOLIC BLOOD PRESSURE: 59 MMHG | HEIGHT: 63 IN | WEIGHT: 140 LBS | OXYGEN SATURATION: 94 % | HEART RATE: 79 BPM | RESPIRATION RATE: 18 BRPM | BODY MASS INDEX: 24.8 KG/M2 | TEMPERATURE: 98.1 F

## 2022-01-19 LAB
ANION GAP SERPL CALCULATED.3IONS-SCNC: 9 MMOL/L (ref 5–18)
BUN SERPL-MCNC: 22 MG/DL (ref 8–22)
C REACTIVE PROTEIN LHE: 20.7 MG/DL (ref 0–0.8)
CALCIUM SERPL-MCNC: 8.9 MG/DL (ref 8.5–10.5)
CHLORIDE BLD-SCNC: 103 MMOL/L (ref 98–107)
CO2 SERPL-SCNC: 26 MMOL/L (ref 22–31)
CREAT SERPL-MCNC: 0.55 MG/DL (ref 0.6–1.1)
D DIMER PPP FEU-MCNC: 1.24 UG/ML FEU (ref 0–0.5)
ERYTHROCYTE [DISTWIDTH] IN BLOOD BY AUTOMATED COUNT: 13.8 % (ref 10–15)
GFR SERPL CREATININE-BSD FRML MDRD: >90 ML/MIN/1.73M2
GLUCOSE BLD-MCNC: 72 MG/DL (ref 70–125)
HCT VFR BLD AUTO: 34.3 % (ref 35–47)
HGB BLD-MCNC: 11.4 G/DL (ref 11.7–15.7)
MCH RBC QN AUTO: 33.9 PG (ref 26.5–33)
MCHC RBC AUTO-ENTMCNC: 33.2 G/DL (ref 31.5–36.5)
MCV RBC AUTO: 102 FL (ref 78–100)
PLATELET # BLD AUTO: 300 10E3/UL (ref 150–450)
POTASSIUM BLD-SCNC: 3.9 MMOL/L (ref 3.5–5)
RBC # BLD AUTO: 3.36 10E6/UL (ref 3.8–5.2)
SODIUM SERPL-SCNC: 138 MMOL/L (ref 136–145)
WBC # BLD AUTO: 8.5 10E3/UL (ref 4–11)

## 2022-01-19 PROCEDURE — 250N000011 HC RX IP 250 OP 636: Performed by: HOSPITALIST

## 2022-01-19 PROCEDURE — 99239 HOSP IP/OBS DSCHRG MGMT >30: CPT | Performed by: EMERGENCY MEDICINE

## 2022-01-19 PROCEDURE — 80048 BASIC METABOLIC PNL TOTAL CA: CPT | Performed by: HOSPITALIST

## 2022-01-19 PROCEDURE — 85379 FIBRIN DEGRADATION QUANT: CPT | Performed by: HOSPITALIST

## 2022-01-19 PROCEDURE — 86140 C-REACTIVE PROTEIN: CPT | Performed by: HOSPITALIST

## 2022-01-19 PROCEDURE — 36415 COLL VENOUS BLD VENIPUNCTURE: CPT | Performed by: HOSPITALIST

## 2022-01-19 PROCEDURE — 85027 COMPLETE CBC AUTOMATED: CPT | Performed by: HOSPITALIST

## 2022-01-19 PROCEDURE — 250N000013 HC RX MED GY IP 250 OP 250 PS 637: Performed by: HOSPITALIST

## 2022-01-19 RX ADMIN — SENNOSIDES 1 TABLET: 8.6 TABLET ORAL at 10:47

## 2022-01-19 RX ADMIN — PANTOPRAZOLE SODIUM 40 MG: 20 TABLET, DELAYED RELEASE ORAL at 10:46

## 2022-01-19 RX ADMIN — CALCIUM CARBONATE-VITAMIN D TAB 500 MG-200 UNIT 1 TABLET: 500-200 TAB at 10:46

## 2022-01-19 RX ADMIN — ENOXAPARIN SODIUM 40 MG: 40 INJECTION SUBCUTANEOUS at 10:48

## 2022-01-19 RX ADMIN — CITALOPRAM HYDROBROMIDE 60 MG: 40 TABLET ORAL at 11:37

## 2022-01-19 RX ADMIN — LEVOCARNITINE 330 MG: 330 TABLET ORAL at 11:38

## 2022-01-19 RX ADMIN — CIPROFLOXACIN AND DEXAMETHASONE 4 DROP: 3; 1 SUSPENSION/ DROPS AURICULAR (OTIC) at 11:38

## 2022-01-19 RX ADMIN — CLONAZEPAM 0.5 MG: 0.5 TABLET ORAL at 04:01

## 2022-01-19 RX ADMIN — DOCUSATE SODIUM 200 MG: 100 CAPSULE, LIQUID FILLED ORAL at 11:37

## 2022-01-19 RX ADMIN — CARBAMAZEPINE 200 MG: 200 TABLET ORAL at 11:39

## 2022-01-19 RX ADMIN — DIVALPROEX SODIUM 1000 MG: 500 TABLET, DELAYED RELEASE ORAL at 11:39

## 2022-01-19 RX ADMIN — CYCLOSPORINE 1 DROP: 0.5 EMULSION OPHTHALMIC at 11:37

## 2022-01-19 RX ADMIN — DEXAMETHASONE SODIUM PHOSPHATE 6 MG: 4 INJECTION, SOLUTION INTRAMUSCULAR; INTRAVENOUS at 12:52

## 2022-01-19 ASSESSMENT — ACTIVITIES OF DAILY LIVING (ADL)
ADLS_ACUITY_SCORE: 7.75
ADLS_ACUITY_SCORE: 5.75
ADLS_ACUITY_SCORE: 5.75
ADLS_ACUITY_SCORE: 7.75
ADLS_ACUITY_SCORE: 5.75
ADLS_ACUITY_SCORE: 5.75

## 2022-01-19 NOTE — ED NOTES
Patient pulled off nasal canula, writer observed saturations to assess need current O2 needs. Patient remained >90% on room air. Will continue to monitor.

## 2022-01-19 NOTE — ED NOTES
"ER Boarding Note:    Alert.  Restless in room.  Attempts to remove gown, cardiac monitoring, figure sat probe.  Cobanned PIV, pt removed.  Retaped PIV, better.  Grabs out.    COVID +.  On RA.  Sats are mid 90s.  Dry cough~one episode.  No respiratory distress.    HR 60s.    Brief on.  Purewick off for now.  Breakfast tray ordered, including jello and oatmeal added.    Had bilateral soft wrist restraints on, removed at 0750.  Plan: PO meds.  Will discharge today home with Jordon Suarez.  Attempted to call Daniela.    VS: /66   Pulse 67   Temp 98.1  F (36.7  C) (Axillary)   Resp 20   Ht 1.6 m (5' 3\")   Wt 63.5 kg (140 lb)   SpO2 93%   BMI 24.80 kg/m  .      "

## 2022-01-19 NOTE — DISCHARGE SUMMARY
[facility] MEDICINE  DISCHARGE SUMMARY     Primary Care Physician: Deion Muniz  Admission Date: 1/18/2022   Discharge Provider: Dieter Reaves MD Discharge Date: 1/19/2022   Diet:   Active Diet and Nourishment Order   Procedures     Regular Diet Adult Thin Liquids (level 0)     Diet       Code Status: Full Code   Activity: DCACTIVITY: Activity as tolerated        Condition at Discharge: Satisfactory     REASON FOR PRESENTATION(See Admission Note for Details)   Hypoxia, COVID-19    PRINCIPAL & ACTIVE DISCHARGE DIAGNOSES     Active Problems:    Hypoxemia    Infection due to 2019 novel coronavirus      PENDING LABS     Unresulted Labs Ordered in the Past 30 Days of this Admission     No orders found for last 31 day(s).            PROCEDURES ( this hospitalization only)          RECOMMENDATIONS TO OUTPATIENT PROVIDER FOR F/U VISIT     Follow-up Appointments     Follow-up and recommended labs and tests       Follow-up to assess COVID recovery in 5 to 7 days             {Additional follow-up instructions/to-do's for PCP    : Monitor recovery from COVID    DISPOSITION     Home    SUMMARY OF HOSPITAL COURSE:      Michell is a 61-year-old group home patient with profound cognitive impairment admitted with hypoxia after a COVID exposure.  She tested positive on January 18, 2022 on admission to the hospital.  She initially required 3 L of O2 by nasal cannula but was very shortly weaned off and has been off of oxygen for the last  21 hours.  She had some agitation overnight, perhaps from the steroids versus new environment.  Her vitals remain completely normal, her lungs are clear.  She is not able to provide any history but currently there is no indication for further hospitalization.  I spoke with her niece Daniela, Michell is unvaccinated.  Michell was discharged back to her group home with the addition of anticoagulation.  Currently there is no medical contraindication to  discharge from the hospital.  We will not continue her steroids as this likely did not account for her rapid improvement and I am concerned the steroids have caused more risk than benefit with agitation.    Discharge Medications with Med changes:     Current Discharge Medication List      START taking these medications    Details   apixaban ANTICOAGULANT (ELIQUIS) 2.5 MG tablet Take 1 tablet (2.5 mg) by mouth 2 times daily  Qty: 60 tablet, Refills: 0    Associated Diagnoses: Infection due to 2019 novel coronavirus         CONTINUE these medications which have NOT CHANGED    Details   ARIPiprazole (ABILIFY) 5 MG tablet Take 5 mg by mouth At Bedtime      calcium-vitamin D (CALTRATE) 600-400 MG-UNIT per tablet Take 1 tablet by mouth 2 times daily. This replaces the old calcium prescription  Qty: 180 tablet, Refills: 3    Associated Diagnoses: Osteopenia      !! carBAMazepine (TEGRETOL) 200 MG tablet Take 300 mg by mouth At Bedtime       !! carBAMazepine (TEGRETOL) 200 MG tablet Take 200 mg by mouth 2 times daily Morning and at 3pm      CARNITOR 330 MG OR TABS Take 330 mg by mouth 3 times daily       CHLORHEXIDINE GLUCONATE 0.12 % MT SOLN SWABBED TO TEETH DAILY      ciprofloxacin-dexamethasone (CIPRODEX) otic suspension Place 4 drops Into the left ear 2 times daily       citalopram (CELEXA) 40 MG tablet Take 60 mg by mouth daily 1 and 1/2 tablets daily      clonazePAM (KLONOPIN) 1 MG tablet TAKE 1/2 TABLET (0.5MG) BY MOUTH AT BEDTIME. *6 TOTAL FILLS*  Qty: 15 tablet, Refills: 5    Comments: RX AUDIT. PT IS OUT OF REFILLS  Associated Diagnoses: Anxiety      COLACE 100 MG OR CAPS Take 200 mg by mouth 2 times daily       cycloSPORINE (RESTASIS) 0.05 % ophthalmic emulsion Place 1 drop into both eyes every 12 hours.      DEPAKOTE 500 MG OR TBEC Take 1,000 mg by mouth 3 times daily       diphenhydrAMINE (BENADRYL) 25 MG tablet Give 50 mg (2 capsules) 1 hour before Bactrim doses.  Qty: 30 tablet, Refills: 1      !!  esomeprazole (NEXIUM) 20 MG DR capsule Take 40 mg by mouth every morning (before breakfast)       !! esomeprazole (NEXIUM) 40 MG DR capsule Take 20 mg by mouth daily In the afternoon      ibuprofen (ADVIL/MOTRIN) 200 MG tablet Take 800 mg by mouth every 8 hours as needed for mild pain      melatonin 5 MG tablet Take 10 mg by mouth At Bedtime      mirtazapine (REMERON) 30 MG tablet Take 30 mg by mouth At Bedtime      Multiple Vitamin (TAB-A-TENZIN PO) Take 1 tablet by mouth daily.      sennosides (SENOKOT) 8.6 MG tablet Take 1 tablet by mouth 2 times daily      sodium fluoride dental gel (PREVIDENT) 1.1 % GEL Take by mouth At Bedtime Brush teeth with a pea sized amount for 60 seconds once daily.  Qty: 1 Tube, Refills: 3    Associated Diagnoses: Caries      zolpidem (AMBIEN) 10 MG tablet TAKE 1 TABLET BY MOUTH AT BEDTIME *6 TOTAL FILLS*  Qty: 31 tablet, Refills: 5    Comments: REFILL REQUEST PLEASE REVIEW & ADDRESS ASAP  THANK YOU  RX AUDIT  Associated Diagnoses: Mood disorder (H)       !! - Potential duplicate medications found. Please discuss with provider.                Rationale for medication changes:      See summary        Consults       None    Immunizations given this encounter     Most Recent Immunizations   Administered Date(s) Administered     Influenza (IIV3) PF 12/15/2010     TD (ADULT, 7+) 01/31/2003     TDAP Vaccine (Adacel) 08/04/2014           Anticoagulation Information      Recent INR results:   Recent Labs   Lab 01/18/22  0543   INR 1.15     Warfarin doses (if applicable) or name of other anticoagulant:       SIGNIFICANT IMAGING FINDINGS     Results for orders placed or performed during the hospital encounter of 01/18/22   XR Chest Port 1 View    Impression    IMPRESSION: Hazy ill-defined left perihilar and left lower lung infiltrates concerning for possible pneumonia. Minor linear scarring involving the right lower lung unchanged. Normal heart size and pulmonary vascularity. Degenerative changes  in the spine.   CT Chest Pulmonary Embolism w Contrast    Impression    IMPRESSION:  1.  No pulmonary embolus.  2.  Covid pneumonia.  3.  Small pleural effusions.       SIGNIFICANT LABORATORY FINDINGS         Discharge Orders        COVID-19 GetWell Loop Referral      Care Coordination Referral      COVID-19 GetWell Loop Referral      Reason for your hospital stay    Transient hypoxia from COVID-pneumonia     Follow-up and recommended labs and tests     Follow-up to assess COVID recovery in 5 to 7 days     Contact provider    Contact your primary care provider if If increased trouble breathing, new arm/leg swelling, dizziness/passing out, falls, bleeding that doesn't stop, or uncontrolled pain.     Discharge - Quarantine/Isolation Instruction    Date of symptom onset:     Date of first positive test:    If you tested positive COVID-19 and show symptoms (fever, cough, body aches or trouble breathing):        Stay home and away from others (self-isolate) until:        At least 10 days have passed since your symptoms started. AND...        You've had no fever-and no medicine that reduces fever for 1 full day (24 hours). AND...         Your other symptoms have resolved (gotten better).  If you tested positive for COVID-19 but don't show symptoms:       Stay home and away from others (self-isolate) until at least 10 days have passed since the date of your first positive COVID-19 test.     Activity    Your activity upon discharge: activity as tolerated     Oxygen Order    Oxygen Documentation:   I certify that this patient, Michell Mccann has been under my care (or a nurse practitioner or physican's assistant working with me). This is the face-to-face encounter for oxygen medical necessity.      Michell Mccann is now in a chronic stable state and continues to require supplemental oxygen. Patient has continued oxygen desaturation due to covid 19 infection.    Alternative treatment(s) tried or considered and deemed  clinically infective for treatment of covid 19 include pulmonary toileting.  If portability is ordered, is the patient mobile within the home? no    **Patients who qualify for home O2 coverage under the CMS guidelines require ABG tests or O2 sat readings obtained closest to, but no earlier than 2 days prior to the discharge, as evidence of the need for home oxygen therapy. Testing must be performed while patient is in the chronic stable state. See notes for O2 sats.**     Oxygen Order    Oxygen Documentation:   I certify that this patient, Michell Mccann has been under my care (or a nurse practitioner or physican's assistant working with me). This is the face-to-face encounter for oxygen medical necessity.      Michell Mccann is now in a chronic stable state and continues to require supplemental oxygen. Patient has continued oxygen desaturation due to covid 19 infection.    Alternative treatment(s) tried or considered and deemed clinically infective for treatment of covid 19 infection include pulmonary toileting.  If portability is ordered, is the patient mobile within the home? yes    I certify that this patient, Michell Mccann has been under my care (or a nurse practitioner or physican's assistant working with me). This is the face-to-face encounter for oxygen medical necessity.      Michell Mccann is now in a chronic stable state and continues to require supplemental oxygen. Patient has continued oxygen desaturation due to covid 19 infection.    Alternative treatment(s) tried or considered and deemed clinically infective for treatment of covid 19 infection include pulmonary toileting.  If portability is ordered, is the patient mobile within the home? yes    **Patients who qualify for home O2 coverage under the CMS guidelines require ABG tests or O2 sat readings obtained closest to, but no earlier than 2 days prior to the discharge, as evidence of the need for home oxygen therapy. Testing must be performed  while patient is in the chronic stable state. See notes for O2 sats.**    I certify that this patient, Michell Mccann has been under my care (or a nurse practitioner or physican's assistant working with me). This is the face-to-face encounter for oxygen medical necessity.      Michell Mccann is now in a chronic stable state and continues to require supplemental oxygen. Patient has continued oxygen desaturation due to covid 19 infection.    Alternative treatment(s) tried or considered and deemed clinically infective for treatment of covid 19 infection   include pulmonary toileting.  If portability is ordered, is the patient mobile within the home? yes    Patients who qualify for home O2 coverage under the CMS guidelines require ABG tests or O2 sat readings obtained closest to, but no earlier than 2 days prior to the discharge, as evidence of the need for home oxygen therapy. Testing must be performed while patient is in the chronic stable state. See notes for O2 sats.     Diet    Follow this diet upon discharge: Orders Placed This Encounter      Regular Diet Adult Thin Liquids (level 0)       Examination   Physical Exam   Temp:  [98.1  F (36.7  C)] 98.1  F (36.7  C)  Pulse:  [52-79] 79  Resp:  [15-36] 20  BP: (108-147)/(55-66) 117/59  SpO2:  [90 %-99 %] 94 %  Wt Readings from Last 1 Encounters:   01/18/22 63.5 kg (140 lb)     General: Awake alert, nonverbal, nontoxic-appearing  Lungs: Clear to auscultation, no wheezes or abnormal sounds  Heart: Regular rate and rhythm  Psychiatric: Appears little restless but not agitated, lying in bed and trying to get up      Please see EMR for more detailed significant labs, imaging, consultant notes etc.    I, Dieter Reaves MD, personally saw the patient today and spent greater than 30 minutes discharging this patient.    Dieter Reaves MD  [facility]    CC:Deion Muniz

## 2022-01-19 NOTE — ED NOTES
Pt awake and appears agitated, pulling at blankets and brief, purewick out of her brief. Pt given her bedtime med that was held when she was asleep. Pt tolerated med in jello. Given warm blkt and lights dimmed. Vss. Call lt in reach. 1:1 safety watch continues.

## 2022-01-19 NOTE — ED NOTES
Patient brief and bedding changed. Maggie care provided. Repositioned. Patient finished jello with meds and drank some ice water.

## 2022-01-19 NOTE — ED NOTES
Pt resting. Remains on room air and sats remain above 90. Niece updated via phone on pt's status. Vss. 1:1 video watch continues.

## 2022-01-19 NOTE — ED NOTES
Pt continues to pull off pulse oximeter and monitoring equipment. Changed saturated brief after pt appeared agitated. Right wrist restraint removed so pt could be rolled for brief change, pt grabbed at staff's face and arm. Right wrist restraint replaced after brief change. Vss and blkts given.

## 2022-01-19 NOTE — ED NOTES
Pt care and report taken over at this time. Pt is asleep/pt oxygen saturation is at 94% room air. Pt covered with blanket, pure wick checked and is in place; suction lowered to desired level between 60-80. VSS. Lights dimmed; care channel put on.

## 2022-01-19 NOTE — ED NOTES
Patient continues to holler out loud occasionally. Nurse wrapped IV with coban which pt removed shortly after nurse left room.

## 2022-03-31 DIAGNOSIS — F06.30 MOOD DISORDER IN CONDITIONS CLASSIFIED ELSEWHERE: Primary | ICD-10-CM

## 2022-03-31 NOTE — TELEPHONE ENCOUNTER
Date of Last Office Visit: 9/13/21  Date of Next Office Visit: 6/1/22  No shows since last visit: 0  Cancellations since last visit: x 1 (3/14/22) - staff not availablle    Medication requested: Remeron 30 mg Date last ordered: Dengk Qty: Unk Refills: Dengdeya   Spoke to her staff and confirmed that pt still taking Remeron 30 mg Q HS and still has # 28 tabs left.      [place] of [place] ?: NA      Lapse in medication adherence greater than 5 days?: No  If yes, call patient and gather details: see above  Medication refill request verified as identical to current order?: Unk  Result of Last DAM, VPA, Li+ Level, CBC, or Carbamazepine Level (at or since last visit): See labs from Jan 2022    Last visit treatment plan:     Other:   The patient will return to this clinic in 6 months for medication check.  The staff agreed to call before then with any questions or concerns.     Continue with the support of the clinic, reassurance, and redirection. Staff monitoring and ongoing assessments per team plan. Current psychotropic medication appears to represent the minimum effective dosage and appears medically necessary. We will continue to monitor and reassess. This team will utilize appropriate emergency services if necessary. I will make myself available if concerns or problems arise.     Casa Bustos MD        []Medication refilled per  Medication Refill in Ambulatory Care  policy.  [x]Medication unable to be refilled by RN due to criteria not met as indicated below:    []Eligibility - not seen in the last year   []Supervision - no future appointment   [x]Compliance - no shows, cancellations or lapse in therapy.   []Verification - order discrepancy   []Controlled medication   []Medication not included in policy   []90-day supply request   [x]Other: LPN is processing request

## 2022-04-04 RX ORDER — MIRTAZAPINE 30 MG/1
TABLET, FILM COATED ORAL
Qty: 31 TABLET | Refills: 5 | Status: SHIPPED | OUTPATIENT
Start: 2022-04-04 | End: 2022-09-27

## 2022-04-05 DIAGNOSIS — F41.9 ANXIETY: ICD-10-CM

## 2022-04-06 RX ORDER — CLONAZEPAM 1 MG/1
TABLET ORAL
Qty: 15 TABLET | Refills: 0 | Status: SHIPPED | OUTPATIENT
Start: 2022-04-06 | End: 2022-05-09

## 2022-04-06 NOTE — TELEPHONE ENCOUNTER
Date of Last Office Visit: 9/13/22  Date of Next Office Visit: 6/1/22  No shows since last visit: 0  Cancellations since last visit: x 1 ( 3/14/22 - Patient Initiated (staff not available))    Medication requested: Clonazepam 1 mg Date last ordered: 9/24/21 Qty: 15 Refills: 5     clonazePAM (KLONOPIN) 1 MG tablet 15 tablet 5 9/24/2021  No   Sig: TAKE 1/2 TABLET (0.5MG) BY MOUTH AT BEDTIME. *6 TOTAL FILLS*   Sent to pharmacy as: clonazePAM 1 MG Oral Tablet (klonoPIN)   Class: E-Prescribe         [place] of [place] ?: yes  Medication last filled date: 3/8/22 Qty filled: 30/15  Other [place] ?: [place]  If yes, is this a new medication?: no  If yes, name of medication: NA and date filled: NA    Lapse in medication adherence greater than 5 days?: no  If yes, call patient and gather details: NA  Medication refill request verified as identical to current order?: yes  Result of Last DAM, VPA, Li+ Level, CBC, or Carbamazepine Level (at or since last visit): N/A    Last visit treatment plan:     Plan:  Medication Adjustment:  I will make no changes at this time.    []Medication refilled per  Medication Refill in Ambulatory Care  policy.  [x]Medication unable to be refilled by RN due to criteria not met as indicated below:    []Eligibility - not seen in the last year   []Supervision - no future appointment   [x]Compliance - no shows, cancellations or lapse in therapy   []Verification - order discrepancy   [x]Controlled medication   [x]Medication not included in policy   []90-day supply request   [x]Other: LPN is processing request

## 2022-05-02 ENCOUNTER — TELEPHONE (OUTPATIENT)
Dept: BEHAVIORAL HEALTH | Facility: CLINIC | Age: 62
End: 2022-05-02
Payer: MEDICARE

## 2022-05-02 DIAGNOSIS — G47.9 SLEEP DISORDER: Primary | ICD-10-CM

## 2022-05-02 RX ORDER — PSYLLIUM HUSK 0.4 G
CAPSULE ORAL
Qty: 120 TABLET | Refills: 11 | OUTPATIENT
Start: 2022-05-02

## 2022-05-03 NOTE — TELEPHONE ENCOUNTER
Avinash, staff at the facility where this pt lives, called re:her melatonin refill.  She says they requested it from the pharmacy and were told it was denied b/c pt needs to see Dr Bustos.  Pt has an appt scheduled for 6/1.  Please contact Avinash at [phone] to discuss/clarify.

## 2022-05-04 NOTE — TELEPHONE ENCOUNTER
Date of Last Office Visit: 9/13/21  Date of Next Office Visit: 6/1/22  No shows since last visit: no  Cancellations since last visit: no    Medication requested: Melatonin mg PO HS Date last ordered: Historical  Qty: unknown Refills: unknown     [place] of [place] ?: NA    Lapse in medication adherence greater than 5 days?: NA  If yes, call patient and gather details: NA  Medication refill request verified as identical to current order?: yes  Result of Last DAM, VPA, Li+ Level, CBC, or Carbamazepine Level (at or since last visit):     Last visit treatment plan: Plan:  Medication Adjustment:  I will make no changes at this time.     Staff interview, chart review and documentation was 21 minutes.     Other:   The patient will return to this clinic in 6 months for medication check.  The staff agreed to call before then with any questions or concerns.     Continue with the support of the clinic, reassurance, and redirection. Staff monitoring and ongoing assessments per team plan. Current psychotropic medication appears to represent the minimum effective dosage and appears medically necessary. We will continue to monitor and reassess. This team will utilize appropriate emergency services if necessary. I will make myself available if concerns or problems arise.     Casa Bustos MD    []Medication refilled per  Medication Refill in Ambulatory Care  policy.  [x]Medication unable to be refilled by RN due to criteria not met as indicated below:    []Eligibility - not seen in the last year   []Supervision - no future appointment   []Compliance - no shows, cancellations or lapse in therapy   []Verification - order discrepancy   []Controlled medication   [x]Medication not included in policy   []90-day supply request   []Other

## 2022-05-04 NOTE — TELEPHONE ENCOUNTER
Prescription for melatonin 5 mg(2 tabs) for a total of 10 mg PO q HS sent to [facility] as ordered by Dr. Bustos.  Returned call to [facility] and spoke to [facility] to inform her that a new prescription was sent to [facility].  She thanked Dr. Bustos and RN.

## 2022-05-04 NOTE — TELEPHONE ENCOUNTER
Spoke to Avinash at [facility].  She indicated that this patient needed a refill of Melatonin 10 mg PO at bedtime.  Called [facility] who indicated that recent refill request was denied.  Patient has a future appointment 6/1/22 with Dr. Bustos.  Refill request for this medication submitted to Dr. Bustos for his review.  Group Home staff contacted and made aware of recent actions.

## 2022-05-04 NOTE — TELEPHONE ENCOUNTER
Avinash called back looking for update regarding encounter from 5/2. CALL her at [phone] to update.  Thank you.

## 2022-05-04 NOTE — TELEPHONE ENCOUNTER
I am not sure I understand why the melatonin order was not go through.  Can we do it is a verbal order?  Also, it is over-the-counter and they can just buy it on the pharmacy shelf if they want.

## 2022-05-06 DIAGNOSIS — F41.9 ANXIETY: ICD-10-CM

## 2022-05-09 ENCOUNTER — TELEPHONE (OUTPATIENT)
Dept: BEHAVIORAL HEALTH | Facility: CLINIC | Age: 62
End: 2022-05-09
Payer: MEDICARE

## 2022-05-09 RX ORDER — CLONAZEPAM 1 MG/1
TABLET ORAL
Qty: 15 TABLET | Refills: 4 | Status: SHIPPED | OUTPATIENT
Start: 2022-05-09 | End: 2022-09-07

## 2022-05-09 NOTE — TELEPHONE ENCOUNTER
"Returned phone call to this patients group home [facility] to inform them that Dr. Bustos has refilled the Klonapin prescription.  Group home staff thanked Dr. Bustos and RN.  They will be waiting for a call back from [facility] indicating how the medications will be delivered.      "

## 2022-05-09 NOTE — TELEPHONE ENCOUNTER
Date of Last Office Visit: 9/13/21  Date of Next Office Visit: 6/1/22  No shows since last visit: none  Cancellations since last visit: 3/14/22    Medication requested: clonazepam 1mg Date last ordered: 4/6/22 Qty: 15 Refills: 0     [place] ?: [place]  Medication last filled date: 4/6/22 Qty filled: 15  [place] substance [place] ?: yes  If yes, is this a new medication?: no      Lapse in medication adherence greater than 5 days?: no  If yes, call patient and gather details:   Medication refill request verified as identical to current order?: yes  Result of Last DAM, VPA, Li+ Level, CBC, or Carbamazepine Level (at or since last visit): N/A    Last visit treatment plan:   Plan:  Medication Adjustment:  I will make no changes at this time.     Staff interview, chart review and documentation was 21 minutes.     Other:   The patient will return to this clinic in 6 months for medication check.  The staff agreed to call before then with any questions or concerns.    []Medication refilled per  Medication Refill in Ambulatory Care  policy.  [x]Medication unable to be refilled by RN due to criteria not met as indicated below:    []Eligibility - not seen in the last year   []Supervision - no future appointment   [x]Compliance - no shows, cancellations or lapse in therapy   []Verification - order discrepancy   [x]Controlled medication   []Medication not included in policy   []90-day supply request   []Other

## 2022-05-09 NOTE — TELEPHONE ENCOUNTER
Reason for call:  Medication   If this is a refill request, has the caller requested the refill from the pharmacy already? Yes  Will the patient be using a [facility]? No  Name of the pharmacy and phone number for the current request: [facility] [address] - [place], [place] - [address] [address]    Name of the medication requested: clonazePAM (KLONOPIN) 1 MG tablet      Other request: pharm asking if 31 day refill would be OK   [facility] [phone]    Phone number to reach patient:  Home number on file [phone] (home)    Best Time:  any    Can we leave a detailed message on this number?  Not Applicable    Travel screening: Not Applicable

## 2022-06-01 ENCOUNTER — VIRTUAL VISIT (OUTPATIENT)
Dept: BEHAVIORAL HEALTH | Facility: CLINIC | Age: 62
End: 2022-06-01
Payer: MEDICARE

## 2022-06-01 ENCOUNTER — TELEPHONE (OUTPATIENT)
Dept: BEHAVIORAL HEALTH | Facility: CLINIC | Age: 62
End: 2022-06-01

## 2022-06-01 DIAGNOSIS — F39 MOOD DISORDER (H): ICD-10-CM

## 2022-06-01 PROCEDURE — 99213 OFFICE O/P EST LOW 20 MIN: CPT | Performed by: PSYCHIATRY & NEUROLOGY

## 2022-06-01 RX ORDER — ZOLPIDEM TARTRATE 10 MG/1
TABLET ORAL
Qty: 31 TABLET | Refills: 5 | Status: CANCELLED | OUTPATIENT
Start: 2022-06-01

## 2022-06-01 RX ORDER — ZOLPIDEM TARTRATE 10 MG/1
10 TABLET ORAL AT BEDTIME
Qty: 30 TABLET | Refills: 3 | Status: SHIPPED | OUTPATIENT
Start: 2022-06-01 | End: 2022-11-22

## 2022-06-01 NOTE — PATIENT INSTRUCTIONS
The patient seems to be doing relatively well and is tolerating the medication.  I will make no changes at this time.  The patient should return to this clinic in 4 to 6 months.

## 2022-06-01 NOTE — NURSING NOTE
This video/telephone visit will be conducted via a call between you and your physician/provider. We have found that certain health care needs can be provided without the need for an in-person physical exam. This service lets us provide the care you need with a video /telephone conversation. If a prescription is necessary we can send it directly to your pharmacy. If lab work is needed we can place an order for that and you can then stop by our lab to have the test done at a later time.    Just as we bill insurance for in-person visits, we also bill insurance for video/telephone visits. If you have questions about your insurance coverage, we recommend that you speak with your insurance company.    Patient has given verbal consent for video/Telephone visit? Yes    Patient would like telephone visit, please connect : [phone], caregiver will complete visit with provider on behalf of pt    Reason for visit: follow up meds  Patient verified allergies, medications and pharmacy via phone.       Patient states she  is ready for visit.   Caregiver states the Zolpidem is written as needed and they need it switched to daily. Pt needs this medication daily.     Dary Joseph MA June 1, 2022 1:20 PM    Medications Phoned  to Pharmacy [] yes [x]no     Medications ordered this visit were e-scribed.  Verified by order class [x] yes  [] no  List medications sent to pharmacy:    Zolpidem 10mg    Medication changes or discontinuations were communicated to patient's pharmacy: [] yes  [x] no     Dictation completed at time of chart check: [x] yes  [] no     I have checked the documentation for today s encounters and the above information has been reviewed and completed.        Dary Joseph MA June 1, 2022 2:14 PM

## 2022-06-01 NOTE — PROGRESS NOTES
Pertinent History: She has a long-standing history of developmental delay as well as atypical psychosis and a history of very significant behavioral dyscontrol and mood instability.  Over the winter 2018 we did increase the Remeron to 30 mg.       I saw the patient in September 2018.  At that time we increased the patient's Risperdal as well as the patient's melatonin due to some insomnia and behavioral difficulties.     I saw the patient in January 2018.  At that time we did taper off the patient's Risperdal over the next couple of weeks due to some ongoing behavioral difficulties despite that trial.  She had had a seizure in September and frequent urinary tract infections since then which may have contributed.  She was staying in bed much of the day despite sleeping well at night and was combative with redirection at that time.     I saw the patient in May 2019.  At that time we continued with the Resporal.  We were considering a change to Abilify.  Over the phone in early June given the patient's ongoing difficulties we did discontinue the Risperdal and started the patient on low-dose Abilify.       I saw the patient in July 2019.  There is been some improvement in her appetite but continued to be restless and periodically agitated and possibly hypomanic.  She had tolerated the recently started Abilify.  We did increase that to 2 mg twice a day.  Since that visit we did change the Abilify to 5 mg at night only.      I saw the patient in the winter of 2020. We did not make any medication changes at that time but did consider an increase in her psychotropic medication. Tiffany reportedly improved with her auditory hallucinations but still had some periods of restlessness and agitation although they were less intense. The primary care doctor was considering in EKG at the last visit due to the patient's medication list.     I saw the patient for a virtual visit on July 13 of 2020. She was doing fairly well at that  time and we did not make any medication changes. She continued to be followed through her medical team as well.     The patient had an appointment by phone in September 2020.  There was no significant change in the patient's presentation and she was tolerating the current treatment plan.  She still was having cyclical behavioral difficulties.  The team was working on sleep hygiene issues.  We did not make any medication changes at that visit.    I saw the patient in March 2021.  There were no significant changes at that time.  She was tolerating the medication.  We continued with the treatment plan and medical follow-up.    I saw the patient in September 2021.  The patient continues to be sensitive to her environment and changes.  Her brother did pass away from coronavirus and that was difficult for her.  She has had a change in her schedule as well.  Communication was still difficult.  There were no other significant changes and the patient continue to follow with her medical team for her seizure medication and labs.  We did not make any medication changes.      Current Symptoms:   I had an opportunity to interview the patient's staff member Avinash.  No reports of any concerns or complaints, and in fact the patient has been doing better as the weather has improved and the patient is able to engage in more activities outside.  Also the easing of the coronavirus restrictions which were quite onerous for the patient has also caused things to be better.  The patient still has variable sleep at times and it is still hard to know whether she is in any distress due to her poor communication but overall the staff feels that she is doing quite well.  No obvious hallucinations or delusions.  No new medical illnesses or concerns.  The patient seems to be tolerating the medication without any side effects.  There is no other questions or concerns for today.  The team does report that the patient's Ambien was accidentally  changed to as needed by her primary care doctor and they would like it scheduled again as that seems to be better for the patient.        Current Medications: Please see chart. Medications personally reviewed.     Medication Compliance: YES     Side Effects to Medications:  NO        Vitals:      Wt Readings from Last 3 Encounters:   10/14/19 134 lb (60.8 kg)   07/15/19 130 lb (59 kg)   04/08/19 156 lb (70.8 kg)          Temp Readings from Last 3 Encounters:   01/24/19 97.8  F (36.6  C) (Oral)   01/22/19 98.8  F (37.1  C) (Axillary)   01/06/15 97.6  F (36.4  C) (Axillary)          BP Readings from Last 3 Encounters:   07/15/19 (!) 128/96   01/24/19 136/66   01/22/19 115/86          Pulse Readings from Last 3 Encounters:   07/15/19 (!) 57   01/24/19 85   01/22/19 84            Mental Status Exam:   Appearance: Staff were interviewed by the phone.  Behavior:  Patient has been a bit more redirectable.  She still needs reassurance at times.  Occasional restlessness.  Speech:  Patient was unable to participate.  No reports of any recent change.  She continues with severe impairments in cognition and not able to communicate..  Mood/Affect:    For the most part the patient seems to be comfortable and content.  No evidence of any bridger.  Thought Content:  No evidence of psychosis. No reports of any recent psychosis.  No recent changes.  The exam is difficult however due to the patient's cognitive deficits.  Suicidal or Homicidal Thoughts: No reports of any suicidal or homicidal ideation..  Thought Process/Formulation: No significant change.  Limited interaction.  Continues severely impaired.  Associations:  Inattentive. Not able to participate.  No recent change according to the staff.  Fund of Knowledge:  Not able to participate. No reports of any recent significant change.  Attention/Concentration: Continues severely impaired with no recent changes.  Insight:  Severely impaired.  No recent change.  Judgement:  Severely  impaired.  No recent change.  Memory:  Severely impaired. No participation.   Motor Status:  Currently not able to participate. No tremor Reported by staff.   Orientation: Not able to participate.  No reports of any significant change.  Continues impaired by report.  Not able to demonstrate orientation.      Diagnosis managed and treated at today's visit :     Major neurocognitive disorder disorder, NOS with resultant mood disturbance and behavioral dyscontrol.  Atypical psychosis    Developmental delay, by history      Plan:  Medication Adjustment:  We will continue with the current medications.     Staff interview, chart review and documentation was 19 minutes.     Other:   The patient will return to this clinic in 4-6 months for medication check.  The staff agreed to call before then with any questions or concerns.     Continue with the support of the clinic, reassurance, and redirection. Staff monitoring and ongoing assessments per team plan. Current psychotropic medication appears to represent the minimum effective dosage and appears medically necessary. We will continue to monitor and reassess. This team will utilize appropriate emergency services if necessary. I will make myself available if concerns or problems arise.     Casa Bustos MD

## 2022-06-01 NOTE — TELEPHONE ENCOUNTER
Reason for Call: Medication question    Detailed comments: 61/22 Received call from Yasmine ([facility]), would like a verbal consent to prescribed 31 tablets of Zolpidem, instead of 30 tablets, due to they fill the medications for the group home for a 30 days supply, and would like to include the patient medication in that supply.     Phone Number can be reached at: [phone]    Best Time: Anytime    Can we leave a detailed message on this number? Yes    Call taken on 6/1/2022 at 2:36 PM by Santiago Yusuf

## 2022-06-02 NOTE — TELEPHONE ENCOUNTER
Returned call to Yasmine at [facility] pharmacy. Gave verbal order to dispense 31 tablets per provider directive.

## 2022-06-02 NOTE — TELEPHONE ENCOUNTER
Are you saying that I need to call them to give the verbal order or is that something you can do?  Thanks, Berny

## 2022-07-27 ENCOUNTER — TELEPHONE (OUTPATIENT)
Dept: NEUROLOGY | Facility: CLINIC | Age: 62
End: 2022-07-27

## 2022-07-27 NOTE — TELEPHONE ENCOUNTER
Group Home staff called saying they need a refill on pt's melatonin.  They are hoping to have it changed to a regularly nightly med instead of a prn.  Please call [phone] if there are any questions.

## 2022-07-28 NOTE — TELEPHONE ENCOUNTER
"Returned call to [facility]. Staff said that they hav a prescription for the patient where melatonin is marked as needed. Last prescription sent by Dr. Bustos on 5/4/22 does not include \"as needed.\" Called [facility] pharmacy and was told that the most recent prescription for the patient was from June from the patient's PCP and included \"as needed.\" Called group home staff back and explained that the order would have to be changed by the provider who sent the prescription, the PCP Deion Muniz.   "

## 2022-09-26 DIAGNOSIS — F06.30 MOOD DISORDER IN CONDITIONS CLASSIFIED ELSEWHERE: ICD-10-CM

## 2022-09-27 RX ORDER — MIRTAZAPINE 30 MG/1
TABLET, FILM COATED ORAL
Qty: 31 TABLET | Refills: 11 | Status: SHIPPED | OUTPATIENT
Start: 2022-09-27 | End: 2023-10-05

## 2022-09-27 NOTE — TELEPHONE ENCOUNTER
Date of Last Office Visit: 6/1/22  Date of Next Office Visit: 10/3/22  No shows since last visit: none  Cancellations since last visit: none    Medication requested: mirtazapine 30mg Date last ordered: 4/4/22 Qty: 31 Refills: 5     Lapse in medication adherence greater than 5 days?: no  If yes, call patient and gather details:    Medication refill request verified as identical to current order?: yes  Result of Last DAM, VPA, Li+ Level, CBC, or Carbamazepine Level (at or since last visit): N/A    Last visit treatment plan:   Plan:  Medication Adjustment:  We will continue with the current medications.     Staff interview, chart review and documentation was 19 minutes.     Other:   The patient will return to this clinic in 4-6 months for medication check.  The staff agreed to call before then with any questions or concerns.    []Medication refilled per  Medication Refill in Ambulatory Care  policy.  [x]Medication unable to be refilled by RN due to criteria not met as indicated below:    []Eligibility - not seen in the last year   []Supervision - no future appointment   []Compliance - no shows, cancellations or lapse in therapy   []Verification - order discrepancy   []Controlled medication   []Medication not included in policy   []90-day supply request   [x]Other - requesting early

## 2022-10-03 ENCOUNTER — VIRTUAL VISIT (OUTPATIENT)
Dept: PSYCHIATRY | Facility: CLINIC | Age: 62
End: 2022-10-03
Payer: MEDICARE

## 2022-10-03 DIAGNOSIS — F06.30 MOOD DISORDER IN CONDITIONS CLASSIFIED ELSEWHERE: Primary | ICD-10-CM

## 2022-10-03 PROCEDURE — 99442 PR PHYSICIAN TELEPHONE EVALUATION 11-20 MIN: CPT | Performed by: PSYCHIATRY & NEUROLOGY

## 2022-10-03 NOTE — PROGRESS NOTES
Pertinent History: She has a long-standing history of developmental delay as well as atypical psychosis and a history of very significant behavioral dyscontrol and mood instability.  Over the winter 2018 we did increase the Remeron to 30 mg.       I saw the patient in September 2018.  At that time we increased the patient's Risperdal as well as the patient's melatonin due to some insomnia and behavioral difficulties.     I saw the patient in January 2018.  At that time we did taper off the patient's Risperdal over the next couple of weeks due to some ongoing behavioral difficulties despite that trial.  She had had a seizure in September and frequent urinary tract infections since then which may have contributed.  She was staying in bed much of the day despite sleeping well at night and was combative with redirection at that time.     I saw the patient in May 2019.  At that time we continued with the Resporal.  We were considering a change to Abilify.  Over the phone in early June given the patient's ongoing difficulties we did discontinue the Risperdal and started the patient on low-dose Abilify.       I saw the patient in July 2019.  There is been some improvement in her appetite but continued to be restless and periodically agitated and possibly hypomanic.  She had tolerated the recently started Abilify.  We did increase that to 2 mg twice a day.  Since that visit we did change the Abilify to 5 mg at night only.      I saw the patient in the winter of 2020. We did not make any medication changes at that time but did consider an increase in her psychotropic medication. Tiffany reportedly improved with her auditory hallucinations but still had some periods of restlessness and agitation although they were less intense. The primary care doctor was considering in EKG at the last visit due to the patient's medication list.     I saw the patient for a virtual visit on July 13 of 2020. She was doing fairly well at that  time and we did not make any medication changes. She continued to be followed through her medical team as well.     The patient had an appointment by phone in September 2020.  There was no significant change in the patient's presentation and she was tolerating the current treatment plan.  She still was having cyclical behavioral difficulties.  The team was working on sleep hygiene issues.  We did not make any medication changes at that visit.    I saw the patient in March 2021.  There were no significant changes at that time.  She was tolerating the medication.  We continued with the treatment plan and medical follow-up.    I saw the patient in September 2021.  The patient continues to be sensitive to her environment and changes.  Her brother did pass away from coronavirus and that was difficult for her.  She has had a change in her schedule as well.  Communication was still difficult.  There were no other significant changes and the patient continue to follow with her medical team for her seizure medication and labs.  We did not make any medication changes.    The patient was seen in June 2022.  She was doing better at that time and the staff felt because the weather was improving the patient seemed to enjoy the activities more.  Her primary doctor had changed her Ambien to as needed and she was not sleeping as well and the staff was hoping to have it scheduled again which I did.  Otherwise, we did not make any changes and the patient was stable.      Current Symptoms:   I spoke with the patient's staff member Funmi who had recently talked to the staff lead Avinash and both reported things were going quite well and they did not feel the patient needed any medication changes.  She continues with variable sleep sometimes 3 to 5 hours a night but then reported this is chronic.  She has occasional outbursts where she gets frustrated but the staff are good at redirecting her and that is unchanged and manageable.  She  does not appear to be significantly depressed.  There is no obvious psychosis.  There is no self-injurious types of behaviors.  They report that they believe the patient is doing quite well and they would like no changes at this time.  They have no other questions or concerns at this time.    I had an opportunity to review the patient's labs through her primary care clinic from June and they were all reassuring.        Current Medications: Please see chart. Medications personally reviewed.     Medication Compliance: YES     Side Effects to Medications:  NO        Vitals:      Wt Readings from Last 3 Encounters:   10/14/19 134 lb (60.8 kg)   07/15/19 130 lb (59 kg)   04/08/19 156 lb (70.8 kg)          Temp Readings from Last 3 Encounters:   01/24/19 97.8  F (36.6  C) (Oral)   01/22/19 98.8  F (37.1  C) (Axillary)   01/06/15 97.6  F (36.4  C) (Axillary)          BP Readings from Last 3 Encounters:   07/15/19 (!) 128/96   01/24/19 136/66   01/22/19 115/86          Pulse Readings from Last 3 Encounters:   07/15/19 (!) 57   01/24/19 85   01/22/19 84            Mental Status Exam:   Appearance: Staff were interviewed by the phone.  Behavior:  No changes.  Occasional outbursts but for the most part redirectable.  No self-injurious behaviors.  No significant aggression.  Speech:  Continues with severely impaired communication.  No changes.  The patient was unable to participate.  Mood/Affect:   The staff report the patient appears comfortable and usually calm.  When she has outbursts she is typically redirectable.  Thought Content:   The staff do not believe there is been any psychosis.  Suicidal or Homicidal Thoughts: No reports of any suicidal or homicidal ideation..  Thought Process/Formulation:  Severely impaired with no recent change.  Not able to interact much.  Associations:   Not able to participate.  No recent change according to the staff.  Fund of Knowledge:  Not able to participate. No reports of any recent  significant change.  Attention/Concentration: The staff have not noted any significant change.  Continues severely impaired with no recent changes.  Insight:  Severely impaired.  No recent change.  Judgement:  Severely impaired.  No recent change.  Memory:  Severely impaired. No participation.   Motor Status:  Currently not able to participate. No tremor Reported by staff.   Orientation: Not able to participate.  No reports of any significant change.  Continues impaired by report.  Not able to demonstrate orientation.      Diagnosis managed and treated at today's visit :     Major neurocognitive disorder disorder, NOS with resultant mood disturbance and behavioral dyscontrol.  Atypical psychosis    Developmental delay, by history      Plan:  Medication Adjustment:  I will make no changes.     Staff interview, chart review and documentation was 17 minutes.     Other:   The patient will return to this clinic in 4-6 months for medication check.  The staff agreed to call before then with any questions or concerns.     Continue with the support of the clinic, reassurance, and redirection. Staff monitoring and ongoing assessments per team plan. Current psychotropic medication appears to represent the minimum effective dosage and appears medically necessary. We will continue to monitor and reassess. This team will utilize appropriate emergency services if necessary. I will make myself available if concerns or problems arise.     Casa Bustos MD

## 2022-10-03 NOTE — PROGRESS NOTES
Michell is a 62 year old who is being evaluated via a billable telephone visit.      What phone number would you like to be contacted at? [phone] Talk with Funmi  How would you like to obtain your AVS? Mail a copy

## 2022-10-03 NOTE — PATIENT INSTRUCTIONS
The patient is doing relatively well and tolerating the medication.  I will make no changes in her medicine at this time.  The patient will continue to follow-up with her primary care provider.  She should return to this clinic in 4 to 6 months for medication check.

## 2022-10-14 DIAGNOSIS — F41.9 ANXIETY: ICD-10-CM

## 2022-10-14 RX ORDER — CLONAZEPAM 1 MG/1
0.5 TABLET ORAL AT BEDTIME
Qty: 15 TABLET | Refills: 0 | Status: SHIPPED | OUTPATIENT
Start: 2022-10-14 | End: 2022-11-14

## 2022-10-14 NOTE — TELEPHONE ENCOUNTER
Date of Last Office Visit: 10/3/2022  Date of Next Office Visit: 4/10/2023  No shows since last visit: 0  Cancellations since last visit: 0    Medication requested: clonazePAM (KLONOPIN) 1 MG tablet Date last ordered: 9/7/2022 Qty: 15 Refills: 0     Review of [place] ?: Not a delegate for provider    Lapse in medication adherence greater than 5 days?: Potentially. Last filled per pharmacy on 9/8/2022    Medication refill request verified as identical to current order?: Yes  Result of Last DAM, VPA, Li+ Level, CBC, or Carbamazepine Level (at or since last visit): N/A    Last visit treatment plan:   Instructions    The patient is doing relatively well and tolerating the medication.  I will make no changes in her medicine at this time.  The patient will continue to follow-up with her primary care provider.  She should return to this clinic in 4 to 6 months for medication check.             []Medication refilled per  Medication Refill in Ambulatory Care  policy.  [x]Medication unable to be refilled by RN due to criteria not met as indicated below:    []Eligibility - not seen in the last year   []Supervision - no future appointment   []Compliance - no shows, cancellations or lapse in therapy   []Verification - order discrepancy   [x]Controlled medication   []Medication not included in policy   []90-day supply request   []Other

## 2022-11-11 DIAGNOSIS — F41.9 ANXIETY: ICD-10-CM

## 2022-11-11 NOTE — TELEPHONE ENCOUNTER
Date of Last Office Visit: 10/03/2022  Date of Next Office Visit: 04/10/2023  No shows since last visit: None  Cancellations since last visit: None    Medication requested:clonazePAM (KLONOPIN) 1 MG tablet  Date last ordered: 10/14/2022 Qty: 15 Refills: 0     Review of MN ?: Writer does not have access        Lapse in medication adherence greater than 5 days?: No  If yes, call patient and gather details: N/A  Medication refill request verified as identical to current order?: yes  Result of Last DAM, VPA, Li+ Level, CBC, or Carbamazepine Level (at or since last visit): N/A    Last visit treatment plan: 10/03/2022  Plan:  Medication Adjustment:  I will make no changes.    []Medication refilled per  Medication Refill in Ambulatory Care  policy.  [x]Medication unable to be refilled by RN due to criteria not met as indicated below:    []Eligibility - not seen in the last year   []Supervision - no future appointment   []Compliance - no shows, cancellations or lapse in therapy   []Verification - order discrepancy   []Controlled medication   []Medication not included in policy   []90-day supply request   [x]Other  CMA pending medication refills

## 2022-11-14 ENCOUNTER — TELEPHONE (OUTPATIENT)
Dept: NEUROLOGY | Facility: CLINIC | Age: 62
End: 2022-11-14

## 2022-11-14 RX ORDER — CLONAZEPAM 1 MG/1
TABLET ORAL
Qty: 15 TABLET | Refills: 5 | Status: SHIPPED | OUTPATIENT
Start: 2022-11-14 | End: 2023-04-19

## 2022-11-14 NOTE — TELEPHONE ENCOUNTER
[facility] Call Center    Phone Message    May a detailed message be left on voicemail: yes     Reason for Call: Medication Question or concern regarding medication   Prescription Clarification  Name of Medication: clonazePAM (KLONOPIN) 1 MG tablet  Prescribing Provider: Dr. Bustos   Pharmacy: [facility] [id number] [address]   What on the order needs clarification? Pharmacy would like a ok for a 31 day supply for next month. Please call [phone]          Action Taken: Message routed to:  Other: [facility]    Travel Screening: Not Applicable

## 2022-11-15 NOTE — TELEPHONE ENCOUNTER
Returned call to pharmacy and spoke with pharmacist Santiago, he is asking if RN can approve a 31 tablet fill for next month as there are 31 days. RN approved this refill verbally.     Stephan Iyer RN, BSN  [facility]

## 2022-11-21 DIAGNOSIS — F39 MOOD DISORDER (H): ICD-10-CM

## 2022-11-22 RX ORDER — ZOLPIDEM TARTRATE 10 MG/1
10 TABLET ORAL AT BEDTIME
Qty: 30 TABLET | Refills: 3 | Status: SHIPPED | OUTPATIENT
Start: 2022-11-22 | End: 2022-11-22

## 2022-11-22 NOTE — TELEPHONE ENCOUNTER
Date of Last Office Visit: 10/3/22  Date of Next Office Visit: 4/10/22  No shows since last visit: 0  Cancellations since last visit: 0    Disp Refills Start End ADRIANA    zolpidem (AMBIEN) 10 MG tablet 30 tablet 3 6/1/2022  --   Sig - Route: Take 1 tablet (10 mg) by mouth At Bedtime - Oral   Sent to pharmacy as: Zolpidem Tartrate 10 MG Oral Tablet (AMBIEN)       [place] of [place] ?:  UNABLE TO VERIFY - PENDING PROVIDER APPROVAL   Medication last filled date: - Qty filled: -  Other [place] ?: -  If yes, is this a new medication?: -  If yes, name of medication: - and date filled: -    Lapse in medication adherence greater than 5 days?: yes  If yes, call patient and gather details: attempted - unsuccessful  Medication refill request verified as identical to current order?: yes  Result of Last DAM, VPA, Li+ Level, CBC, or Carbamazepine Level (at or since last visit): N/A    Last visit treatment plan: Instructions    The patient is doing relatively well and tolerating the medication.  I will make no changes in her medicine at this time.  The patient will continue to follow-up with her primary care provider.  She should return to this clinic in 4 to 6 months for medication check.            []Medication refilled per  Medication Refill in Ambulatory Care  policy.  [x]Medication unable to be refilled by RN due to criteria not met as indicated below:    []Eligibility - not seen in the last year   []Supervision - no future appointment   []Compliance - no shows, cancellations or lapse in therapy   []Verification - order discrepancy   [x]Controlled medication   [x]Medication not included in policy   []90-day supply request   []Other

## 2023-02-21 DIAGNOSIS — F39 MOOD DISORDER (H): ICD-10-CM

## 2023-02-22 RX ORDER — ZOLPIDEM TARTRATE 10 MG/1
TABLET ORAL
Qty: 31 TABLET | Refills: 5 | Status: SHIPPED | OUTPATIENT
Start: 2023-02-22 | End: 2023-08-29

## 2023-02-22 NOTE — TELEPHONE ENCOUNTER
RF requested too early    Date of Last Office Visit: 10/3/22  Date of Next Office Visit: 4/10/23 @ [facility] - TBD dt to provider leaving this practice  No shows since last visit: 0  Cancellations since last visit: 0    Medication requested:zolpidem (AMBIEN) 10 MG tablet  Date last ordered: 11/22/22 Qty: 31 Refills: 3      Disp Refills Start End ADRIANA   zolpidem (AMBIEN) 10 MG tablet 31 tablet 3 11/22/2022  No   Sig - Route: Take 1 tablet (10 mg) by mouth At Bedtime - Oral   Sent to pharmacy as: Zolpidem Tartrate 10 MG Oral Tablet (AMBIEN)   Class: E-Prescribe         [place] of [place] ?:No access for this provider      Lapse in medication adherence greater than 5 days?: no  If yes, call patient and gather details: NA  Medication refill request verified as identical to current order?: yes  Result of Last DAM, VPA, Li+ Level, CBC, or Carbamazepine Level (at or since last visit): N/A    Last visit treatment plan:     Plan:  Medication Adjustment:  I will make no changes.     Staff interview, chart review and documentation was 17 minutes.     Other:   The patient will return to this clinic in 4-6 months for medication check.  The staff agreed to call before then with any questions or concerns.  []Medication refilled per  Medication Refill in Ambulatory Care  policy.  [x]Medication unable to be refilled by RN due to criteria not met as indicated below:    []Eligibility - not seen in the last year   [x]Supervision - no future appointment   []Compliance - no shows, cancellations or lapse in therapy   []Verification - order discrepancy   [x]Controlled medication   [x]Medication not included in policy   []90-day supply request   [x]Other: Requesting RF too early  LPN is processing request

## 2023-04-19 DIAGNOSIS — F41.9 ANXIETY: ICD-10-CM

## 2023-04-19 NOTE — TELEPHONE ENCOUNTER
Medication refill request for clonazePAM (KLONOPIN) 1 MG tablet.     Last Written Prescription Date:  11/14/2022  Last Fill Quantity: 15,  # refills: 5  Last office visit provider:  10/3/2022  Next appointment scheduled: None. Pt is due for a f/u appt. Will send a message to the  to contact the pt for an appt.    Medication T'd for review and signature          Missy/ Tanja,    Pt is on Dr. Bustos's [place] list. Please contact the pt for an appt with Dr. Bustos. She last saw him on 10/3/22 at his other clinic. Thank you.    JAYDEN Malone on 4/19/2023 at 1:17 PM

## 2023-04-20 RX ORDER — CLONAZEPAM 1 MG/1
TABLET ORAL
Qty: 15 TABLET | Refills: 1 | Status: SHIPPED | OUTPATIENT
Start: 2023-04-20 | End: 2023-04-26

## 2023-04-26 DIAGNOSIS — F41.9 ANXIETY: Primary | ICD-10-CM

## 2023-04-26 RX ORDER — CLONAZEPAM 1 MG/1
TABLET ORAL
Qty: 15.5 TABLET | Refills: 1 | Status: SHIPPED | OUTPATIENT
Start: 2023-04-26 | End: 2024-04-04

## 2023-04-26 RX ORDER — CLONAZEPAM 0.5 MG/1
0.5 TABLET ORAL AT BEDTIME
Qty: 31 TABLET | Refills: 1 | Status: SHIPPED | OUTPATIENT
Start: 2023-04-26 | End: 2023-06-27

## 2023-04-26 NOTE — TELEPHONE ENCOUNTER
Received a request from the pharmacy for medication clonazePAM (KLONOPIN) 1 MG tablet.    Med was sent on 4/20/2023 but per pharmacy notes, they need a 31 day supply and they also would like to know if the Rx can be for 0.5 MG instead of 1 MG.    I've set up Rx for clonazePAM (KLONOPIN) 0.5 MG tablet, take 1 tablet by mouth at bedtime with a Qty of 31 tablets, 1 refill.     Please review and approve this as appropriate. If you do approve the 0.5 MG Rx then please cancel the clonazePAM (KLONOPIN) 1 MG tablet request from the pharmacy.      Thank you.    JAYDEN Malone on 4/26/2023 at 10:44 AM

## 2023-05-11 ENCOUNTER — TELEPHONE (OUTPATIENT)
Dept: NEUROLOGY | Facility: CLINIC | Age: 63
End: 2023-05-11
Payer: MEDICARE

## 2023-05-11 NOTE — TELEPHONE ENCOUNTER
Medication question has been addressed. No further action needed at this time.    Vj Cuevas, LAT ATC on 5/11/2023 at 2:28 PM

## 2023-05-11 NOTE — TELEPHONE ENCOUNTER
For Future info - Got new phone number and address for Pt from [facility] staff person Simi Mackharshad phone [phone] and fax [phone]. Pls check medication task from 4/26. Did this get resolved? Yes, phone number for Pt. should be the same as Yet's.

## 2023-05-23 ENCOUNTER — VIRTUAL VISIT (OUTPATIENT)
Dept: NEUROLOGY | Facility: CLINIC | Age: 63
End: 2023-05-23
Payer: MEDICARE

## 2023-05-23 DIAGNOSIS — F39 MOOD DISORDER (H): Primary | ICD-10-CM

## 2023-05-23 PROCEDURE — 99443 PR PHYSICIAN TELEPHONE EVALUATION 21-30 MIN: CPT | Mod: 95 | Performed by: PSYCHIATRY & NEUROLOGY

## 2023-05-23 NOTE — LETTER
5/23/2023         RE: Michell Mccann  [address]    [address]  [place] [address]        Dear Colleague,    Thank you for referring your patient, Michell Mccann, to the [facility]UROLOG[facility]. Please see a copy of my visit note below.    Pertinent History: She has a long-standing history of developmental delay as well as atypical psychosis and a history of very significant behavioral dyscontrol and mood instability.  Over the winter 2018 we did increase the Remeron to 30 mg.       I saw the patient in September 2018.  At that time we increased the patient's Risperdal as well as the patient's melatonin due to some insomnia and behavioral difficulties.     I saw the patient in January 2018.  At that time we did taper off the patient's Risperdal over the next couple of weeks due to some ongoing behavioral difficulties despite that trial.  She had had a seizure in September and frequent urinary tract infections since then which may have contributed.  She was staying in bed much of the day despite sleeping well at night and was combative with redirection at that time.     I saw the patient in May 2019.  At that time we continued with the Resporal.  We were considering a change to Abilify.  Over the phone in early June given the patient's ongoing difficulties we did discontinue the Risperdal and started the patient on low-dose Abilify.       I saw the patient in July 2019.  There is been some improvement in her appetite but continued to be restless and periodically agitated and possibly hypomanic.  She had tolerated the recently started Abilify.  We did increase that to 2 mg twice a day.  Since that visit we did change the Abilify to 5 mg at night only.      I saw the patient in the winter of 2020. We did not make any medication changes at that time but did consider an increase in her psychotropic medication. Tiffany reportedly improved with her auditory hallucinations but still had  some periods of restlessness and agitation although they were less intense. The primary care doctor was considering in EKG at the last visit due to the patient's medication list.     I saw the patient for a virtual visit on July 13 of 2020. She was doing fairly well at that time and we did not make any medication changes. She continued to be followed through her medical team as well.     The patient had an appointment by phone in September 2020.  There was no significant change in the patient's presentation and she was tolerating the current treatment plan.  She still was having cyclical behavioral difficulties.  The team was working on sleep hygiene issues.  We did not make any medication changes at that visit.    I saw the patient in March 2021.  There were no significant changes at that time.  She was tolerating the medication.  We continued with the treatment plan and medical follow-up.    I saw the patient in September 2021.  The patient continues to be sensitive to her environment and changes.  Her brother did pass away from coronavirus and that was difficult for her.  She has had a change in her schedule as well.  Communication was still difficult.  There were no other significant changes and the patient continue to follow with her medical team for her seizure medication and labs.  We did not make any medication changes.    The patient was seen in June 2022.  She was doing better at that time and the staff felt because the weather was improving the patient seemed to enjoy the activities more.  Her primary doctor had changed her Ambien to as needed and she was not sleeping as well and the staff was hoping to have it scheduled again which I did.  Otherwise, we did not make any changes and the patient was stable.    The patient was seen for follow-up in October 2023.  She was doing fairly well at that time but still having variable sleep at around 5 to 8 hours per night.  Cognitively there was no change.  She  was tolerating the medication.  We elected to continue with the treatment plan and did not make any changes at that time.      Current Symptoms:   The patient again was unable to participate but I was able to speak with the staff who stated the patient was doing relatively well.  She had moved group homes in February 2023 and that led to some increase in behaviors for a while but that is all stabilized.  The patient's mood is seemed good.  There is no significant depression or anxiety.  No bridger.  No significant recent behavioral dyscontrol.  She seems comfortable and calm.  She still has periodic difficulties with sleep but that has been a chronic issue and the staff report it is not too problematic.  No change in cognition.  No new medical issues or concerns.  No side effects to the medication.  Staff is requesting changes at this time.        Current Medications: Please see chart. Medications personally reviewed.     Medication Compliance: YES     Side Effects to Medications:  NO        Vitals:      Wt Readings from Last 3 Encounters:   10/14/19 134 lb (60.8 kg)   07/15/19 130 lb (59 kg)   04/08/19 156 lb (70.8 kg)          Temp Readings from Last 3 Encounters:   01/24/19 97.8  F (36.6  C) (Oral)   01/22/19 98.8  F (37.1  C) (Axillary)   01/06/15 97.6  F (36.4  C) (Axillary)          BP Readings from Last 3 Encounters:   07/15/19 (!) 128/96   01/24/19 136/66   01/22/19 115/86          Pulse Readings from Last 3 Encounters:   07/15/19 (!) 57   01/24/19 85   01/22/19 84            Mental Status Exam:   Appearance: Staff were interviewed by the phone.  Behavior:  No changes.  Directable and overall calm and comfortable.  Speech:  Continues impaired and unable to participate.  Mood/Affect:   The staff report the patient appears comfortable and usually calm.  Typically directable.  No obvious depression.  No evidence of any bridger.  Thought Content:   The staff do not believe there is been any psychosis.  No  significant change noted.  Suicidal or Homicidal Thoughts: No reports of any suicidal or homicidal ideation..  Thought Process/Formulation:  Slow and concrete.  Not able to participate.  Associations:   Not able to participate.  No recent change according to the staff.  Fund of Knowledge:  Not able to participate. No reports of any recent significant change.  Attention/Concentration: The staff have not noted any significant change.  Continues to be severely impaired.  Insight:  Severely impaired.  No recent change.  Judgement:  Severely impaired.  No recent change.  Memory:  Severely impaired. No participation.   Motor Status:  Currently not able to participate. No tremor Reported by staff.   Orientation: Not able to participate.  No reports of any significant change.  Continues impaired by report.  Not able to demonstrate orientation.  No change.     Diagnosis managed and treated at today's visit :     Major neurocognitive disorder disorder, NOS with resultant mood disturbance and behavioral dyscontrol.  Atypical psychosis    Developmental delay, by history      Plan:  Medication Adjustment:  I will make no changes.     Staff interview, chart review and documentation was 25 minutes.     Other:   The patient will return to this clinic in 4-6 months for medication check.  The staff agreed to call before then with any questions or concerns.     Continue with the support of the clinic, reassurance, and redirection. Staff monitoring and ongoing assessments per team plan. Current psychotropic medication appears to represent the minimum effective dosage and appears medically necessary. We will continue to monitor and reassess. This team will utilize appropriate emergency services if necessary. I will make myself available if concerns or problems arise.     Casa Bustos MD     Virtual Visit Details    Type of service:  Telephone Visit   Phone call duration: 19 minutes       Again, thank you for allowing me to  participate in the care of your patient.        Sincerely,        Casa Bustos MD

## 2023-05-23 NOTE — PATIENT INSTRUCTIONS
No significant change.  The patient has remained stable.  She is moved homes.  Tolerating the medication.  The staff is requesting no changes.  She should return to the clinic in 3 to 4 months for medication check and is happy to contact before then with any questions or concerns.

## 2023-05-23 NOTE — NURSING NOTE
Is the patient currently in the state of [place]? YES    Visit mode:VIDEO    If the visit is dropped, the patient can be reconnected by: VIDEO VISIT: Text to cell phone: [phone]    Will anyone else be joining the visit? NO      How would you like to obtain your AVS? MyChart    Are changes needed to the allergy or medication list? NO    Reason for visit: RECHECK

## 2023-06-26 DIAGNOSIS — F41.9 ANXIETY: ICD-10-CM

## 2023-06-27 ENCOUNTER — TELEPHONE (OUTPATIENT)
Dept: NEUROLOGY | Facility: CLINIC | Age: 63
End: 2023-06-27
Payer: MEDICARE

## 2023-06-27 RX ORDER — CLONAZEPAM 0.5 MG/1
TABLET ORAL
Qty: 30 TABLET | Refills: 5 | Status: SHIPPED | OUTPATIENT
Start: 2023-06-27 | End: 2023-09-05

## 2023-06-27 NOTE — TELEPHONE ENCOUNTER
RX refill request for clonazePAM (KLONOPIN) 0.5 MG tablet      Last follow-up; 05/23/23 next follow-up; None     Medication T'd for review and signature    Sadie Aly MA on 6/27/2023 at 9:17 AM       clonazePAM (KLONOPIN) 0.5 MG tablet 31 tablet 1 4/26/2023  --   Sig - Route: Take 1 tablet (0.5 mg) by mouth At Bedtime - Oral

## 2023-06-27 NOTE — TELEPHONE ENCOUNTER
Called pharmacy and confirmed it is okay to dispense 31 tablets instead of 30 to create a full month supply.    Vj Cuevas, REFUGIO ATC on 6/27/2023 at 10:18 AM

## 2023-06-27 NOTE — TELEPHONE ENCOUNTER
Health Call Center    Phone Message    May a detailed message be left on voicemail: yes     Reason for Call: Medication Question or concern regarding medication   Prescription Clarification  Name of Medication:  clonazePAM (KLONOPIN) 0.5 MG tablet    Prescribing Provider: Casa Bustos MD      Pharmacy: [facility], [facility]. [place] [id number] [facility]   What on the order needs clarification? Malena from [facility] pharmacy is asking for the prescripton quantity to be changed from 30 to 31 for the months that has 31 days.     Please call Malena @ [phone] to discuss further.          Action Taken: Message routed to:  Other: [facility]    Travel Screening: Not Applicable

## 2023-07-06 ENCOUNTER — TELEPHONE (OUTPATIENT)
Dept: NEUROLOGY | Facility: CLINIC | Age: 63
End: 2023-07-06
Payer: MEDICARE

## 2023-07-06 DIAGNOSIS — G47.9 SLEEP DISORDER: Primary | ICD-10-CM

## 2023-07-06 NOTE — TELEPHONE ENCOUNTER
[facility]    Phone Message    May a detailed message be left on voicemail: yes     Reason for Call: Symptoms or Concerns     If patient has red-flag symptoms, warm transfer to triage line    Current symptom or concern: Pt is not sleeping    Symptoms have been present for:  2 week(s)    Has patient previously been seen for this? Yes    By : Dr. Bustos    Date: 07/06/23    Are there any new or worsening symptoms? Yes: Daniela called concerned that Pt isn't sleeping.  Daniela is wondering if medication should be change to help her sleep.  Daniela feels like the group is getting Pt out and moving so she feels like that's not the concern.    Please call Daniela back at [phone] to advise.      Action Taken: Message routed to:  Other: [facility]    Travel Screening: Not Applicable

## 2023-07-07 NOTE — TELEPHONE ENCOUNTER
RN reviewed scanned guardianship order. Daniela is patients court appointed guardian as of May 11, 2011.     Returned call to Daniela, she reports that pt has not been sleeping well for 2-3 weeks. She reports that patients falls asleep ok, but doesn't stay asleep. Not sleeping much during the day. She is getting about 3- 5 hours of sleep/night. Usually sleeps from 8pm-11pm and then is awake.     She is taking melatonin 5mg, Ambien 10mg, klonopin 0.5mg at bedtime, mirtazapine 30mg. She moved to new group home June 12th.     Dr. Bustos to you recommend any medication changes or PRN medications for sleep? Or should group home continue to monitor for another month and return call if sleep does not improve as she adjusts to new home?     Stephan Iyer, RN, BSN  [facility]      Pt going out for activities 1-2x/day.

## 2023-07-11 RX ORDER — CHLORHEXIDINE GLUCONATE 4 %
1 LIQUID (ML) TOPICAL AT BEDTIME
Qty: 30 TABLET | Refills: 0 | Status: SHIPPED | OUTPATIENT
Start: 2023-07-11 | End: 2024-04-04

## 2023-07-11 NOTE — TELEPHONE ENCOUNTER
LDM - Informed Daniela of provider recommendation to increase Melatonin to 12mg at Bedtime    Casa Donis MD - Lets go up to 12 mg of the melatonin.  Thanks.       Rx sent to pharmacy per MD recommendation.    Signed Prescriptions:                        Disp   Refills    Melatonin 12 MG TABS                       30 tab*0        Sig: Take 1 tablet by mouth At Bedtime  Authorizing Provider: CASA DONIS  Ordering User: SACHA VALERO RN, BSN  [facility]

## 2023-07-11 NOTE — TELEPHONE ENCOUNTER
Returned call to Daniela, to provide recommendations per Dr. Bustos (Lets have the patient increase the melatonin to 10 mg at night.  Thanks.)    Per Daniela patient is already taking 10mg of melatonin, this is confirmed with Paula at [facility] who was also on the phone call.    Dr. Bustos, do you have any further interventions or medication recommendations?    Thank you,  Jame Kemp RN, BSN  [facility]

## 2023-08-12 NOTE — PATIENT INSTRUCTIONS - HE
Labs obtained via right femoral site by trauma doctor.      Leyla Hernandez RN  08/12/23 7745 The patient is doing well and tolerating the current medication regime.  We will make no changes at this time.  The staff agreed to call before the next clinic appointment if any new issues or concerns arise.

## 2023-09-05 ENCOUNTER — VIRTUAL VISIT (OUTPATIENT)
Dept: NEUROLOGY | Facility: CLINIC | Age: 63
End: 2023-09-05
Payer: MEDICARE

## 2023-09-05 ENCOUNTER — TELEPHONE (OUTPATIENT)
Dept: NEUROLOGY | Facility: CLINIC | Age: 63
End: 2023-09-05

## 2023-09-05 DIAGNOSIS — F39 MOOD DISORDER (H): Primary | ICD-10-CM

## 2023-09-05 PROCEDURE — 99443 PR PHYSICIAN TELEPHONE EVALUATION 21-30 MIN: CPT | Mod: 95 | Performed by: PSYCHIATRY & NEUROLOGY

## 2023-09-05 RX ORDER — CITALOPRAM HYDROBROMIDE 40 MG/1
60 TABLET ORAL DAILY
Qty: 60 TABLET | Refills: 3 | Status: SHIPPED | OUTPATIENT
Start: 2023-09-05 | End: 2024-02-27

## 2023-09-05 RX ORDER — ARIPIPRAZOLE 5 MG/1
5 TABLET ORAL AT BEDTIME
Qty: 30 TABLET | Refills: 3 | Status: SHIPPED | OUTPATIENT
Start: 2023-09-05 | End: 2024-01-31

## 2023-09-05 RX ORDER — HYDROXYZINE PAMOATE 25 MG/1
25 CAPSULE ORAL 3 TIMES DAILY PRN
Qty: 90 CAPSULE | Refills: 3 | Status: SHIPPED | OUTPATIENT
Start: 2023-09-05 | End: 2024-04-04

## 2023-09-05 ASSESSMENT — PAIN SCALES - GENERAL: PAINLEVEL: NO PAIN (0)

## 2023-09-05 NOTE — PATIENT INSTRUCTIONS
I have added some low-dose as needed hydroxyzine for sleep and/or anxiety and agitation during the day.  The patient should return to this clinic in 4 to 6 months for medication check and the staff agrees to contact us before then with any concerns.

## 2023-09-05 NOTE — NURSING NOTE
Is the patient currently in the state of [place]? YES    Visit mode:TELEPHONE    If the visit is dropped, the patient can be reconnected by: VIDEO VISIT: Text to cell phone:   Telephone Information:   Mobile [phone]       Will anyone else be joining the visit? NO  (If patient encounters technical issues they should call [phone])    How would you like to obtain your AVS? MyChart    Are changes needed to the allergy or medication list? No    Reason for visit: Follow Up    Addis LOERA

## 2023-09-05 NOTE — TELEPHONE ENCOUNTER
[facility] Call Center    Phone Message    May a detailed message be left on voicemail: yes     Reason for Call: Other: Santiago the Pt's  is requesting that the Pts after visit summary be faxed over to them. Please fax [phone].     Action Taken: Message routed to:  Other: [facility]     Travel Screening: Not Applicable                                                                    I will STOP taking the medications listed below when I get home from the hospital:    glyBURIDE 5 mg oral tablet  -- 1 tab(s) by mouth once a day    Coumadin 2 mg oral tablet  -- 1 tab(s) by mouth once a day

## 2023-09-05 NOTE — PROGRESS NOTES
Pertinent History: She has a long-standing history of developmental delay as well as atypical psychosis and a history of very significant behavioral dyscontrol and mood instability.  Over the winter 2018 we did increase the Remeron to 30 mg.       I saw the patient in September 2018.  At that time we increased the patient's Risperdal as well as the patient's melatonin due to some insomnia and behavioral difficulties.     I saw the patient in January 2018.  At that time we did taper off the patient's Risperdal over the next couple of weeks due to some ongoing behavioral difficulties despite that trial.  She had had a seizure in September and frequent urinary tract infections since then which may have contributed.  She was staying in bed much of the day despite sleeping well at night and was combative with redirection at that time.     I saw the patient in May 2019.  At that time we continued with the Resporal.  We were considering a change to Abilify.  Over the phone in early June given the patient's ongoing difficulties we did discontinue the Risperdal and started the patient on low-dose Abilify.       I saw the patient in July 2019.  There is been some improvement in her appetite but continued to be restless and periodically agitated and possibly hypomanic.  She had tolerated the recently started Abilify.  We did increase that to 2 mg twice a day.  Since that visit we did change the Abilify to 5 mg at night only.      I saw the patient in the winter of 2020. We did not make any medication changes at that time but did consider an increase in her psychotropic medication. Tiffany reportedly improved with her auditory hallucinations but still had some periods of restlessness and agitation although they were less intense. The primary care doctor was considering in EKG at the last visit due to the patient's medication list.     I saw the patient for a virtual visit on July 13 of 2020. She was doing fairly well at that  time and we did not make any medication changes. She continued to be followed through her medical team as well.     The patient had an appointment by phone in September 2020.  There was no significant change in the patient's presentation and she was tolerating the current treatment plan.  She still was having cyclical behavioral difficulties.  The team was working on sleep hygiene issues.  We did not make any medication changes at that visit.    I saw the patient in March 2021.  There were no significant changes at that time.  She was tolerating the medication.  We continued with the treatment plan and medical follow-up.    I saw the patient in September 2021.  The patient continues to be sensitive to her environment and changes.  Her brother did pass away from coronavirus and that was difficult for her.  She has had a change in her schedule as well.  Communication was still difficult.  There were no other significant changes and the patient continue to follow with her medical team for her seizure medication and labs.  We did not make any medication changes.    The patient was seen in June 2022.  She was doing better at that time and the staff felt because the weather was improving the patient seemed to enjoy the activities more.  Her primary doctor had changed her Ambien to as needed and she was not sleeping as well and the staff was hoping to have it scheduled again which I did.  Otherwise, we did not make any changes and the patient was stable.    The patient was seen for follow-up in October 2023.  She was doing fairly well at that time but still having variable sleep at around 5 to 8 hours per night.  Cognitively there was no change.  She was tolerating the medication.  We elected to continue with the treatment plan and did not make any changes at that time.    The patient was seen for follow-up in May 2023.  She had moved to a new group home and after some initial adjustment he was doing well.  She is  tolerating the medication.  We elected to make no medication changes at that time.      Current Symptoms:   The patient returned for follow-up today.  The patient was unable to participate in staff provided the information.  No significant change.  The patient still has sleep issues and occasional outbursts when she is frustrated.  Going for drives helps with that and they are going to try some other redirection techniques.  The patient occasionally naps and I did talk about sleep hygiene.  No obvious change in her cognition.  No obvious psychosis about the exam is difficult.  The staff report no other new medical issues or concerns other than a pending skin biopsy but the staff did not have much information with that.  No new tremors.  No obvious side effects to the medication.  We again discussed differential diagnoses and treatment options.        Current Medications: Please see chart. Medications personally reviewed.     Medication Compliance: YES     Side Effects to Medications:  NO        Vitals:      Wt Readings from Last 3 Encounters:   10/14/19 134 lb (60.8 kg)   07/15/19 130 lb (59 kg)   04/08/19 156 lb (70.8 kg)          Temp Readings from Last 3 Encounters:   01/24/19 97.8  F (36.6  C) (Oral)   01/22/19 98.8  F (37.1  C) (Axillary)   01/06/15 97.6  F (36.4  C) (Axillary)          BP Readings from Last 3 Encounters:   07/15/19 (!) 128/96   01/24/19 136/66   01/22/19 115/86          Pulse Readings from Last 3 Encounters:   07/15/19 (!) 57   01/24/19 85   01/22/19 84            Mental Status Exam:   Appearance: Staff were interviewed by the phone.  The patient was unable to participate.  Behavior: Occasional outbursts when she is frustrated.  Taking drives with staff helps with that.  Discussed redirection techniques.  Speech: Nonverbal.  Continues impaired and unable to participate.  Mood/Affect: Mostly calm and comfortable but with occasional outbursts.  No evidence of any  obvious depression.  No  evidence of any bridger.  Thought Content:   The staff do not believe there is been any psychosis.  This is difficult to assess.  No significant change noted.  Suicidal or Homicidal Thoughts: No reports of any suicidal or homicidal ideation..  Thought Process/Formulation:  Slow and concrete.  Not able to participate.  Associations:   Not able to participate.  No recent change according to the staff.  Fund of Knowledge:  Not able to participate. No reports of any recent significant change.  Attention/Concentration: The staff have not noted any significant change.  Continues to be severely impaired.  Insight:  Severely impaired.  No recent change.  Judgement:  Severely impaired.  No recent change.  Memory:  Severely impaired. No participation.   Motor Status:  Currently not able to participate. No tremor Reported by staff.   Orientation: Not able to participate.  No reports of any significant change.  Continues impaired by report.  Not able to demonstrate orientation.  No change.     Diagnosis managed and treated at today's visit :     Major neurocognitive disorder disorder, NOS with resultant mood disturbance and behavioral dyscontrol.  Atypical psychosis    Developmental delay, by history      Plan:  Medication Adjustment:  I will make no changes.     Staff interview, chart review and documentation was 23 minutes.  The patient and staff were interviewed by phone through [employer].     Other:   The patient will return to this clinic in 3-4 months for medication check.  The staff agreed to call before then with any questions or concerns.     Continue with the support of the clinic, reassurance, and redirection. Staff monitoring and ongoing assessments per team plan. Current psychotropic medication appears to represent the minimum effective dosage and appears medically necessary. We will continue to monitor and reassess. This team will utilize appropriate emergency services if necessary. I will make myself available if  concerns or problems arise.     Casa Bustos MD     Virtual Visit Details    Type of service:  Telephone Visit   Phone call duration: 23 minutes

## 2023-09-05 NOTE — LETTER
9/5/2023         RE: Michell Mccann  [address] [facility] [address]        Dear Colleague,    Thank you for referring your patient, Michell Mccann, to the [facility]. Please see a copy of my visit note below.    Pertinent History: She has a long-standing history of developmental delay as well as atypical psychosis and a history of very significant behavioral dyscontrol and mood instability.  Over the winter 2018 we did increase the Remeron to 30 mg.       I saw the patient in September 2018.  At that time we increased the patient's Risperdal as well as the patient's melatonin due to some insomnia and behavioral difficulties.     I saw the patient in January 2018.  At that time we did taper off the patient's Risperdal over the next couple of weeks due to some ongoing behavioral difficulties despite that trial.  She had had a seizure in September and frequent urinary tract infections since then which may have contributed.  She was staying in bed much of the day despite sleeping well at night and was combative with redirection at that time.     I saw the patient in May 2019.  At that time we continued with the Resporal.  We were considering a change to Abilify.  Over the phone in early June given the patient's ongoing difficulties we did discontinue the Risperdal and started the patient on low-dose Abilify.       I saw the patient in July 2019.  There is been some improvement in her appetite but continued to be restless and periodically agitated and possibly hypomanic.  She had tolerated the recently started Abilify.  We did increase that to 2 mg twice a day.  Since that visit we did change the Abilify to 5 mg at night only.      I saw the patient in the winter of 2020. We did not make any medication changes at that time but did consider an increase in her psychotropic medication. Tiffany reportedly improved with her auditory hallucinations but still had some periods  of restlessness and agitation although they were less intense. The primary care doctor was considering in EKG at the last visit due to the patient's medication list.     I saw the patient for a virtual visit on July 13 of 2020. She was doing fairly well at that time and we did not make any medication changes. She continued to be followed through her medical team as well.     The patient had an appointment by phone in September 2020.  There was no significant change in the patient's presentation and she was tolerating the current treatment plan.  She still was having cyclical behavioral difficulties.  The team was working on sleep hygiene issues.  We did not make any medication changes at that visit.    I saw the patient in March 2021.  There were no significant changes at that time.  She was tolerating the medication.  We continued with the treatment plan and medical follow-up.    I saw the patient in September 2021.  The patient continues to be sensitive to her environment and changes.  Her brother did pass away from coronavirus and that was difficult for her.  She has had a change in her schedule as well.  Communication was still difficult.  There were no other significant changes and the patient continue to follow with her medical team for her seizure medication and labs.  We did not make any medication changes.    The patient was seen in June 2022.  She was doing better at that time and the staff felt because the weather was improving the patient seemed to enjoy the activities more.  Her primary doctor had changed her Ambien to as needed and she was not sleeping as well and the staff was hoping to have it scheduled again which I did.  Otherwise, we did not make any changes and the patient was stable.    The patient was seen for follow-up in October 2023.  She was doing fairly well at that time but still having variable sleep at around 5 to 8 hours per night.  Cognitively there was no change.  She was tolerating  the medication.  We elected to continue with the treatment plan and did not make any changes at that time.    The patient was seen for follow-up in May 2023.  She had moved to a new group home and after some initial adjustment he was doing well.  She is tolerating the medication.  We elected to make no medication changes at that time.      Current Symptoms:   The patient returned for follow-up today.  The patient was unable to participate in staff provided the information.  No significant change.  The patient still has sleep issues and occasional outbursts when she is frustrated.  Going for drives helps with that and they are going to try some other redirection techniques.  The patient occasionally naps and I did talk about sleep hygiene.  No obvious change in her cognition.  No obvious psychosis about the exam is difficult.  The staff report no other new medical issues or concerns other than a pending skin biopsy but the staff did not have much information with that.  No new tremors.  No obvious side effects to the medication.  We again discussed differential diagnoses and treatment options.        Current Medications: Please see chart. Medications personally reviewed.     Medication Compliance: YES     Side Effects to Medications:  NO        Vitals:      Wt Readings from Last 3 Encounters:   10/14/19 134 lb (60.8 kg)   07/15/19 130 lb (59 kg)   04/08/19 156 lb (70.8 kg)          Temp Readings from Last 3 Encounters:   01/24/19 97.8  F (36.6  C) (Oral)   01/22/19 98.8  F (37.1  C) (Axillary)   01/06/15 97.6  F (36.4  C) (Axillary)          BP Readings from Last 3 Encounters:   07/15/19 (!) 128/96   01/24/19 136/66   01/22/19 115/86          Pulse Readings from Last 3 Encounters:   07/15/19 (!) 57   01/24/19 85   01/22/19 84            Mental Status Exam:   Appearance: Staff were interviewed by the phone.  The patient was unable to participate.  Behavior: Occasional outbursts when she is frustrated.  Taking drives  with staff helps with that.  Discussed redirection techniques.  Speech: Nonverbal.  Continues impaired and unable to participate.  Mood/Affect: Mostly calm and comfortable but with occasional outbursts.  No evidence of any  obvious depression.  No evidence of any bridger.  Thought Content:   The staff do not believe there is been any psychosis.  This is difficult to assess.  No significant change noted.  Suicidal or Homicidal Thoughts: No reports of any suicidal or homicidal ideation..  Thought Process/Formulation:  Slow and concrete.  Not able to participate.  Associations:   Not able to participate.  No recent change according to the staff.  Fund of Knowledge:  Not able to participate. No reports of any recent significant change.  Attention/Concentration: The staff have not noted any significant change.  Continues to be severely impaired.  Insight:  Severely impaired.  No recent change.  Judgement:  Severely impaired.  No recent change.  Memory:  Severely impaired. No participation.   Motor Status:  Currently not able to participate. No tremor Reported by staff.   Orientation: Not able to participate.  No reports of any significant change.  Continues impaired by report.  Not able to demonstrate orientation.  No change.     Diagnosis managed and treated at today's visit :     Major neurocognitive disorder disorder, NOS with resultant mood disturbance and behavioral dyscontrol.  Atypical psychosis    Developmental delay, by history      Plan:  Medication Adjustment:  I will make no changes.     Staff interview, chart review and documentation was 23 minutes.  The patient and staff were interviewed by phone through [employer].     Other:   The patient will return to this clinic in 3-4 months for medication check.  The staff agreed to call before then with any questions or concerns.     Continue with the support of the clinic, reassurance, and redirection. Staff monitoring and ongoing assessments per team plan. Current  psychotropic medication appears to represent the minimum effective dosage and appears medically necessary. We will continue to monitor and reassess. This team will utilize appropriate emergency services if necessary. I will make myself available if concerns or problems arise.     Casa Bustos MD     Virtual Visit Details    Type of service:  Telephone Visit   Phone call duration: 23 minutes       Again, thank you for allowing me to participate in the care of your patient.        Sincerely,        Casa Bustos MD

## 2023-09-05 NOTE — TELEPHONE ENCOUNTER
[facility] Call Center    Phone Message    May a detailed message be left on voicemail: yes     Reason for Call: Medication Question or concern regarding medication   Prescription Clarification  Name of Medication:   citalopram (CELEXA) 40 MG tablet .  Sig - Route: Take 1.5 tablets (60 mg) by mouth daily 1 and 1/2 tablets daily - Oral     Prescribing Provider:     Pharmacy: [facility]   What on the order needs clarification?  Pinky from [facility] pharmacy called, this is an FYI for provider. Instead of having patient put the medication in halves, patient is given a 40 and 20 MG tablet. Please call back if there are any questions.       Action Taken: Message routed to:  Other: [facility]    Travel Screening: Not Applicable

## 2023-10-05 DIAGNOSIS — F06.30 MOOD DISORDER IN CONDITIONS CLASSIFIED ELSEWHERE: ICD-10-CM

## 2023-10-05 RX ORDER — MIRTAZAPINE 30 MG/1
TABLET, FILM COATED ORAL
Qty: 31 TABLET | Refills: 11 | Status: SHIPPED | OUTPATIENT
Start: 2023-10-05 | End: 2024-04-04

## 2023-10-05 NOTE — TELEPHONE ENCOUNTER
Refill request for mirtazapine (REMERON) 30 MG tablet   Last follow-up 9/5/23; next follow-up unscheduled. Pt due for follow-up in December/January  Medication T'd for review and signature  Vj Cuevas, LAT ATC on 10/5/2023 at 10:19 AM    mirtazapine (REMERON) 30 MG tablet 31 tablet 11 9/27/2022  No   Sig: TAKE 1 TABLET BY MOUTH AT BEDTIME.

## 2023-12-04 DIAGNOSIS — F41.9 ANXIETY: ICD-10-CM

## 2023-12-04 RX ORDER — CLONAZEPAM 0.5 MG/1
TABLET ORAL
Qty: 31 TABLET | Refills: 5 | Status: SHIPPED | OUTPATIENT
Start: 2023-12-04 | End: 2024-04-04

## 2023-12-04 NOTE — TELEPHONE ENCOUNTER
Please deny Rx refill for clonazePAM (KLONOPIN) 0.5 MG tablet. Per chart, med was discontinued by you on 9/5/2023. Thank you.      JAYDEN Malone on 12/4/2023 at 2:25 PM

## 2024-01-31 ENCOUNTER — TELEPHONE (OUTPATIENT)
Dept: NEUROLOGY | Facility: CLINIC | Age: 64
End: 2024-01-31

## 2024-01-31 DIAGNOSIS — F39 MOOD DISORDER (H): ICD-10-CM

## 2024-01-31 RX ORDER — ARIPIPRAZOLE 5 MG/1
5 TABLET ORAL AT BEDTIME
Qty: 30 TABLET | Refills: 0 | Status: SHIPPED | OUTPATIENT
Start: 2024-01-31 | End: 2024-02-29

## 2024-01-31 NOTE — TELEPHONE ENCOUNTER
Refill request for the following medication (s) listed below.    Pending Prescriptions:                       Disp   Refills    ARIPiprazole (ABILIFY) 5 MG tablet [Pharm*30 tab*0            Sig: TAKE 1 TABLET BY MOUTH AT BEDTIME      Last office visit provider:  9/5/2023  Next appointment scheduled: None. Needs to be seen. Will send a TE to  to call pt for appt.      Medication T'd for review and signature        JAYDEN Malone on 1/31/2024 at 11:26 AM

## 2024-01-31 NOTE — TELEPHONE ENCOUNTER
Patient is due to come in for a follow up. Please call and help make appointment for patient to come in to be seen with Dr. Bustos.    For further refills, pt needs to be seen.    Thank you.    JAYDEN Malone on 1/31/2024 at 11:27 AM

## 2024-02-27 ENCOUNTER — TELEPHONE (OUTPATIENT)
Dept: NEUROLOGY | Facility: CLINIC | Age: 64
End: 2024-02-27
Payer: MEDICARE

## 2024-02-27 DIAGNOSIS — F39 MOOD DISORDER (H): ICD-10-CM

## 2024-02-27 RX ORDER — CITALOPRAM HYDROBROMIDE 40 MG/1
TABLET ORAL
Qty: 30 TABLET | Refills: 0 | Status: SHIPPED | OUTPATIENT
Start: 2024-02-27 | End: 2024-04-02

## 2024-02-27 RX ORDER — CITALOPRAM HYDROBROMIDE 20 MG/1
TABLET ORAL
Qty: 30 TABLET | Refills: 0 | Status: SHIPPED | OUTPATIENT
Start: 2024-02-27 | End: 2024-04-02

## 2024-02-27 NOTE — TELEPHONE ENCOUNTER
Patient is due to come in for a follow up. Please call and help make appointment for patient to come in to be seen with Dr. Bustos.    Thank you.      JAYDEN Malone on 2/27/2024 at 11:23 AM

## 2024-02-27 NOTE — TELEPHONE ENCOUNTER
Refill request for the following medication (s) listed below.    Pending Prescriptions:                       Disp   Refills    citalopram (CELEXA) 40 MG tablet [Pharmac*30 tab*0            Sig: TAKE 1 TABLET BY MOUTH ONCE DAILY ALONG WITH 20MG           FOR TOTAL DOSE = 60MG    citalopram (CELEXA) 20 MG tablet [Pharmac*30 tab*0            Sig: TAKE 1 TABLET BY MOUTH ONCE DAILY ALONG WITH 40MG           FOR TOTAL DOSE = 60MG    Last office visit provider:  9/5/2023   Next appointment scheduled: None. Pt is due for an appt. Will send TE to  to call pt for appt.      Medication T'd for review and signature        JAYDEN Malone on 2/27/2024 at 11:23 AM

## 2024-02-28 DIAGNOSIS — F39 MOOD DISORDER (H): ICD-10-CM

## 2024-02-28 NOTE — TELEPHONE ENCOUNTER
Refill request for ARIPiprazole (ABILIFY) 5 MG tablet   Last follow-up 9/5/23; Pt due for follow-up visit  Medication T'd for review and signature  Vj HUFF ATC on 2/28/2024 at 3:38 PM    ARIPiprazole (ABILIFY) 5 MG tablet 30 tablet 0 1/31/2024 -- No   Sig - Route: TAKE 1 TABLET BY MOUTH AT BEDTIME - Ora      [id number]

## 2024-02-29 RX ORDER — ARIPIPRAZOLE 5 MG/1
TABLET ORAL
Qty: 31 TABLET | Refills: 11 | Status: SHIPPED | OUTPATIENT
Start: 2024-02-29 | End: 2024-09-19

## 2024-04-01 DIAGNOSIS — F39 MOOD DISORDER (H): ICD-10-CM

## 2024-04-01 NOTE — TELEPHONE ENCOUNTER
Refill request for the following medication (s) listed below.    Pending Prescriptions:                       Disp   Refills    citalopram (CELEXA) 40 MG tablet [Pharmac*30 tab*0            Sig: TAKE 1 TABLET BY MOUTH ONCE DAILY ALONG WITH 20MG           FOR TOTAL DOSE = 60MG  *1 TOTAL FILL, PATIENT           NEEDS APPOINTMENT FOR FURTHER REFILLS*    citalopram (CELEXA) 20 MG tablet [Pharmac*30 tab*0            Sig: TAKE 1 TABLET BY MOUTH ONCE DAILY ALONG WITH 40MG           FOR TOTAL DOSE = 60MG      Last office visit provider:  9/5/2023  Next appointment scheduled: 4/4/2024      Medication T'd for review and signature      JAYDEN Malone on 4/1/2024 at 11:39 AM

## 2024-04-02 RX ORDER — CITALOPRAM HYDROBROMIDE 20 MG/1
20 TABLET ORAL DAILY
Qty: 30 TABLET | Refills: 3 | Status: SHIPPED | OUTPATIENT
Start: 2024-04-02 | End: 2024-04-04

## 2024-04-02 RX ORDER — CITALOPRAM HYDROBROMIDE 40 MG/1
TABLET ORAL
Qty: 30 TABLET | Refills: 0 | Status: SHIPPED | OUTPATIENT
Start: 2024-04-02 | End: 2024-04-04

## 2024-04-04 ENCOUNTER — TELEPHONE (OUTPATIENT)
Dept: NEUROLOGY | Facility: CLINIC | Age: 64
End: 2024-04-04
Payer: MEDICARE

## 2024-04-04 ENCOUNTER — VIRTUAL VISIT (OUTPATIENT)
Dept: NEUROLOGY | Facility: CLINIC | Age: 64
End: 2024-04-04
Payer: MEDICARE

## 2024-04-04 DIAGNOSIS — F39 MOOD DISORDER (H): ICD-10-CM

## 2024-04-04 DIAGNOSIS — F06.30 MOOD DISORDER IN CONDITIONS CLASSIFIED ELSEWHERE: ICD-10-CM

## 2024-04-04 DIAGNOSIS — F41.9 ANXIETY: ICD-10-CM

## 2024-04-04 PROCEDURE — 99443 PR PHYSICIAN TELEPHONE EVALUATION 21-30 MIN: CPT | Mod: 93 | Performed by: PSYCHIATRY & NEUROLOGY

## 2024-04-04 RX ORDER — CITALOPRAM HYDROBROMIDE 40 MG/1
TABLET ORAL
Qty: 30 TABLET | Refills: 4 | Status: SHIPPED | OUTPATIENT
Start: 2024-04-04 | End: 2024-09-19 | Stop reason: DRUGHIGH

## 2024-04-04 RX ORDER — MIRTAZAPINE 30 MG/1
30 TABLET, FILM COATED ORAL AT BEDTIME
Qty: 31 TABLET | Refills: 3 | Status: SHIPPED | OUTPATIENT
Start: 2024-04-04 | End: 2024-09-05

## 2024-04-04 RX ORDER — HYDROXYZINE PAMOATE 25 MG/1
25 CAPSULE ORAL 3 TIMES DAILY PRN
Qty: 90 CAPSULE | Refills: 3 | Status: SHIPPED | OUTPATIENT
Start: 2024-04-04 | End: 2024-04-04

## 2024-04-04 RX ORDER — ZOLPIDEM TARTRATE 10 MG/1
TABLET ORAL
Qty: 31 TABLET | Refills: 3 | Status: SHIPPED | OUTPATIENT
Start: 2024-04-04 | End: 2024-08-29

## 2024-04-04 RX ORDER — CLONAZEPAM 1 MG/1
TABLET ORAL
Qty: 15.5 TABLET | Refills: 1 | Status: SHIPPED | OUTPATIENT
Start: 2024-04-04 | End: 2024-04-05

## 2024-04-04 RX ORDER — HYDROXYZINE PAMOATE 25 MG/1
25 CAPSULE ORAL 3 TIMES DAILY PRN
Qty: 90 CAPSULE | Refills: 3 | Status: SHIPPED | OUTPATIENT
Start: 2024-04-04 | End: 2024-09-19

## 2024-04-04 RX ORDER — CITALOPRAM HYDROBROMIDE 20 MG/1
20 TABLET ORAL DAILY
Qty: 30 TABLET | Refills: 3 | Status: SHIPPED | OUTPATIENT
Start: 2024-04-04 | End: 2024-09-05

## 2024-04-04 RX ORDER — CLONAZEPAM 0.5 MG/1
TABLET ORAL
Qty: 31 TABLET | Refills: 5 | Status: SHIPPED | OUTPATIENT
Start: 2024-04-04

## 2024-04-04 NOTE — NURSING NOTE
Is the patient currently in the state of [place]? YES    Visit mode:TELEPHONE    If the visit is dropped, the patient can be reconnected by: TELEPHONE VISIT: Phone number:   Telephone Information:   Mobile [phone]       Will anyone else be joining the visit? NO  (If patient encounters technical issues they should call [phone] :[phone])    How would you like to obtain your AVS? MyChart    Are changes needed to the allergy or medication list? Pt stated no med changes    Are refills needed on medications prescribed by this physician? NO    Reason for visit: Telephone (Follow-up )    Jyothi LOERA

## 2024-04-04 NOTE — TELEPHONE ENCOUNTER
[facility] Call Center    Phone Message    May a detailed message be left on voicemail: yes     Reason for Call: Medication Question or concern regarding medication   Prescription Clarification  Name of Medication:   clonazePAM (KLONOPIN) 0.5 MG     clonazePAM (KLONOPIN) 1 MG  Prescribing Provider: Casa Bustos MD   Pharmacy: [facility] [employer] [facility] [place] [id number] [facility]   What on the order needs clarification? Two orders came in of same medications, asking for clarification on the dosages on the orders.    Please call [phone], and ask for pharmacist.        Action Taken: Message routed to:  Other: [facility]    Travel Screening: Not Applicable

## 2024-04-04 NOTE — PROGRESS NOTES
Pertinent History: She has a long-standing history of developmental delay as well as atypical psychosis and a history of very significant behavioral dyscontrol and mood instability.  Over the winter 2018 we did increase the Remeron to 30 mg.       I saw the patient in September 2018.  At that time we increased the patient's Risperdal as well as the patient's melatonin due to some insomnia and behavioral difficulties.     I saw the patient in January 2018.  At that time we did taper off the patient's Risperdal over the next couple of weeks due to some ongoing behavioral difficulties despite that trial.  She had had a seizure in September and frequent urinary tract infections since then which may have contributed.  She was staying in bed much of the day despite sleeping well at night and was combative with redirection at that time.     I saw the patient in May 2019.  At that time we continued with the Resporal.  We were considering a change to Abilify.  Over the phone in early June given the patient's ongoing difficulties we did discontinue the Risperdal and started the patient on low-dose Abilify.       I saw the patient in July 2019.  There is been some improvement in her appetite but continued to be restless and periodically agitated and possibly hypomanic.  She had tolerated the recently started Abilify.  We did increase that to 2 mg twice a day.  Since that visit we did change the Abilify to 5 mg at night only.      I saw the patient in the winter of 2020. We did not make any medication changes at that time but did consider an increase in her psychotropic medication. Tiffany reportedly improved with her auditory hallucinations but still had some periods of restlessness and agitation although they were less intense. The primary care doctor was considering in EKG at the last visit due to the patient's medication list.     I saw the patient for a virtual visit on July 13 of 2020. She was doing fairly well at  that time and we did not make any medication changes. She continued to be followed through her medical team as well.     The patient had an appointment by phone in September 2020.  There was no significant change in the patient's presentation and she was tolerating the current treatment plan.  She still was having cyclical behavioral difficulties.  The team was working on sleep hygiene issues.  We did not make any medication changes at that visit.    I saw the patient in March 2021.  There were no significant changes at that time.  She was tolerating the medication.  We continued with the treatment plan and medical follow-up.    I saw the patient in September 2021.  The patient continues to be sensitive to her environment and changes.  Her brother did pass away from coronavirus and that was difficult for her.  She has had a change in her schedule as well.  Communication was still difficult.  There were no other significant changes and the patient continue to follow with her medical team for her seizure medication and labs.  We did not make any medication changes.    The patient was seen in June 2022.  She was doing better at that time and the staff felt because the weather was improving the patient seemed to enjoy the activities more.  Her primary doctor had changed her Ambien to as needed and she was not sleeping as well and the staff was hoping to have it scheduled again which I did.  Otherwise, we did not make any changes and the patient was stable.    The patient was seen for follow-up in October 2023.  She was doing fairly well at that time but still having variable sleep at around 5 to 8 hours per night.  Cognitively there was no change.  She was tolerating the medication.  We elected to continue with the treatment plan and did not make any changes at that time.    The patient was seen for follow-up in May 2023.  She had moved to a new group home and after some initial adjustment he was doing well.  She is  tolerating the medication.  We elected to make no medication changes at that time.     Patient was seen for follow-up in September 2023.  She continued with occasional outbursts but no change in cognitive deficits.  She seemed to be tolerating the medication.  Did not make any changes.      Current Symptoms:   The patient returned for follow-up today.  Patient again was unable participate.  With the patient's caregiver.  She states sleep continues to be concerned that she was vague on specifics.  She apparently sleeping about 5 to 6 hours a night but that throughout the day.  He spent some time talking about sleep hygiene.  No change in her cognitive communication deficits.  No obvious psychosis.  No other new medical issues or concerns.  She apparently was treated for some type of skin cancer and that was stable.  No obvious side effects to the medication.  We again discussed treatment options and I suggested that they keep track of her sleeping patterns and try and keep her up during the day and he stated he would try and do that.        Current Medications: Please see chart. Medications personally reviewed.     Medication Compliance: YES     Side Effects to Medications:  NO        Vitals:      Wt Readings from Last 3 Encounters:   10/14/19 134 lb (60.8 kg)   07/15/19 130 lb (59 kg)   04/08/19 156 lb (70.8 kg)          Temp Readings from Last 3 Encounters:   01/24/19 97.8  F (36.6  C) (Oral)   01/22/19 98.8  F (37.1  C) (Axillary)   01/06/15 97.6  F (36.4  C) (Axillary)          BP Readings from Last 3 Encounters:   07/15/19 (!) 128/96   01/24/19 136/66   01/22/19 115/86          Pulse Readings from Last 3 Encounters:   07/15/19 (!) 57   01/24/19 85   01/22/19 84            Mental Status Exam:   Appearance: Staff were interviewed by the phone.  The patient was unable to participate.  Staff reported no significant changes.  Behavior:  Patient is unable to participate today.  She continues with occasional outbursts.   It is difficult to determine specific patterns or triggers.  Speech: Nonverbal.  Continues impaired and unable to participate.  Mood/Affect: Typically calm but occasionally gets frustrated.  No evidence of any bridger.  Thought Content:   The staff do not believe there is been any psychosis.  This is difficult to assess.  No significant change noted.  Suicidal or Homicidal Thoughts: No reports of any suicidal or homicidal ideation..  Thought Process/Formulation:  Slow and concrete.  Not able to participate.  No obvious change.  Associations:   Not able to participate.  No recent change according to the staff.  Fund of Knowledge:  Not able to participate. No reports of any recent significant change.  Attention/Concentration: Severely impaired and unchanged.  Insight:  Severely impaired.  No recent change.  Judgement:  Severely impaired.  No recent change.  Memory:  Severely impaired. No participation.   Motor Status:  Currently not able to participate. No tremor Reported by staff.   Orientation: Not able to participate.  No reports of any significant change.  Continues impaired by report.  Not able to demonstrate orientation.  No change.     Diagnosis managed and treated at today's visit :     Major neurocognitive disorder disorder, NOS with resultant mood disturbance and behavioral dyscontrol.  Atypical psychosis    Developmental delay, by history      Plan:  Medication Adjustment:  I will make no changes.     Staff interview, chart review and documentation was 22 minutes.  The patient and staff were interviewed by phone through [employer].     Other:   The patient will return to this clinic in 3-4 months for medication check.  The staff agreed to call before then with any questions or concerns.     Continue with the support of the clinic, reassurance, and redirection. Staff monitoring and ongoing assessments per team plan. Current psychotropic medication appears to represent the minimum effective dosage and appears  medically necessary. We will continue to monitor and reassess. This team will utilize appropriate emergency services if necessary. I will make myself available if concerns or problems arise.     Casa Bustos MD     Virtual Visit Details    Type of service:  Telephone Visit   Phone call duration: 22 minutes

## 2024-04-04 NOTE — PATIENT INSTRUCTIONS
No obvious significant change with the patient was unable to participate.  Staff believes there may be sleep issues but were unable to quantify this.  We discussed sleep hygiene and sleep monitoring and a tracking system.  I will make no medication changes at this time and the patient should return to clinic in 3 months for medication check.

## 2024-04-04 NOTE — LETTER
4/4/2024         RE: Michell Mccann  [address] [facility] [address]        Dear Colleague,    Thank you for referring your patient, Michell Mccann, to the [facility]. Please see a copy of my visit note below.          Pertinent History: She has a long-standing history of developmental delay as well as atypical psychosis and a history of very significant behavioral dyscontrol and mood instability.  Over the winter 2018 we did increase the Remeron to 30 mg.       I saw the patient in September 2018.  At that time we increased the patient's Risperdal as well as the patient's melatonin due to some insomnia and behavioral difficulties.     I saw the patient in January 2018.  At that time we did taper off the patient's Risperdal over the next couple of weeks due to some ongoing behavioral difficulties despite that trial.  She had had a seizure in September and frequent urinary tract infections since then which may have contributed.  She was staying in bed much of the day despite sleeping well at night and was combative with redirection at that time.     I saw the patient in May 2019.  At that time we continued with the Resporal.  We were considering a change to Abilify.  Over the phone in early June given the patient's ongoing difficulties we did discontinue the Risperdal and started the patient on low-dose Abilify.       I saw the patient in July 2019.  There is been some improvement in her appetite but continued to be restless and periodically agitated and possibly hypomanic.  She had tolerated the recently started Abilify.  We did increase that to 2 mg twice a day.  Since that visit we did change the Abilify to 5 mg at night only.      I saw the patient in the winter of 2020. We did not make any medication changes at that time but did consider an increase in her psychotropic medication. Tiffany reportedly improved with her auditory hallucinations but still had some  periods of restlessness and agitation although they were less intense. The primary care doctor was considering in EKG at the last visit due to the patient's medication list.     I saw the patient for a virtual visit on July 13 of 2020. She was doing fairly well at that time and we did not make any medication changes. She continued to be followed through her medical team as well.     The patient had an appointment by phone in September 2020.  There was no significant change in the patient's presentation and she was tolerating the current treatment plan.  She still was having cyclical behavioral difficulties.  The team was working on sleep hygiene issues.  We did not make any medication changes at that visit.    I saw the patient in March 2021.  There were no significant changes at that time.  She was tolerating the medication.  We continued with the treatment plan and medical follow-up.    I saw the patient in September 2021.  The patient continues to be sensitive to her environment and changes.  Her brother did pass away from coronavirus and that was difficult for her.  She has had a change in her schedule as well.  Communication was still difficult.  There were no other significant changes and the patient continue to follow with her medical team for her seizure medication and labs.  We did not make any medication changes.    The patient was seen in June 2022.  She was doing better at that time and the staff felt because the weather was improving the patient seemed to enjoy the activities more.  Her primary doctor had changed her Ambien to as needed and she was not sleeping as well and the staff was hoping to have it scheduled again which I did.  Otherwise, we did not make any changes and the patient was stable.    The patient was seen for follow-up in October 2023.  She was doing fairly well at that time but still having variable sleep at around 5 to 8 hours per night.  Cognitively there was no change.  She was  tolerating the medication.  We elected to continue with the treatment plan and did not make any changes at that time.    The patient was seen for follow-up in May 2023.  She had moved to a new group home and after some initial adjustment he was doing well.  She is tolerating the medication.  We elected to make no medication changes at that time.     Patient was seen for follow-up in September 2023.  She continued with occasional outbursts but no change in cognitive deficits.  She seemed to be tolerating the medication.  Did not make any changes.      Current Symptoms:   The patient returned for follow-up today.  Patient again was unable participate.  With the patient's caregiver.  She states sleep continues to be concerned that she was vague on specifics.  She apparently sleeping about 5 to 6 hours a night but that throughout the day.  He spent some time talking about sleep hygiene.  No change in her cognitive communication deficits.  No obvious psychosis.  No other new medical issues or concerns.  She apparently was treated for some type of skin cancer and that was stable.  No obvious side effects to the medication.  We again discussed treatment options and I suggested that they keep track of her sleeping patterns and try and keep her up during the day and he stated he would try and do that.        Current Medications: Please see chart. Medications personally reviewed.     Medication Compliance: YES     Side Effects to Medications:  NO        Vitals:      Wt Readings from Last 3 Encounters:   10/14/19 134 lb (60.8 kg)   07/15/19 130 lb (59 kg)   04/08/19 156 lb (70.8 kg)          Temp Readings from Last 3 Encounters:   01/24/19 97.8  F (36.6  C) (Oral)   01/22/19 98.8  F (37.1  C) (Axillary)   01/06/15 97.6  F (36.4  C) (Axillary)          BP Readings from Last 3 Encounters:   07/15/19 (!) 128/96   01/24/19 136/66   01/22/19 115/86          Pulse Readings from Last 3 Encounters:   07/15/19 (!) 57   01/24/19 85    01/22/19 84            Mental Status Exam:   Appearance: Staff were interviewed by the phone.  The patient was unable to participate.  Staff reported no significant changes.  Behavior:  Patient is unable to participate today.  She continues with occasional outbursts.  It is difficult to determine specific patterns or triggers.  Speech: Nonverbal.  Continues impaired and unable to participate.  Mood/Affect: Typically calm but occasionally gets frustrated.  No evidence of any bridger.  Thought Content:   The staff do not believe there is been any psychosis.  This is difficult to assess.  No significant change noted.  Suicidal or Homicidal Thoughts: No reports of any suicidal or homicidal ideation..  Thought Process/Formulation:  Slow and concrete.  Not able to participate.  No obvious change.  Associations:   Not able to participate.  No recent change according to the staff.  Fund of Knowledge:  Not able to participate. No reports of any recent significant change.  Attention/Concentration: Severely impaired and unchanged.  Insight:  Severely impaired.  No recent change.  Judgement:  Severely impaired.  No recent change.  Memory:  Severely impaired. No participation.   Motor Status:  Currently not able to participate. No tremor Reported by staff.   Orientation: Not able to participate.  No reports of any significant change.  Continues impaired by report.  Not able to demonstrate orientation.  No change.     Diagnosis managed and treated at today's visit :     Major neurocognitive disorder disorder, NOS with resultant mood disturbance and behavioral dyscontrol.  Atypical psychosis    Developmental delay, by history      Plan:  Medication Adjustment:  I will make no changes.     Staff interview, chart review and documentation was 22 minutes.  The patient and staff were interviewed by phone through [employer].     Other:   The patient will return to this clinic in 3-4 months for medication check.  The staff agreed to call  before then with any questions or concerns.     Continue with the support of the clinic, reassurance, and redirection. Staff monitoring and ongoing assessments per team plan. Current psychotropic medication appears to represent the minimum effective dosage and appears medically necessary. We will continue to monitor and reassess. This team will utilize appropriate emergency services if necessary. I will make myself available if concerns or problems arise.     Casa Bustos MD     Virtual Visit Details    Type of service:  Telephone Visit   Phone call duration: 22 minutes      Again, thank you for allowing me to participate in the care of your patient.        Sincerely,        Casa Bustos MD

## 2024-04-10 ENCOUNTER — TELEPHONE (OUTPATIENT)
Dept: NEUROLOGY | Facility: CLINIC | Age: 64
End: 2024-04-10
Payer: MEDICARE

## 2024-04-10 NOTE — TELEPHONE ENCOUNTER
[facility] Call Center    Phone Message    May a detailed message be left on voicemail: yes     Reason for Call:     Daniela patients niece and legal guardian called to speak to nurse, per pt's group home staff they were denied AVS from clinic on pts last visit. Group home staff was informed that clinic does not have consent to give out pt's AVS. Per Daniela patient is non communicative, group home staff are the ones to communicate with her provider regarding her medical. Is there anything that Daniela needs to sign or fill out to make sure that group home staff can and will continue to receive requested records/ AVS when at the clinic? Daniela was under the impression that pt's group home staff is listed on pt's consent form. Please call Daniela back to discuss further.     [phone]    Action Taken: Other: [facility]    Travel Screening: Not Applicable

## 2024-04-11 NOTE — TELEPHONE ENCOUNTER
[facility] Call Center    Phone Message    May a detailed message be left on voicemail: yes     Reason for Call: Pt's nice and legal guardian Daniela is requesting that the Pt's after visit summary from last visit be faxed to [phone] Attn: Santiago.    Any questions please reach out to Daniela at [phone].  Action Taken: Message routed to:  Other: [facility]     Travel Screening: Not Applicable

## 2024-04-11 NOTE — TELEPHONE ENCOUNTER
Per Stephan Daniela (pt's niece) is the guardian for the pt. Document scanned in the pt's chart under media.    I left a message for Daniela that we can provide her the AVS from the last visit and asked her to call back to let us know if she wants the AVS to be mailed, faxed, or to be  in clinic.    JAYDEN Maolne on 4/11/2024 at 9:47 AM

## 2024-08-28 ENCOUNTER — TELEPHONE (OUTPATIENT)
Dept: NEUROLOGY | Facility: CLINIC | Age: 64
End: 2024-08-28
Payer: MEDICARE

## 2024-08-28 DIAGNOSIS — F39 MOOD DISORDER (H): ICD-10-CM

## 2024-08-28 NOTE — TELEPHONE ENCOUNTER
Refill request for the following medication (s) listed below.    Pending Prescriptions:                       Disp   Refills    zolpidem (AMBIEN) 10 MG tablet [Pharmacy *31 tab*11           Sig: TAKE 1 TABLET BY MOUTH AT BEDTIME  *4 TOTAL           FILLS*      Last office visit provider:  04/04/24  Next appointment scheduled: None. Msg sent to  to call pt for an appt.        Medication T'd for review and signature  Sadie Aly MA on 8/28/2024 at 2:43 PM

## 2024-08-28 NOTE — TELEPHONE ENCOUNTER
Hello,    Please call pt to schedule for next available follow up visit with Dr. Bustos.    Thank you,  Sadie Aly MA on 8/28/2024 at 2:42 PM

## 2024-08-29 RX ORDER — ZOLPIDEM TARTRATE 10 MG/1
TABLET ORAL
Qty: 31 TABLET | Refills: 0 | Status: SHIPPED | OUTPATIENT
Start: 2024-08-29 | End: 2024-09-19

## 2024-09-03 DIAGNOSIS — F39 MOOD DISORDER (H): ICD-10-CM

## 2024-09-03 DIAGNOSIS — F06.30 MOOD DISORDER IN CONDITIONS CLASSIFIED ELSEWHERE: ICD-10-CM

## 2024-09-03 NOTE — TELEPHONE ENCOUNTER
NEXT APPT  With Neurology (Casa Bustos MD)  09/19/2024 at 12:00 PM      Sadie Aly MA on 9/3/2024 at 11:56 AM

## 2024-09-04 NOTE — TELEPHONE ENCOUNTER
Refill request for the following medication (s) listed below.    Pending Prescriptions:                       Disp   Refills    citalopram (CELEXA) 20 MG tablet [Pharmac*30 tab*11           Sig: TAKE 1 TABLET BY MOUTH ONCE DAILY ALONG WITH 40MG           FOR TOTAL DOSE = 60MG    mirtazapine (REMERON) 30 MG tablet [Pharm*31 tab*11           Sig: TAKE 1 TABLET BY MOUTH AT BEDTIME      Last office visit provider:  4/4/24  Next appointment scheduled: 9/19/24      Medication T'd for review and signature  Vj HUFF ATC on 9/4/2024 at 10:14 AM

## 2024-09-05 RX ORDER — CITALOPRAM HYDROBROMIDE 20 MG/1
TABLET ORAL
Qty: 30 TABLET | Refills: 11 | Status: SHIPPED | OUTPATIENT
Start: 2024-09-05 | End: 2024-09-19

## 2024-09-05 RX ORDER — MIRTAZAPINE 30 MG/1
30 TABLET, FILM COATED ORAL AT BEDTIME
Qty: 31 TABLET | Refills: 11 | Status: SHIPPED | OUTPATIENT
Start: 2024-09-05 | End: 2024-09-19

## 2024-09-19 ENCOUNTER — VIRTUAL VISIT (OUTPATIENT)
Dept: NEUROLOGY | Facility: CLINIC | Age: 64
End: 2024-09-19
Payer: MEDICARE

## 2024-09-19 DIAGNOSIS — F06.30 MOOD DISORDER IN CONDITIONS CLASSIFIED ELSEWHERE: ICD-10-CM

## 2024-09-19 DIAGNOSIS — F39 MOOD DISORDER (H): ICD-10-CM

## 2024-09-19 PROCEDURE — 99443 PR PHYSICIAN TELEPHONE EVALUATION 21-30 MIN: CPT | Mod: 93 | Performed by: PSYCHIATRY & NEUROLOGY

## 2024-09-19 RX ORDER — ZOLPIDEM TARTRATE 10 MG/1
TABLET ORAL
Qty: 31 TABLET | Refills: 0 | Status: SHIPPED | OUTPATIENT
Start: 2024-09-19

## 2024-09-19 RX ORDER — CITALOPRAM HYDROBROMIDE 40 MG/1
40 TABLET ORAL DAILY
Qty: 30 TABLET | Refills: 5 | Status: SHIPPED | OUTPATIENT
Start: 2024-09-19

## 2024-09-19 RX ORDER — MIRTAZAPINE 30 MG/1
30 TABLET, FILM COATED ORAL AT BEDTIME
Qty: 31 TABLET | Refills: 5 | Status: SHIPPED | OUTPATIENT
Start: 2024-09-19

## 2024-09-19 RX ORDER — ARIPIPRAZOLE 5 MG/1
5 TABLET ORAL AT BEDTIME
Qty: 31 TABLET | Refills: 5 | Status: SHIPPED | OUTPATIENT
Start: 2024-09-19

## 2024-09-19 ASSESSMENT — PAIN SCALES - GENERAL: PAINLEVEL: NO PAIN (0)

## 2024-09-19 NOTE — PATIENT INSTRUCTIONS
The patient's primary care doctor apparently increased the Klonopin from 0.5 to 1 mg at night recently.  The patient will continue with the Celexa but I will decrease from 60 mg down to 40 mg.  The patient will continue on Remeron 30 mg.  The patient will continue with neurology and her medical team.  PDMP was reviewed.  I did inform them that I was going to be leaving this clinic.

## 2024-09-19 NOTE — NURSING NOTE
Current patient location: [address] [place] [id number]    Is the patient currently in the state of [place]? YES    Visit mode:TELEPHONE    If the visit is dropped, the patient can be reconnected by: TELEPHONE VISIT: Phone number: [phone]    Will anyone else be joining the visit? NO  (If patient encounters technical issues they should call [phone] :[phone])    How would you like to obtain your AVS? MyChart    Are changes needed to the allergy or medication list? Yes discontinue Hydroxyzine     Are refills needed on medications prescribed by this physician? NO    Rooming Documentation:  Staff checked pt in       Reason for visit: Follow Up    Addis LOERA

## 2024-09-19 NOTE — PROGRESS NOTES
Virtual Visit Details    Type of service:  Telephone Visit   Phone call duration: 22 minutes   Originating Location (pt. Location): The patient's home  Distant Location (provider location): My home office    Please give staff fax number for paperwork to be sent to clinic.           Pertinent History: She has a long-standing history of developmental delay as well as atypical psychosis and a history of very significant behavioral dyscontrol and mood instability.  Over the winter 2018 we did increase the Remeron to 30 mg.       I saw the patient in September 2018.  At that time we increased the patient's Risperdal as well as the patient's melatonin due to some insomnia and behavioral difficulties.     I saw the patient in January 2018.  At that time we did taper off the patient's Risperdal over the next couple of weeks due to some ongoing behavioral difficulties despite that trial.  She had had a seizure in September and frequent urinary tract infections since then which may have contributed.  She was staying in bed much of the day despite sleeping well at night and was combative with redirection at that time.     I saw the patient in May 2019.  At that time we continued with the Resporal.  We were considering a change to Abilify.  Over the phone in early June given the patient's ongoing difficulties we did discontinue the Risperdal and started the patient on low-dose Abilify.       I saw the patient in July 2019.  There is been some improvement in her appetite but continued to be restless and periodically agitated and possibly hypomanic.  She had tolerated the recently started Abilify.  We did increase that to 2 mg twice a day.  Since that visit we did change the Abilify to 5 mg at night only.      I saw the patient in the winter of 2020. We did not make any medication changes at that time but did consider an increase in her psychotropic medication. Tiffany reportedly improved with her auditory hallucinations but still  had some periods of restlessness and agitation although they were less intense. The primary care doctor was considering in EKG at the last visit due to the patient's medication list.     I saw the patient for a virtual visit on July 13 of 2020. She was doing fairly well at that time and we did not make any medication changes. She continued to be followed through her medical team as well.     The patient had an appointment by phone in September 2020.  There was no significant change in the patient's presentation and she was tolerating the current treatment plan.  She still was having cyclical behavioral difficulties.  The team was working on sleep hygiene issues.  We did not make any medication changes at that visit.    I saw the patient in March 2021.  There were no significant changes at that time.  She was tolerating the medication.  We continued with the treatment plan and medical follow-up.    I saw the patient in September 2021.  The patient continues to be sensitive to her environment and changes.  Her brother did pass away from coronavirus and that was difficult for her.  She has had a change in her schedule as well.  Communication was still difficult.  There were no other significant changes and the patient continue to follow with her medical team for her seizure medication and labs.  We did not make any medication changes.    The patient was seen in June 2022.  She was doing better at that time and the staff felt because the weather was improving the patient seemed to enjoy the activities more.  Her primary doctor had changed her Ambien to as needed and she was not sleeping as well and the staff was hoping to have it scheduled again which I did.  Otherwise, we did not make any changes and the patient was stable.    The patient was seen for follow-up in October 2023.  She was doing fairly well at that time but still having variable sleep at around 5 to 8 hours per night.  Cognitively there was no change.   She was tolerating the medication.  We elected to continue with the treatment plan and did not make any changes at that time.    The patient was seen for follow-up in May 2023.  She had moved to a new group home and after some initial adjustment he was doing well.  She is tolerating the medication.  We elected to make no medication changes at that time.     Patient was seen for follow-up in September 2023.  She continued with occasional outbursts but no change in cognitive deficits.  She seemed to be tolerating the medication.  Did not make any changes.    The patient was seen for follow-up in April 2024.  She was doing relatively well at that time and tolerating the medication.  We did not make any medication changes.  Cognitive deficits and behaviors were unchanged.      Current Symptoms:   The patient was unable to participate but I was able to talk with a staff member who stated that the patient continues to have periodic verbal outbursts and is difficult to redirect during those times.  Apparently she has been following with her primary care doctor who recently increased the Klonopin to 1 mg at night but I do not see that in this chart.  She will continue to follow up with that team for the Klonopin.  There is been no hallucinations or delusions.  No bridger.  No new medical issues or concerns.  No change in sleep or appetite and no change in her cognitive deficits.  I did talk with the staff about potential medication changes and I did elect to decrease the Celexa from 60 down to 40 mg a day.  I did inform them that I would no longer be available at this clinic.        Current Medications: Please see chart. Medications personally reviewed.     Medication Compliance: YES     Side Effects to Medications:  NO            Mental Status Exam:   Appearance:  Staff reported no significant changes.  Behavior: The patient was unable to participate again.  She continues with periodic verbal outbursts.  It is difficult to  determine specific patterns or triggers.  Speech: Nonverbal.  No change per staff.  Mood/Affect: Not obviously depressed or manic but does have periods of frustration and anger outbursts.  Thought Content:   The staff do not believe there is been any psychosis.  This is difficult to assess.  No significant change noted.  Suicidal or Homicidal Thoughts: No reports of any suicidal or homicidal ideation..  Thought Process/Formulation:  Slow and concrete.  Not able to participate.  No obvious change.  Associations:   Not able to participate.  No recent change according to the staff.  Fund of Knowledge:  Not able to participate. No reports of any recent significant change.  Attention/Concentration: Severely impaired and unchanged.  Insight:  Severely impaired.  No recent change.  Judgement:  Severely impaired.  No recent change.  Memory:  Severely impaired. No participation.   Motor Status:  Currently not able to participate. No tremor Reported by staff.   Orientation: Not able to participate.  No reports of any significant change.  Continues impaired by report.  Not able to demonstrate orientation.  No change.     Diagnosis managed and treated at today's visit :     Major neurocognitive disorder disorder, NOS with resultant mood disturbance and behavioral dyscontrol.  Atypical psychosis    Developmental delay, by history      Plan:  Medication Adjustment:  The patient's primary care doctor apparently increased the Klonopin from 0.5 to 1 mg at night recently.  The patient will continue with the Celexa but I will decrease from 60 mg down to 40 mg.  The patient will continue on Remeron 30 mg.  The patient will continue with neurology and her medical team.  PDMP was reviewed.  I did inform them that I was going to be leaving this clinic.     Continue with the support of the clinic, reassurance, and redirection. Staff monitoring and ongoing assessments per team plan. Current psychotropic medication appears to represent the  minimum effective dosage and appears medically necessary. We will continue to monitor and reassess. This team will utilize appropriate emergency services if necessary. I will make myself available if concerns or problems arise.     Casa Bustos MD

## 2024-09-19 NOTE — LETTER
9/19/2024      Michell Mccann  [address] [facility] [address]      Dear Colleague,    Thank you for referring your patient, Michell Mccann, to the [facility]. Please see a copy of my visit note below.    Virtual Visit Details    Type of service:  Telephone Visit   Phone call duration: 22 minutes   Originating Location (pt. Location): The patient's home  Distant Location (provider location): My home office    Please give staff fax number for paperwork to be sent to clinic.           Pertinent History: She has a long-standing history of developmental delay as well as atypical psychosis and a history of very significant behavioral dyscontrol and mood instability.  Over the winter 2018 we did increase the Remeron to 30 mg.       I saw the patient in September 2018.  At that time we increased the patient's Risperdal as well as the patient's melatonin due to some insomnia and behavioral difficulties.     I saw the patient in January 2018.  At that time we did taper off the patient's Risperdal over the next couple of weeks due to some ongoing behavioral difficulties despite that trial.  She had had a seizure in September and frequent urinary tract infections since then which may have contributed.  She was staying in bed much of the day despite sleeping well at night and was combative with redirection at that time.     I saw the patient in May 2019.  At that time we continued with the Resporal.  We were considering a change to Abilify.  Over the phone in early June given the patient's ongoing difficulties we did discontinue the Risperdal and started the patient on low-dose Abilify.       I saw the patient in July 2019.  There is been some improvement in her appetite but continued to be restless and periodically agitated and possibly hypomanic.  She had tolerated the recently started Abilify.  We did increase that to 2 mg twice a day.  Since that visit we did change the  Abilify to 5 mg at night only.      I saw the patient in the winter of 2020. We did not make any medication changes at that time but did consider an increase in her psychotropic medication. Tiffany reportedly improved with her auditory hallucinations but still had some periods of restlessness and agitation although they were less intense. The primary care doctor was considering in EKG at the last visit due to the patient's medication list.     I saw the patient for a virtual visit on July 13 of 2020. She was doing fairly well at that time and we did not make any medication changes. She continued to be followed through her medical team as well.     The patient had an appointment by phone in September 2020.  There was no significant change in the patient's presentation and she was tolerating the current treatment plan.  She still was having cyclical behavioral difficulties.  The team was working on sleep hygiene issues.  We did not make any medication changes at that visit.    I saw the patient in March 2021.  There were no significant changes at that time.  She was tolerating the medication.  We continued with the treatment plan and medical follow-up.    I saw the patient in September 2021.  The patient continues to be sensitive to her environment and changes.  Her brother did pass away from coronavirus and that was difficult for her.  She has had a change in her schedule as well.  Communication was still difficult.  There were no other significant changes and the patient continue to follow with her medical team for her seizure medication and labs.  We did not make any medication changes.    The patient was seen in June 2022.  She was doing better at that time and the staff felt because the weather was improving the patient seemed to enjoy the activities more.  Her primary doctor had changed her Ambien to as needed and she was not sleeping as well and the staff was hoping to have it scheduled again which I did.   Otherwise, we did not make any changes and the patient was stable.    The patient was seen for follow-up in October 2023.  She was doing fairly well at that time but still having variable sleep at around 5 to 8 hours per night.  Cognitively there was no change.  She was tolerating the medication.  We elected to continue with the treatment plan and did not make any changes at that time.    The patient was seen for follow-up in May 2023.  She had moved to a new group home and after some initial adjustment he was doing well.  She is tolerating the medication.  We elected to make no medication changes at that time.     Patient was seen for follow-up in September 2023.  She continued with occasional outbursts but no change in cognitive deficits.  She seemed to be tolerating the medication.  Did not make any changes.    The patient was seen for follow-up in April 2024.  She was doing relatively well at that time and tolerating the medication.  We did not make any medication changes.  Cognitive deficits and behaviors were unchanged.      Current Symptoms:   The patient was unable to participate but I was able to talk with a staff member who stated that the patient continues to have periodic verbal outbursts and is difficult to redirect during those times.  Apparently she has been following with her primary care doctor who recently increased the Klonopin to 1 mg at night but I do not see that in this chart.  She will continue to follow up with that team for the Klonopin.  There is been no hallucinations or delusions.  No bridger.  No new medical issues or concerns.  No change in sleep or appetite and no change in her cognitive deficits.  I did talk with the staff about potential medication changes and I did elect to decrease the Celexa from 60 down to 40 mg a day.  I did inform them that I would no longer be available at this clinic.        Current Medications: Please see chart. Medications personally reviewed.     Medication  Compliance: YES     Side Effects to Medications:  NO            Mental Status Exam:   Appearance:  Staff reported no significant changes.  Behavior: The patient was unable to participate again.  She continues with periodic verbal outbursts.  It is difficult to determine specific patterns or triggers.  Speech: Nonverbal.  No change per staff.  Mood/Affect: Not obviously depressed or manic but does have periods of frustration and anger outbursts.  Thought Content:   The staff do not believe there is been any psychosis.  This is difficult to assess.  No significant change noted.  Suicidal or Homicidal Thoughts: No reports of any suicidal or homicidal ideation..  Thought Process/Formulation:  Slow and concrete.  Not able to participate.  No obvious change.  Associations:   Not able to participate.  No recent change according to the staff.  Fund of Knowledge:  Not able to participate. No reports of any recent significant change.  Attention/Concentration: Severely impaired and unchanged.  Insight:  Severely impaired.  No recent change.  Judgement:  Severely impaired.  No recent change.  Memory:  Severely impaired. No participation.   Motor Status:  Currently not able to participate. No tremor Reported by staff.   Orientation: Not able to participate.  No reports of any significant change.  Continues impaired by report.  Not able to demonstrate orientation.  No change.     Diagnosis managed and treated at today's visit :     Major neurocognitive disorder disorder, NOS with resultant mood disturbance and behavioral dyscontrol.  Atypical psychosis    Developmental delay, by history      Plan:  Medication Adjustment:  The patient's primary care doctor apparently increased the Klonopin from 0.5 to 1 mg at night recently.  The patient will continue with the Celexa but I will decrease from 60 mg down to 40 mg.  The patient will continue on Remeron 30 mg.  The patient will continue with neurology and her medical team.  PDMP was  reviewed.  I did inform them that I was going to be leaving this clinic.     Continue with the support of the clinic, reassurance, and redirection. Staff monitoring and ongoing assessments per team plan. Current psychotropic medication appears to represent the minimum effective dosage and appears medically necessary. We will continue to monitor and reassess. This team will utilize appropriate emergency services if necessary. I will make myself available if concerns or problems arise.     Casa Bustos MD      Again, thank you for allowing me to participate in the care of your patient.        Sincerely,        Casa Bustos MD

## 2024-10-07 ENCOUNTER — TELEPHONE (OUTPATIENT)
Dept: NEUROLOGY | Facility: CLINIC | Age: 64
End: 2024-10-07
Payer: MEDICARE

## 2024-10-07 NOTE — TELEPHONE ENCOUNTER
Health Call Center    Phone Message    May a detailed message be left on voicemail: no     Reason for Call: Other: Canadce from pt's group home calls in requesting a call back from provider or care team stating she believes he discontinued pt's medications without talking to them. She is also requesting the AVS from pt's 9/19/24 visit be provided to them. She also states she is having staff from the group home drop off paperwork at the [place] location for provider to fill out & return to her.     Candace can be reached at [phone] to discuss the above.    Action Taken: Message routed to:  Other: [facility]    Travel Screening: Not Applicable

## 2024-10-08 NOTE — TELEPHONE ENCOUNTER
AVS was FAXED [phone].    Will have the nurse call in regards to meds questions.        JAYDEN Malone on 10/8/2024 at 2:25 PM

## 2024-10-09 NOTE — TELEPHONE ENCOUNTER
When paperwork arrives I will place in Dr. Bustos's folder to review and sign.    Vj Cuevas LAT ATC on 10/9/2024 at 3:43 PM

## 2024-10-09 NOTE — TELEPHONE ENCOUNTER
RN returned call to Candace, she did not receive AVS, reports that it needs to be faxed to [phone]. RN refaxed AVS.     She has paperwork that provider needs to sign, RN provided clinic fax number. Vj can you please watch for paperwork and if appropriate have MD sign on Tuesday.     Stephan Iyer RN, BSN  [facility]

## 2024-10-28 DIAGNOSIS — F39 MOOD DISORDER (H): ICD-10-CM

## 2024-10-31 RX ORDER — ZOLPIDEM TARTRATE 10 MG/1
TABLET ORAL
Qty: 31 TABLET | Refills: 11 | OUTPATIENT
Start: 2024-10-31

## 2024-10-31 NOTE — TELEPHONE ENCOUNTER
Refused Prescriptions:                       Disp   Refills    zolpidem (AMBIEN) 10 MG tablet [Pharmacy M*31 tab*11       Sig: TAKE 1 TABLET BY MOUTH AT BEDTIME  *1 TOTAL FILL*  Refused By: KHUSHBOO HERNANDEZ  Reason for Refusal: Patient no longer under provider care    No longer under provider care as of 10/15. Previously sent letter with care options for future.     Khushboo OTERO RN, BSN  [facility]

## 2024-11-19 DIAGNOSIS — F39 MOOD DISORDER (H): ICD-10-CM

## 2024-11-20 RX ORDER — ZOLPIDEM TARTRATE 10 MG/1
TABLET ORAL
Qty: 31 TABLET | Refills: 11 | OUTPATIENT
Start: 2024-11-20

## 2024-11-20 NOTE — TELEPHONE ENCOUNTER
Provider no longer with [facility] system as of 10/15/2024. Pt previously given letter disclosing that refills would not be provided after 10/15. Letter also included direction for future follow up outside of the [facility] for future care and refills.     Refill refused as pt no longer under provider care.     Stephan OTERO RN, BSN  [facility]

## 2025-01-17 ENCOUNTER — APPOINTMENT (OUTPATIENT)
Dept: RADIOLOGY | Facility: HOSPITAL | Age: 65
End: 2025-01-17
Payer: MEDICARE

## 2025-01-17 ENCOUNTER — HOSPITAL ENCOUNTER (EMERGENCY)
Facility: HOSPITAL | Age: 65
Discharge: HOME | End: 2025-01-17
Attending: EMERGENCY MEDICINE | Admitting: EMERGENCY MEDICINE
Payer: MEDICARE

## 2025-01-17 VITALS
WEIGHT: 140 LBS | BODY MASS INDEX: 22.5 KG/M2 | DIASTOLIC BLOOD PRESSURE: 79 MMHG | HEIGHT: 66 IN | TEMPERATURE: 97 F | RESPIRATION RATE: 12 BRPM | SYSTOLIC BLOOD PRESSURE: 164 MMHG | HEART RATE: 70 BPM | OXYGEN SATURATION: 96 %

## 2025-01-17 DIAGNOSIS — S20.212A SUPERFICIAL BRUISING OF CHEST WALL, LEFT, INITIAL ENCOUNTER: ICD-10-CM

## 2025-01-17 PROCEDURE — 73000 X-RAY EXAM OF COLLAR BONE: CPT | Mod: LT

## 2025-01-17 PROCEDURE — 250N000013 HC RX MED GY IP 250 OP 250 PS 637

## 2025-01-17 PROCEDURE — 73030 X-RAY EXAM OF SHOULDER: CPT | Mod: LT

## 2025-01-17 PROCEDURE — 99284 EMERGENCY DEPT VISIT MOD MDM: CPT | Mod: 25 | Performed by: EMERGENCY MEDICINE

## 2025-01-17 PROCEDURE — 71046 X-RAY EXAM CHEST 2 VIEWS: CPT

## 2025-01-17 RX ORDER — LORAZEPAM 0.5 MG/1
1 TABLET ORAL ONCE
Status: COMPLETED | OUTPATIENT
Start: 2025-01-17 | End: 2025-01-17

## 2025-01-17 RX ORDER — ACETAMINOPHEN 325 MG/1
975 TABLET ORAL ONCE
Status: COMPLETED | OUTPATIENT
Start: 2025-01-17 | End: 2025-01-17

## 2025-01-17 RX ORDER — LIDOCAINE 40 MG/G
CREAM TOPICAL
Status: DISCONTINUED | OUTPATIENT
Start: 2025-01-17 | End: 2025-01-17 | Stop reason: HOSPADM

## 2025-01-17 RX ADMIN — LORAZEPAM 1 MG: 0.5 TABLET ORAL at 15:28

## 2025-01-17 RX ADMIN — ACETAMINOPHEN 975 MG: 325 TABLET ORAL at 17:12

## 2025-01-17 ASSESSMENT — COLUMBIA-SUICIDE SEVERITY RATING SCALE - C-SSRS
6. HAVE YOU EVER DONE ANYTHING, STARTED TO DO ANYTHING, OR PREPARED TO DO ANYTHING TO END YOUR LIFE?: NO
2. HAVE YOU ACTUALLY HAD ANY THOUGHTS OF KILLING YOURSELF IN THE PAST MONTH?: NO
1. IN THE PAST MONTH, HAVE YOU WISHED YOU WERE DEAD OR WISHED YOU COULD GO TO SLEEP AND NOT WAKE UP?: NO

## 2025-01-17 ASSESSMENT — ACTIVITIES OF DAILY LIVING (ADL)
ADLS_ACUITY_SCORE: 41
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
## 2025-01-17 NOTE — DISCHARGE INSTRUCTIONS
The x-rays of the shoulder, clavicle, chest all is normal without any acute fractures.  Will plan to get you home taking Tylenol as needed for pain.  Return to the ER for any recurrence of falls or bruising.      I reviewed the records.  It looks like there is only been 2 visits for bruising and the rest of them for falls or rashes.    Come back to the ER for any new concerns.    In the future, if you notice any new rashes or skin lesions or bruising on any laying, please make sure to report this to the  or another member of the team.  All skin changes and bruising should be documented.  This way, we know the days they occur and can monitor for proper healing.
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
## 2025-01-23 ENCOUNTER — HOSPITAL ENCOUNTER (EMERGENCY)
Facility: HOSPITAL | Age: 65
Discharge: NURSING, REHAB OR LONG-TERM CARE | End: 2025-01-23
Attending: EMERGENCY MEDICINE | Admitting: EMERGENCY MEDICINE
Payer: MEDICARE

## 2025-01-23 ENCOUNTER — MEDICAL CORRESPONDENCE (OUTPATIENT)
Dept: EMERGENCY MEDICINE | Facility: HOSPITAL | Age: 65
End: 2025-01-23

## 2025-01-23 VITALS
BODY MASS INDEX: 32.89 KG/M2 | HEART RATE: 65 BPM | TEMPERATURE: 97.5 F | OXYGEN SATURATION: 94 % | DIASTOLIC BLOOD PRESSURE: 71 MMHG | HEIGHT: 63 IN | RESPIRATION RATE: 18 BRPM | SYSTOLIC BLOOD PRESSURE: 146 MMHG | WEIGHT: 185.6 LBS

## 2025-01-23 DIAGNOSIS — S09.90XA INJURY OF HEAD, INITIAL ENCOUNTER: ICD-10-CM

## 2025-01-23 PROCEDURE — 99283 EMERGENCY DEPT VISIT LOW MDM: CPT

## 2025-01-23 RX ORDER — LORAZEPAM 1 MG/1
2 TABLET ORAL ONCE
Status: DISCONTINUED | OUTPATIENT
Start: 2025-01-23 | End: 2025-01-23

## 2025-01-23 ASSESSMENT — ENCOUNTER SYMPTOMS
DIZZINESS: 0
HEADACHES: 0

## 2025-01-23 ASSESSMENT — ACTIVITIES OF DAILY LIVING (ADL)
ADLS_ACUITY_SCORE: 41

## 2025-01-24 ASSESSMENT — ACTIVITIES OF DAILY LIVING (ADL)
ADLS_ACUITY_SCORE: 41

## 2025-01-24 NOTE — ED TRIAGE NOTES
Patient lives in  Group Home, here with 2 staff members.  Pt tripped, fell onto wooden floor- has abrasion to left forehead.  Pt did not have LOC, no blood thinners.,

## 2025-01-24 NOTE — ED PROVIDER NOTES
EMERGENCY DEPARTMENT ENCOUNTER      NAME: Michell Mccann  AGE: 64 year old female  YOB: 1960  MRN: [id number]  EVALUATION DATE & TIME: 1/23/2025  8:37 PM    PCP: Deion Muniz    ED PROVIDER: Jose R De La Vega MD      Chief Complaint   Patient presents with    Fall         FINAL IMPRESSION:  1. Injury of head, initial encounter          ED COURSE & MEDICAL DECISION MAKING:    Pertinent Labs & Imaging studies reviewed. (See chart for details)  64 year old female presents to the Emergency Department for evaluation of left forehead laceration that is well-approximated more over abrasion as well as contusion    ED Course as of 01/23/25 2206   Thu Jan 23, 2025 2049 I tried calling Chapis Ortiz,  of [employer]. Went to [employer] and they are only in office until 4:30PM. Called supervisor's number and left a message.    2110 Patient would not lay flat. She had an unstable gait at imaging.    2115 Group home staff says that patient usually sleeps flat on her back or side. Says that her unstable gait is also normal for her.    2124 I spoke with guardianDaniela    2131 Daniela, legal guardian, does not want to do any intervention or imaging and she understands risk factors of brain bleed.    2134 Patient's is nonverbal at baseline does ambulate at baseline with the unsteady gait.  Group home staff says her gait here is normal.  Group home staff his behavior here is normal.  Has an abrasion contusion left forehead.   2134 Discussed imaging with sedation with patient's legal guardian Daniela.     2135 Patient's guardian does not want to undergo sedation for imaging.  Understands I can miss fracture could lead to paralysis or brain bleed that could cause death.   2135 Reportedly schedule appointment tomorrow.  Given information for close head injury.   Discussed sedation for evaluation with legal guardian    Medical Decision Making  Obtained supplemental history:Supplemental  history obtained?: Documented in chart, Family Member/Significant Other, and Other: Group home staff  Reviewed external records: External records reviewed?: Documented in chart  Care impacted by chronic illness:Documented in Chart  Did you consider but not order tests?: Work up considered but not performed and documented in chart, if applicable  Did you interpret images independently?: Independent interpretation of ECG and images noted in documentation, when applicable.  Consultation discussion with other provider:Did you involve another provider (consultant, , pharmacy, etc.)?: No  Discharge. No recommendations on prescription strength medication(s). I considered admission, but ultimately discharged patient after discussion with legal guardian.    MIPS: Not Applicable            At the conclusion of the encounter I discussed the results of all of the tests and the disposition. The questions were answered. The patient or family acknowledged understanding and was agreeable with the care plan.         MEDICATIONS GIVEN IN THE EMERGENCY:  Medications - No data to display    NEW PRESCRIPTIONS STARTED AT TODAY'S ER VISIT  New Prescriptions    No medications on file          =================================================================    TRIAGE ASSESSMENT:  Patient lives in  Group Home, here with 2 staff members.  Pt tripped, fell onto wooden floor- has abrasion to left forehead.  Pt did not have LOC, no blood thinners.,             HPI    Patient information was obtained from: group home staff    Use of : N/A         Michelljohana Mccann is a 64 year old female with a pertinent history of seizure disorder, kyphosis dorsalis juvenilis, and osteopenia who presents to this ED via walk-in for evaluation of fall.     Per group home staff, patient is nonverbal. They reports that she was walking when she tripped on carpet and fell face down. She was able to get up and could walk after. She is not on blood thinners.  She is able to feed herself but also needs assistance sometimes. She is her normal self right now. Denies any other complaints at this time.       REVIEW OF SYSTEMS   Review of Systems   Neurological:  Negative for dizziness, syncope and headaches.        PAST MEDICAL HISTORY:  Past Medical History:   Diagnosis Date    Dislocation, shoulder     Chronic Right shoulder dislocation    Intermittent explosive disorder     Kyphosis dorsalis juvenilis     Osteopenia     Parapneumonic effusion     from PNA 1/2015    Profound mental retardation     Seizure disorder (H)     Seizure disorder (H)        PAST SURGICAL HISTORY:  Past Surgical History:   Procedure Laterality Date    EXAM UNDER ANESTHESIA, RESTORATIONS, EXTRACTION(S) DENTAL COMPLEX, COMBINED      EXAM UNDER ANESTHESIA, RESTORATIONS, EXTRACTION(S) DENTAL COMPLEX, COMBINED N/A 5/13/2016    Procedure: COMBINED EXAM UNDER ANESTHESIA, RESTORATIONS, EXTRACTION(S) DENTAL COMPLEX;  Surgeon: Scarlet Aragon DDS;  Location: [facility] OR    HH MIDLINE INSERTION  1/1/2015         THORACOSCOPY Right 12/28/2014    Procedure: ULTRASOUND GUIDED RIGHT THORACENTESIS;  Surgeon: Antonio Saunders MD;  Location: [facility];  Service:            CURRENT MEDICATIONS:    ARIPiprazole (ABILIFY) 5 MG tablet  calcium-vitamin D (CALTRATE) 600-400 MG-UNIT per tablet  carBAMazepine (TEGRETOL) 200 MG tablet  CARNITOR 330 MG OR TABS  CHLORHEXIDINE GLUCONATE 0.12 % MT SOLN  citalopram (CELEXA) 40 MG tablet  clonazePAM (KLONOPIN) 0.5 MG tablet  COLACE 100 MG OR CAPS  cycloSPORINE (RESTASIS) 0.05 % ophthalmic emulsion  DEPAKOTE 500 MG OR TBEC  diphenhydrAMINE (BENADRYL) 25 MG tablet  esomeprazole (NEXIUM) 20 MG DR capsule  esomeprazole (NEXIUM) 40 MG DR capsule  ibuprofen (ADVIL/MOTRIN) 200 MG tablet  melatonin 5 MG tablet  mirtazapine (REMERON) 30 MG tablet  Multiple Vitamin (TAB-A-TENZIN PO)  sennosides (SENOKOT) 8.6 MG tablet  zolpidem (AMBIEN) 10 MG tablet        ALLERGIES:  Allergies  "  Allergen Reactions    Drixoral [Brompheniramine-Pseudoeph]     Sudafed [Pseudoephedrine Hcl]        FAMILY HISTORY:  History reviewed. No pertinent family history.    SOCIAL HISTORY:   Social History     Socioeconomic History    Marital status: Single   Tobacco Use    Smoking status: Never    Smokeless tobacco: Never   Substance and Sexual Activity    Alcohol use: No    Drug use: No    Sexual activity: Never   Social History Narrative    The patient is non-verbal and lives at a group home.     Social Drivers of Health     Financial Resource Strain: Low Risk  (4/12/2022)    Received from [facility], [facility] [employer]  [facility]    Financial Resource Strain     Difficulty of Paying Living Expenses: 3   Food Insecurity: No Food Insecurity (4/12/2022)    Received from [facility] [facility], [facility] [employer] [facility]    Food Insecurity     Worried About Running Out of Food in the Last Year: 1   Transportation Needs: No Transportation Needs (4/12/2022)    Received from [facility], [employer] [facility] [employer] [facility] [address]    Transportation Needs     Lack of Transportation (Medical): 1    Received from [facility], [employer] [facility] [address]    Social Connections   Housing Stability: Low Risk  (4/12/2022)    [facility], [facility] & [facility] [address]    Housing Stability     Unable to Pay for Housing in the Last Year: 1       VITALS:  BP (!) 146/71   Pulse 65   Temp 97.5  F (36.4  C) (Temporal)   Resp 18   Ht 1.6 m (5' 3\")   Wt 84.2 kg (185 lb 9.6 oz)   SpO2 94%   BMI 32.88 kg/m      PHYSICAL EXAM      BP (!) 146/71   Pulse 65   Temp 97.5  F (36.4  C) (Temporal)   Resp 18   Ht 1.6 m (5' 3\")   Wt 84.2 kg (185 lb 9.6 oz)   SpO2 94%   BMI 32.88 kg/m      General " Appearance: Alert, cooperative, normal speech and facial symmetry,  appears stated age, sitting in chair comfortably    Primary survey:     Airway patent  Breath sounds: bilateral breath sounds  Cardiovascular: 2+ radial pulses and DP pulses  Disability GCS 14, baseline    Secondary survey    Head: Abrasion left brow, contusion left brow  Eyes: Disconjugate gaze baseline per group home staff  ENT:  No obvious facial deformity.  No tenderness to palpation.  No epistaxis. No evidence of orbital injury.    Neck:  No midline cervical spine tenderness.  No paraspinal tenderness.  Chest:  No tenderness or deformity, no crepitus  Cardio:  Regular rate and rhythm  Pulm:  Clear to auscultation bilaterally, respirations unlabored,   Back:   no CVA tenderness, no spinal tenderness  Abdomen:  Soft, non-tender, no rebound or guarding, no pelvic pain to compression  Extremities:  No obvious deformity, all joints palpated in place with full range of motion.  No tenderness or instability.  Patient is able to bear full weight, no tenderness over joints or extremities, no cyanosis or edema, full function and range of motion, pulses equal in all extremities, normal cap refill  Skin:  Skin color, texture, turgor normal, no rashes or lesions  Neuro:  Awake, alert,  No focal deficits, No gross motor weakness or sensory loss, moves all extremities, baseline ambulation    LAB:  All pertinent labs reviewed and interpreted.       RADIOLOGY:  Reviewed all pertinent imaging. Please see official radiology report.  No orders to display         IShirley, am serving as a scribe to document services personally performed by Jose R De La Vega MD based on my observation and the provider's statements to me. IJose R MD, attest that Shirley Silva is acting in a scribe capacity, has observed my performance of the services and has documented them in accordance with my direction.    Jose R De La Vega MD  [facility] EMERGENCY  DEPARTMENT  [address] [place] [id number]  [phone]       Jose R De La Vega MD  01/23/25 9516

## 2025-01-24 NOTE — ED NOTES
Per MEAGAN Parnell, the patient's family is refusing CT scan for the patient. The patient's caregivers are at bedside and updated regarding the care plan.

## 2025-01-24 NOTE — ED NOTES
CT called and stated the 2 group home staff members were with but CT staff tried to get patient onto the CT bed and wouldn't lay down and was walking around, with unsteady gait. Staff was able to get patient onto the CT bed but wouldn't lay flat.  Patient brought back to the room.

## 2025-01-28 ENCOUNTER — HOSPITAL ENCOUNTER (EMERGENCY)
Facility: HOSPITAL | Age: 65
Discharge: HOME | End: 2025-01-28
Attending: EMERGENCY MEDICINE | Admitting: EMERGENCY MEDICINE
Payer: MEDICARE

## 2025-01-28 ENCOUNTER — DOCUMENTATION ONLY (OUTPATIENT)
Dept: OTHER | Facility: CLINIC | Age: 65
End: 2025-01-28

## 2025-01-28 ENCOUNTER — APPOINTMENT (OUTPATIENT)
Dept: CT IMAGING | Facility: HOSPITAL | Age: 65
End: 2025-01-28
Attending: EMERGENCY MEDICINE
Payer: MEDICARE

## 2025-01-28 VITALS
RESPIRATION RATE: 12 BRPM | DIASTOLIC BLOOD PRESSURE: 59 MMHG | HEART RATE: 59 BPM | TEMPERATURE: 97.1 F | BODY MASS INDEX: 32.77 KG/M2 | OXYGEN SATURATION: 97 % | WEIGHT: 185 LBS | SYSTOLIC BLOOD PRESSURE: 131 MMHG

## 2025-01-28 DIAGNOSIS — S09.90XA INJURY OF HEAD, INITIAL ENCOUNTER: ICD-10-CM
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
## 2025-01-28 PROBLEM — U07.1 INFECTION DUE TO 2019 NOVEL CORONAVIRUS: Status: RESOLVED | Noted: 2022-01-18 | Resolved: 2025-01-28

## 2025-01-28 LAB
ANION GAP SERPL CALCULATED.3IONS-SCNC: 9 MMOL/L (ref 7–15)
BASOPHILS # BLD AUTO: 0 10E3/UL (ref 0–0.2)
BASOPHILS NFR BLD AUTO: 1 %
BUN SERPL-MCNC: 27.2 MG/DL (ref 8–23)
CALCIUM SERPL-MCNC: 9.4 MG/DL (ref 8.8–10.4)
CHLORIDE SERPL-SCNC: 105 MMOL/L (ref 98–107)
CREAT SERPL-MCNC: 0.47 MG/DL (ref 0.51–0.95)
EGFRCR SERPLBLD CKD-EPI 2021: >90 ML/MIN/1.73M2
EOSINOPHIL # BLD AUTO: 0.1 10E3/UL (ref 0–0.7)
EOSINOPHIL NFR BLD AUTO: 3 %
ERYTHROCYTE [DISTWIDTH] IN BLOOD BY AUTOMATED COUNT: 15.1 % (ref 10–15)
GLUCOSE SERPL-MCNC: 99 MG/DL (ref 70–99)
HCO3 SERPL-SCNC: 26 MMOL/L (ref 22–29)
HCT VFR BLD AUTO: 38.4 % (ref 35–47)
HGB BLD-MCNC: 12.7 G/DL (ref 11.7–15.7)
IMM GRANULOCYTES # BLD: 0 10E3/UL
IMM GRANULOCYTES NFR BLD: 0 %
LYMPHOCYTES # BLD AUTO: 0.9 10E3/UL (ref 0.8–5.3)
LYMPHOCYTES NFR BLD AUTO: 26 %
MCH RBC QN AUTO: 32.2 PG (ref 26.5–33)
MCHC RBC AUTO-ENTMCNC: 33.1 G/DL (ref 31.5–36.5)
MCV RBC AUTO: 97 FL (ref 78–100)
MONOCYTES # BLD AUTO: 0.3 10E3/UL (ref 0–1.3)
MONOCYTES NFR BLD AUTO: 8 %
NEUTROPHILS # BLD AUTO: 2.2 10E3/UL (ref 1.6–8.3)
NEUTROPHILS NFR BLD AUTO: 62 %
NRBC # BLD AUTO: 0 10E3/UL
NRBC BLD AUTO-RTO: 0 /100
PLATELET # BLD AUTO: 272 10E3/UL (ref 150–450)
POTASSIUM SERPL-SCNC: 4.2 MMOL/L (ref 3.4–5.3)
RBC # BLD AUTO: 3.95 10E6/UL (ref 3.8–5.2)
SODIUM SERPL-SCNC: 140 MMOL/L (ref 135–145)
WBC # BLD AUTO: 3.6 10E3/UL (ref 4–11)

## 2025-01-28 PROCEDURE — 85048 AUTOMATED LEUKOCYTE COUNT: CPT | Performed by: EMERGENCY MEDICINE

## 2025-01-28 PROCEDURE — 36415 COLL VENOUS BLD VENIPUNCTURE: CPT | Performed by: EMERGENCY MEDICINE

## 2025-01-28 PROCEDURE — 82310 ASSAY OF CALCIUM: CPT | Performed by: EMERGENCY MEDICINE

## 2025-01-28 PROCEDURE — 96372 THER/PROPH/DIAG INJ SC/IM: CPT | Performed by: EMERGENCY MEDICINE

## 2025-01-28 PROCEDURE — 70450 CT HEAD/BRAIN W/O DYE: CPT

## 2025-01-28 PROCEDURE — 72125 CT NECK SPINE W/O DYE: CPT

## 2025-01-28 PROCEDURE — 85004 AUTOMATED DIFF WBC COUNT: CPT | Performed by: EMERGENCY MEDICINE

## 2025-01-28 PROCEDURE — 99285 EMERGENCY DEPT VISIT HI MDM: CPT | Mod: 25

## 2025-01-28 PROCEDURE — 250N000011 HC RX IP 250 OP 636: Performed by: EMERGENCY MEDICINE

## 2025-01-28 PROCEDURE — 80048 BASIC METABOLIC PNL TOTAL CA: CPT | Performed by: EMERGENCY MEDICINE

## 2025-01-28 PROCEDURE — 84295 ASSAY OF SERUM SODIUM: CPT | Performed by: EMERGENCY MEDICINE

## 2025-01-28 RX ORDER — LORAZEPAM 2 MG/ML
1 INJECTION INTRAMUSCULAR ONCE
Status: COMPLETED | OUTPATIENT
Start: 2025-01-28 | End: 2025-01-28

## 2025-01-28 RX ORDER — HALOPERIDOL 5 MG/ML
5 INJECTION INTRAMUSCULAR ONCE
Status: COMPLETED | OUTPATIENT
Start: 2025-01-28 | End: 2025-01-28

## 2025-01-28 RX ORDER — LORAZEPAM 2 MG/ML
2 INJECTION INTRAMUSCULAR ONCE
Status: COMPLETED | OUTPATIENT
Start: 2025-01-28 | End: 2025-01-28

## 2025-01-28 RX ORDER — DIPHENHYDRAMINE HYDROCHLORIDE 50 MG/ML
25 INJECTION INTRAMUSCULAR; INTRAVENOUS ONCE
Status: COMPLETED | OUTPATIENT
Start: 2025-01-28 | End: 2025-01-28

## 2025-01-28 RX ADMIN — HALOPERIDOL LACTATE 5 MG: 5 INJECTION, SOLUTION INTRAMUSCULAR at 07:43

## 2025-01-28 RX ADMIN — LORAZEPAM 1 MG: 2 INJECTION INTRAMUSCULAR; INTRAVENOUS at 09:43

## 2025-01-28 RX ADMIN — LORAZEPAM 2 MG: 2 INJECTION INTRAMUSCULAR; INTRAVENOUS at 09:04

## 2025-01-28 RX ADMIN — DIPHENHYDRAMINE HYDROCHLORIDE 25 MG: 50 INJECTION INTRAMUSCULAR; INTRAVENOUS at 07:43

## 2025-01-28 ASSESSMENT — COLUMBIA-SUICIDE SEVERITY RATING SCALE - C-SSRS
1. IN THE PAST MONTH, HAVE YOU WISHED YOU WERE DEAD OR WISHED YOU COULD GO TO SLEEP AND NOT WAKE UP?: NO
6. HAVE YOU EVER DONE ANYTHING, STARTED TO DO ANYTHING, OR PREPARED TO DO ANYTHING TO END YOUR LIFE?: NO
2. HAVE YOU ACTUALLY HAD ANY THOUGHTS OF KILLING YOURSELF IN THE PAST MONTH?: NO

## 2025-01-28 ASSESSMENT — ACTIVITIES OF DAILY LIVING (ADL)
ADLS_ACUITY_SCORE: 41

## 2025-01-28 NOTE — ED TRIAGE NOTES
Pt lives in a group home and is with 2 caregivers. Pt is nonverbal. Pt was going from one chair to another today and she fell, hitting her left forehead oon the edge of a sofa. No loc. Not on thinners per caregiver.pt is acting normally for her.

## 2025-01-28 NOTE — ED PROVIDER NOTES
EMERGENCY DEPARTMENT ENCOUNTER      NAME: Michell Mccann  AGE: 64 year old female  YOB: 1960  MRN: [id number]  EVALUATION DATE & TIME: 2025  7:21 AM    PCP: Deion Muniz    ED PROVIDER: Wilmer Rogers M.D.      Chief Complaint   Patient presents with    Fall         FINAL IMPRESSION:  1. Injury of head, initial encounter    2. Fall, initial encounter          ED COURSE & MEDICAL DECISION MAKIN year old female presents to the Emergency Department for evaluation of fall.  Patient has a history of severe cognitive impairment and lives in a group home.  She is presenting with a fall and left forehead injury.  It sounds like she was able to catch herself somewhat before hitting her head on a chair that she was trying to transfer to.  Of note patient was seen here on  for a fairly  similar presentation of a frontal head injury and had been recovering well since that time.  Patient arrives to the emergency department vitally stable.  Per group home staff she is behaving normally.  There is no reported loss of consciousness.  She has a very superficial abrasion/laceration and a left frontal forehead hematoma.  No other significant trauma appreciated on exam.  I reviewed things with the patient's legal guardian/niece Daniela.  In light of the second visit within the span of a week, she did request that we try to obtain some screening labs for instability.  This revealed a normal metabolic profile, stable sodium levels and renal function.  Normal white blood cell count and stable hemoglobin.  He does not really sound like these falls or near syncopal in nature but sounded strictly mechanical.  Niece also requesting that we attempt neuroimaging given the multiple head strikes and the patient's age.  We did administer some intramuscular Haldol, Ativan, and attempted neuroimaging with a noncontrast head and cervical spine CT. ultimately after second dose of Ativan we were able to complete  the neuroimaging.  This shows no evidence of acute intracranial or cervical spine trauma.  Patient was returned to the emergency department, now fairly sleepy after sedation but able to maintain oxygen saturations, wake up and interact and was observed for more than an hour.  I reviewed the results of the workup with the patient's legal guardian.  She was satisfied with her evaluation here and everyone is comfortable with the plan to continue monitoring things back at her group home.  Patient was discharged in stable condition.    At the conclusion of the encounter I discussed the results of all of the tests and the disposition. The questions were answered. The patient or family acknowledged understanding and was agreeable with the care plan.       Medical Decision Making  Obtained supplemental history:Supplemental history obtained?: Caregiver  Reviewed external records: External records reviewed?: Documented in chart  Care impacted by chronic illness:Mental Health  Did you consider but not order tests?: Work up considered but not performed and documented in chart, if applicable  Did you interpret images independently?: Independent interpretation of ECG and images noted in documentation, when applicable.  Consultation discussion with other provider:Did you involve another provider (consultant, , pharmacy, etc.)?: No  Discharge. No recommendations on prescription strength medication(s). N/A.    MIPS: Not Applicable          MEDICATIONS GIVEN IN THE EMERGENCY:  Medications   haloperidol lactate (HALDOL) injection 5 mg (5 mg Intramuscular $Given 1/28/25 0743)   diphenhydrAMINE (BENADRYL) injection 25 mg (25 mg Intramuscular $Given 1/28/25 0743)   LORazepam (ATIVAN) injection 1 mg (1 mg Intramuscular Not Given 1/28/25 0832)   LORazepam (ATIVAN) injection 2 mg (2 mg Intramuscular $Given 1/28/25 0904)   LORazepam (ATIVAN) injection 1 mg (1 mg Intramuscular $Given 1/28/25 0943)       NEW PRESCRIPTIONS STARTED AT TODAY'S  ER VISIT  Discharge Medication List as of 1/28/2025 11:37 AM             =================================================================    HPI    Patient information was obtained from: Caregivers    Use of : N/A         Michell Mccann is a 64 year old female with a pertinent history of seizure disorder and mental retardation who presents to this ED via private vehicle for evaluation after fall.     Patient lives in a group home and presents with 2 caregivers today after having a fall while switching between chairs at 5:45 AM today. Patient is nonverbal at baseline, and usually has an unsteady gait. Caregivers state there was no loss of consciousness during the fall. Per caretakers, the patient seems at baseline currently.    No other complaints at this time.     Chart Review:  01/23/2025: Patient was seen at [facility] for evaluation of head injury after fall. She sustained an abrasion to her left forehead. Did not go through with a head CT at this time due to need for sedation and legal guardian, Daniela, decided against the scan. Patient was discharged back to care facility with instruction for coming back to ED with worsening symptoms.     REVIEW OF SYSTEMS   All systems reviewed and negative except as noted in HPI.    PAST MEDICAL HISTORY:  Past Medical History:   Diagnosis Date    Dislocation, shoulder     Chronic Right shoulder dislocation    Infection due to 2019 novel coronavirus 01/18/2022    Intermittent explosive disorder     Kyphosis dorsalis juvenilis     Osteopenia     Parapneumonic effusion     from PNA 1/2015    Profound mental retardation     Seizure disorder (H)     Seizure disorder (H)        PAST SURGICAL HISTORY:  Past Surgical History:   Procedure Laterality Date    EXAM UNDER ANESTHESIA, RESTORATIONS, EXTRACTION(S) DENTAL COMPLEX, COMBINED      EXAM UNDER ANESTHESIA, RESTORATIONS, EXTRACTION(S) DENTAL COMPLEX, COMBINED N/A 5/13/2016    Procedure:  COMBINED EXAM UNDER ANESTHESIA, RESTORATIONS, EXTRACTION(S) DENTAL COMPLEX;  Surgeon: Scarlet Aragon DDS;  Location: [facility] OR    [facility] MIDLINE INSERTION  1/1/2015         THORACOSCOPY Right 12/28/2014    Procedure: ULTRASOUND GUIDED RIGHT THORACENTESIS;  Surgeon: Antonio Saunders MD;  Location: [facility];  Service:            CURRENT MEDICATIONS:    No current facility-administered medications for this encounter.     Current Outpatient Medications   Medication Sig Dispense Refill    ARIPiprazole (ABILIFY) 5 MG tablet Take 1 tablet (5 mg) by mouth at bedtime. 31 tablet 5    calcium-vitamin D (CALTRATE) 600-400 MG-UNIT per tablet Take 1 tablet by mouth 2 times daily. This replaces the old calcium prescription 180 tablet 3    carBAMazepine (TEGRETOL) 200 MG tablet Take 200 mg by mouth 2 times daily Morning and at 3pm      CARNITOR 330 MG OR TABS Take 330 mg by mouth 3 times daily       CHLORHEXIDINE GLUCONATE 0.12 % MT SOLN SWABBED TO TEETH DAILY      citalopram (CELEXA) 40 MG tablet Take 1 tablet (40 mg) by mouth daily. 30 tablet 5    clonazePAM (KLONOPIN) 0.5 MG tablet TAKE 1 TABLET BY MOUTH AT BEDTIME *6 TOTAL FILLS* 31 tablet 5    COLACE 100 MG OR CAPS Take 200 mg by mouth 2 times daily  (Patient not taking: Reported on 9/5/2023)      cycloSPORINE (RESTASIS) 0.05 % ophthalmic emulsion Place 1 drop into both eyes every 12 hours.      DEPAKOTE 500 MG OR TBEC Take 1,000 mg by mouth 3 times daily       diphenhydrAMINE (BENADRYL) 25 MG tablet Give 50 mg (2 capsules) 1 hour before Bactrim doses. (Patient taking differently: Take 25 mg by mouth nightly as needed) 30 tablet 1    esomeprazole (NEXIUM) 20 MG DR capsule Take 40 mg by mouth every morning (before breakfast)       esomeprazole (NEXIUM) 40 MG DR capsule Take 20 mg by mouth daily In the afternoon      ibuprofen (ADVIL/MOTRIN) 200 MG tablet Take 800 mg by mouth every 8 hours as needed for mild pain      melatonin 5 MG tablet Take 2 tablets (10 mg) by mouth  At Bedtime 60 tablet 0    mirtazapine (REMERON) 30 MG tablet Take 1 tablet (30 mg) by mouth at bedtime. 31 tablet 5    Multiple Vitamin (TAB-A-TENZIN PO) Take 1 tablet by mouth daily.      sennosides (SENOKOT) 8.6 MG tablet Take 1 tablet by mouth 2 times daily      zolpidem (AMBIEN) 10 MG tablet TAKE 1 TABLET BY MOUTH AT BEDTIME  *4 TOTAL FILLS* 31 tablet 0         ALLERGIES:  Allergies   Allergen Reactions    Drixoral [Brompheniramine-Pseudoeph]     Sudafed [Pseudoephedrine Hcl]        FAMILY HISTORY:  No family history on file.    SOCIAL HISTORY:   Social History     Socioeconomic History    Marital status: Single   Tobacco Use    Smoking status: Never    Smokeless tobacco: Never   Substance and Sexual Activity    Alcohol use: No    Drug use: No    Sexual activity: Never   Social History Narrative    The patient is non-verbal and lives at a group home.     Social Drivers of Health     Financial Resource Strain: Low Risk  (4/12/2022)    [facility] [employer] [facility]    Financial Resource Strain     Difficulty of Paying Living Expenses: 3   Food Insecurity: No Food Insecurity (4/12/2022)    Received from [employer] [facility], [facility] [employer] [facility]    Food Insecurity     Worried About Running Out of Food in the Last Year: 1   Transportation Needs: No Transportation Needs (4/12/2022)    Received from [employer] [facility], [facility] & [facility]    Transportation Needs     Lack of Transportation (Medical): 1    Received from [employer] [facility], [facility] [employer] [facility]    Social Connections   Housing Stability: Low Risk  (4/12/2022)    Received from [employer] [facility], [facility] [employer] [facility]    Housing Stability     Unable to Pay for Housing in the Last Year: 1        VITALS:  /59   Pulse 59   Temp 97.1  F (36.2  C) (Tympanic)   Resp 12   Wt 83.9 kg (185 lb)   SpO2 97%   BMI 32.77 kg/m      PHYSICAL EXAM    Constitutional: Elderly female patient with developmental delay, sitting in chair, no acute distress  HENT: There is a 2 cm left forehead hematoma with a superficial less than 1 cm abrasion/laceration overlying this.  This is hemostatic.  There is no palpable skull defect or deformity.  Ranging neck without any discomfort.  No midline cervical spine tenderness.  Eyes: EOMI, Conjunctiva normal.  Respiratory: Breathing comfortably on room air. Speaks full sentences easily. Lungs clear to ascultation.  Cardiovascular: Normal heart rate, Regular rhythm. No peripheral edema.  Abdomen: Soft  Musculoskeletal: Good range of motion in all major joints. No major deformities noted.  Integument: Warm, Dry.  Neurologic: Alert and awake.  Nonverbal.  Moving all extremities purposefully and walking with an antalgic gait but stable with assistance.       LAB:  All pertinent labs reviewed and interpreted.  Labs Ordered and Resulted from Time of ED Arrival to Time of ED Departure   BASIC METABOLIC PANEL - Abnormal       Result Value    Sodium 140      Potassium 4.2      Chloride 105      Carbon Dioxide (CO2) 26      Anion Gap 9      Urea Nitrogen 27.2 (*)     Creatinine 0.47 (*)     GFR Estimate >90      Calcium 9.4      Glucose 99     CBC WITH PLATELETS AND DIFFERENTIAL - Abnormal    WBC Count 3.6 (*)     RBC Count 3.95      Hemoglobin 12.7      Hematocrit 38.4      MCV 97      MCH 32.2      MCHC 33.1      RDW 15.1 (*)     Platelet Count 272      % Neutrophils 62      % Lymphocytes 26      % Monocytes 8      % Eosinophils 3      % Basophils 1      % Immature Granulocytes 0      NRBCs per 100 WBC 0      Absolute Neutrophils 2.2      Absolute Lymphocytes 0.9      Absolute Monocytes 0.3      Absolute Eosinophils 0.1      Absolute Basophils 0.0      Absolute Immature Granulocytes  0.0      Absolute NRBCs 0.0         RADIOLOGY:  Reviewed all pertinent imaging. Please see official radiology report.  CT Cervical Spine w/o Contrast   Final Result   IMPRESSION:   HEAD CT:   1.  No acute intracranial findings.      CERVICAL SPINE CT:   1.  No evidence of acute cervical fracture.      Head CT w/o contrast   Final Result   IMPRESSION:   HEAD CT:   1.  No acute intracranial findings.      CERVICAL SPINE CT:   1.  No evidence of acute cervical fracture.            I, Archie Robles, am serving as a scribe to document services personally performed by Dr. Wilmer Rogers, based on my observation and the provider's statements to me. I, Wilmer Rogers MD attest that Archie Robles is acting in a scribe capacity, has observed my performance of the services and has documented them in accordance with my direction.    Wilmer Rogers M.D.  Emergency Medicine  [facility] EMERGENCY DEPARTMENT  [address] [place] [id number]  [phone]  Dept: [phone]       Wilmer Rogers MD  01/28/25 1040

## 2025-01-28 NOTE — DISCHARGE INSTRUCTIONS
Michell was evaluated in the emergency department for a fall.  We completed some trauma imaging of her head and neck which was negative.  We also completed some lab evaluations which look stable.  We would expect her to potentially be fairly sleepy for the rest of the day today with the amount of medication that she needed to complete the imaging.  If there are any lingering concerns after this ED visit we can have her follow-up in clinic.

## 2025-03-12 PROCEDURE — 99284 EMERGENCY DEPT VISIT MOD MDM: CPT | Performed by: EMERGENCY MEDICINE

## 2025-03-12 PROCEDURE — 99283 EMERGENCY DEPT VISIT LOW MDM: CPT | Performed by: EMERGENCY MEDICINE

## 2025-03-13 ENCOUNTER — HOSPITAL ENCOUNTER (EMERGENCY)
Facility: CLINIC | Age: 65
Discharge: HOME | End: 2025-03-13
Attending: EMERGENCY MEDICINE
Payer: MEDICARE

## 2025-03-13 VITALS
WEIGHT: 175.1 LBS | BODY MASS INDEX: 31.02 KG/M2 | OXYGEN SATURATION: 96 % | RESPIRATION RATE: 16 BRPM | DIASTOLIC BLOOD PRESSURE: 74 MMHG | HEART RATE: 58 BPM | SYSTOLIC BLOOD PRESSURE: 108 MMHG

## 2025-03-13 DIAGNOSIS — N39.0 URINARY TRACT INFECTION WITHOUT HEMATURIA, SITE UNSPECIFIED: ICD-10-CM

## 2025-03-13 LAB
ALBUMIN UR-MCNC: 10 MG/DL
APPEARANCE UR: CLEAR
BACTERIA #/AREA URNS HPF: ABNORMAL /HPF
BILIRUB UR QL STRIP: NEGATIVE
COLOR UR AUTO: YELLOW
GLUCOSE UR STRIP-MCNC: NEGATIVE MG/DL
HGB UR QL STRIP: NEGATIVE
KETONES UR STRIP-MCNC: ABNORMAL MG/DL
LEUKOCYTE ESTERASE UR QL STRIP: ABNORMAL
MUCOUS THREADS #/AREA URNS LPF: PRESENT /LPF
NITRATE UR QL: POSITIVE
PH UR STRIP: 6.5 [PH] (ref 5–7)
RBC URINE: 2 /HPF
SP GR UR STRIP: 1.03 (ref 1–1.03)
SQUAMOUS EPITHELIAL: 1 /HPF
UROBILINOGEN UR STRIP-MCNC: NORMAL MG/DL
WBC URINE: 14 /HPF

## 2025-03-13 PROCEDURE — 81003 URINALYSIS AUTO W/O SCOPE: CPT | Performed by: EMERGENCY MEDICINE

## 2025-03-13 PROCEDURE — 87186 SC STD MICRODIL/AGAR DIL: CPT | Performed by: EMERGENCY MEDICINE

## 2025-03-13 PROCEDURE — 87086 URINE CULTURE/COLONY COUNT: CPT | Performed by: EMERGENCY MEDICINE

## 2025-03-13 PROCEDURE — 99283 EMERGENCY DEPT VISIT LOW MDM: CPT | Performed by: EMERGENCY MEDICINE

## 2025-03-13 RX ORDER — NITROFURANTOIN 25; 75 MG/1; MG/1
100 CAPSULE ORAL 2 TIMES DAILY
Qty: 14 CAPSULE | Refills: 0 | Status: SHIPPED | OUTPATIENT
Start: 2025-03-13

## 2025-03-13 ASSESSMENT — COLUMBIA-SUICIDE SEVERITY RATING SCALE - C-SSRS: IS THE PATIENT NOT ABLE TO COMPLETE C-SSRS: UNABLE TO VERBALIZE

## 2025-03-13 ASSESSMENT — ACTIVITIES OF DAILY LIVING (ADL): ADLS_ACUITY_SCORE: 41

## 2025-03-14 NOTE — ED PROVIDER NOTES
[facility] EMERGENCY DEPARTMENT ([facility])    3/13/25      ED PROVIDER NOTE    History     Chief Complaint   Patient presents with    Fall     The history is provided by a caregiver and medical records.     Michell Mccann is a 64 year old female with a history notable for severe cognitive impairment and seizures who presents to the ED from shelter for evaluation following a fall. Patient is nonverbal at baseline so group home staff provide history. They report that she fell when going from a seated to standing position, hitting her forehead on the wooden floor. The fall was witnessed. She did not lose consciousness. She is not anticoagulated. No obvious cuts or bleeding. No vomiting. She has otherwise been acting normally, does not appear to be in pain. Staff note that for the past month or so she has been more unsteady than usual, requiring some assistance with ambulating, when in the past she has been able to ambulate on her own. No recent seizures.    Past Medical History  Past Medical History:   Diagnosis Date    Dislocation, shoulder     Chronic Right shoulder dislocation    Infection due to 2019 novel coronavirus 01/18/2022    Intermittent explosive disorder     Kyphosis dorsalis juvenilis     Osteopenia     Parapneumonic effusion     from PNA 1/2015    Profound mental retardation     Seizure disorder (H)     Seizure disorder (H)      Past Surgical History:   Procedure Laterality Date    EXAM UNDER ANESTHESIA, RESTORATIONS, EXTRACTION(S) DENTAL COMPLEX, COMBINED      EXAM UNDER ANESTHESIA, RESTORATIONS, EXTRACTION(S) DENTAL COMPLEX, COMBINED N/A 5/13/2016    Procedure: COMBINED EXAM UNDER ANESTHESIA, RESTORATIONS, EXTRACTION(S) DENTAL COMPLEX;  Surgeon: Scarlet Aragon DDS;  Location: [facility] MIDLINE INSERTION  1/1/2015         THORACOSCOPY Right 12/28/2014    Procedure: ULTRASOUND GUIDED RIGHT THORACENTESIS;  Surgeon: Antonio Saunders MD;  Location: [facility];  Service:       ARIPiprazole (ABILIFY) 5 MG tablet  calcium-vitamin D (CALTRATE) 600-400 MG-UNIT per tablet  carBAMazepine (TEGRETOL) 200 MG tablet  CARNITOR 330 MG OR TABS  CHLORHEXIDINE GLUCONATE 0.12 % MT SOLN  citalopram (CELEXA) 40 MG tablet  clonazePAM (KLONOPIN) 0.5 MG tablet  COLACE 100 MG OR CAPS  cycloSPORINE (RESTASIS) 0.05 % ophthalmic emulsion  DEPAKOTE 500 MG OR TBEC  diphenhydrAMINE (BENADRYL) 25 MG tablet  esomeprazole (NEXIUM) 20 MG DR capsule  esomeprazole (NEXIUM) 40 MG DR capsule  ibuprofen (ADVIL/MOTRIN) 200 MG tablet  melatonin 5 MG tablet  mirtazapine (REMERON) 30 MG tablet  Multiple Vitamin (TAB-A-TENZIN PO)  sennosides (SENOKOT) 8.6 MG tablet  zolpidem (AMBIEN) 10 MG tablet      Allergies   Allergen Reactions    Drixoral [Brompheniramine-Pseudoeph]     Sudafed [Pseudoephedrine Hcl]      Family History  No family history on file.  Social History   Social History     Tobacco Use    Smoking status: Never    Smokeless tobacco: Never   Substance Use Topics    Alcohol use: No    Drug use: No      Past medical history, past surgical history, medications, allergies, family history, and social history were reviewed with the patient. No additional pertinent items.      ROS: 14 point ROS neg other than the symptoms noted above in the HPI.    Physical Exam   BP: 108/74  Pulse: 58  Temp:  (unable to obtain)  Resp: 16  Weight: 79.4 kg (175 lb 1.6 oz)  SpO2: 96 %    Physical Exam  Physical Exam   Constitutional: oriented to person, place, and time. appears well-developed and well-nourished.   HENT:   Head: Small abrasion to the forehead, no lacerations.  No significant tenderness.  Tympanic Mem is normal.  No ecchymosis.  Neck: Normal range of motion.  No tenderness over the C-spine.  Pulmonary/Chest: Effort normal. No respiratory distress.  Clear lungs bilaterally.  Cardiac: No murmurs, rubs, gallops. RRR.  Abdominal: Abdomen soft, nontender, nondistended. No rebound tenderness.  MSK: Long bones without deformity or  evidence of trauma  Neurological: Alert.  Moves all extremities.  Pupils 3 mm and reactive.  Skin: Skin is warm and dry.   Psychiatric:  normal mood and affect.  behavior is normal. Thought content normal.     ED Course, Procedures, & Data      Procedures            Results for orders placed or performed during the hospital encounter of 03/13/25   UA with Microscopic reflex to Culture     Status: Abnormal    Specimen: Urine, Catheter   Result Value Ref Range    Color Urine Yellow Colorless, Straw, Light Yellow, Yellow    Appearance Urine Clear Clear    Glucose Urine Negative Negative mg/dL    Bilirubin Urine Negative Negative    Ketones Urine Trace (A) Negative mg/dL    Specific Gravity Urine 1.031 1.003 - 1.035    Blood Urine Negative Negative    pH Urine 6.5 5.0 - 7.0    Protein Albumin Urine 10 (A) Negative mg/dL    Urobilinogen Urine Normal Normal, 2.0 mg/dL    Nitrite Urine Positive (A) Negative    Leukocyte Esterase Urine Small (A) Negative    Bacteria Urine Few (A) None Seen /HPF    Mucus Urine Present (A) None Seen /LPF    RBC Urine 2 <=2 /HPF    WBC Urine 14 (H) <=5 /HPF    Squamous Epithelials Urine 1 <=1 /HPF    Narrative    Urine Culture ordered based on laboratory criteria     Medications - No data to display  Labs Ordered and Resulted from Time of ED Arrival to Time of ED Departure - No data to display  No orders to display          Critical care was not performed.     Medical Decision Making  The patient's presentation was of moderate complexity (an acute complicated injury).    The patient's evaluation involved:  an assessment requiring an independent historian (group home workers)  strong consideration of a test (CT head) that was ultimately deferred  ordering and/or review of 1 test(s) in this encounter (see separate area of note for details)    The patient's management necessitated moderate risk (prescription drug management including medications given in the ED).    Assessment & Plan    Patient  resenting with fall and question of urinary tract infection.  She may have a mild urinary tract infection.  I discussed with the guardian, she discussed that she does get Pelaez urine with UTIs so we will place her on antibiotics.  I did review last cultures.  Macrobid sent to the pharmacy.  Vitals here are stable.  Very unlikely sepsis or pyelonephritis.  Patient otherwise appears well and is at her baseline.  She did have ground-level fall and has a small abrasion to the forehead otherwise does not appear to be in any distress and there are no focal deficits on exam.  I did discuss a CT scan with patient's guardian.  Guardian would like to defer and I do feel this is reasonable as this was a fairly minimal trauma with otherwise well-appearing patient, no loss of consciousness or focal deficits.  The patient is not on blood thinners.  Discussed they can return if they have any further concerns.    I have reviewed the nursing notes. I have reviewed the findings, diagnosis, plan and need for follow up with the patient.    New Prescriptions    No medications on file       Final diagnoses:   Urinary tract infection without hematuria, site unspecified     I, Dennis Valentine, am serving as a trained medical scribe to document services personally performed by Archie Arteaga MD based on the provider's statements to me on March 13, 2025.  This document has been checked and approved by the attending provider.    I, Archie Arteaga MD, was physically present and have reviewed and verified the accuracy of this note documented by Dennis Valentine medical scribe.      Archie Arteaga MD  [facility] EMERGENCY DEPARTMENT  3/13/2025     Archie Arteaga MD  03/13/25 2044

## 2025-03-14 NOTE — ED TRIAGE NOTES
Patient arrives via care staff, patient is profoundly disabled and non verbal. Per care staff patient had a fall at 1800 in the sitting room hitting her head on the floor. Care staff state it was witnessed, no LOC. Patient has a contusion on her forehead. Care staff reports she has been falling more frequently and has been walking more unsteady than usual. Care staff reports urine has had a strong odor.     Patient is not on blood thinner     Triage Assessment (Adult)       Row Name 03/13/25 1936          Triage Assessment    Airway WDL WDL        Respiratory WDL    Respiratory WDL WDL        Skin Circulation/Temperature WDL    Skin Circulation/Temperature WDL WDL        Cardiac WDL    Cardiac WDL WDL        Peripheral/Neurovascular WDL    Peripheral Neurovascular WDL WDL        Cognitive/Neuro/Behavioral WDL    Cognitive/Neuro/Behavioral WDL WDL

## 2025-03-17 LAB
BACTERIA UR CULT: ABNORMAL
BACTERIA UR CULT: ABNORMAL

## 2025-03-18 ENCOUNTER — TELEPHONE (OUTPATIENT)
Dept: NURSING | Facility: CLINIC | Age: 65
End: 2025-03-18
Payer: MEDICARE

## 2025-03-18 NOTE — TELEPHONE ENCOUNTER
[facility] ([facility])    Reason for call: Lab Result Notification     Lab Result (including Rx patient on, if applicable).  If culture, copy of lab report at bottom.  Lab Result: Urine Culture  3/13/25 Prescribed NitroFURantoin macrocrystal-monohydrate (MACROBID) 100 MG capsule Take 1 capsule (100 mg) by mouth 2 times daily. - Oral (SUSCEPTIBLE)    Creatinine Level (mg/dl)   Creatinine   Date Value Ref Range Status   01/28/2025 0.47 (L) 0.51 - 0.95 mg/dL Final   12/26/2014 0.54 0.52 - 1.04 mg/dL Final    Creatinine clearance (ml/min), if applicable    Serum creatinine: 0.47 mg/dL (L) 01/28/25 0754  Estimated creatinine clearance: 117.5 mL/min (A)     RN Recommendations/Instructions per [place] ED lab result protocol:   [facility] ED lab result protocol utilized: Urine Culture      Patient's current Symptoms:   Unable to assess.  Pt resides in Group Home; per staff Urine Culture results faxed to [phone]       Leticia Joseph RN

## 2025-05-31 ENCOUNTER — HOSPITAL ENCOUNTER (EMERGENCY)
Facility: CLINIC | Age: 65
Discharge: HOME | End: 2025-05-31
Attending: EMERGENCY MEDICINE | Admitting: EMERGENCY MEDICINE
Payer: MEDICARE

## 2025-05-31 ENCOUNTER — APPOINTMENT (OUTPATIENT)
Dept: GENERAL RADIOLOGY | Facility: CLINIC | Age: 65
End: 2025-05-31
Attending: EMERGENCY MEDICINE
Payer: MEDICARE

## 2025-05-31 VITALS
TEMPERATURE: 99 F | WEIGHT: 175 LBS | RESPIRATION RATE: 18 BRPM | BODY MASS INDEX: 31 KG/M2 | OXYGEN SATURATION: 98 % | HEART RATE: 89 BPM

## 2025-05-31 DIAGNOSIS — M79.604 RIGHT LEG PAIN: ICD-10-CM

## 2025-05-31 DIAGNOSIS — M25.461 EFFUSION OF RIGHT KNEE: ICD-10-CM

## 2025-05-31 PROCEDURE — 73502 X-RAY EXAM HIP UNI 2-3 VIEWS: CPT

## 2025-05-31 PROCEDURE — 73610 X-RAY EXAM OF ANKLE: CPT | Mod: RT

## 2025-05-31 PROCEDURE — 73630 X-RAY EXAM OF FOOT: CPT | Mod: RT

## 2025-05-31 PROCEDURE — 99283 EMERGENCY DEPT VISIT LOW MDM: CPT

## 2025-05-31 PROCEDURE — 73560 X-RAY EXAM OF KNEE 1 OR 2: CPT | Mod: RT

## 2025-05-31 PROCEDURE — 250N000013 HC RX MED GY IP 250 OP 250 PS 637: Performed by: EMERGENCY MEDICINE

## 2025-05-31 RX ORDER — ACETAMINOPHEN 500 MG
1000 TABLET ORAL ONCE
Status: COMPLETED | OUTPATIENT
Start: 2025-05-31 | End: 2025-05-31

## 2025-05-31 RX ORDER — IBUPROFEN 600 MG/1
600 TABLET, FILM COATED ORAL ONCE
Status: COMPLETED | OUTPATIENT
Start: 2025-05-31 | End: 2025-05-31

## 2025-05-31 RX ORDER — IBUPROFEN 600 MG/1
600 TABLET, FILM COATED ORAL EVERY 6 HOURS PRN
Qty: 30 TABLET | Refills: 0 | Status: SHIPPED | OUTPATIENT
Start: 2025-05-31

## 2025-05-31 RX ADMIN — IBUPROFEN 600 MG: 600 TABLET ORAL at 15:32

## 2025-05-31 RX ADMIN — ACETAMINOPHEN 1000 MG: 500 TABLET ORAL at 15:31

## 2025-05-31 ASSESSMENT — ACTIVITIES OF DAILY LIVING (ADL)
ADLS_ACUITY_SCORE: 41

## 2025-05-31 NOTE — DISCHARGE INSTRUCTIONS
1. -Take acetaminophen 500 to 1000 mg by mouth every 4 to 6 hours as needed for pain or fever.  Do not take more than 4000 mg in 24 hours.  Do not take within 6 hours of another acetaminophen containing medication such as norco (vicodin) or percocet.  - Take ibuprofen 600 to 800 mg by mouth every 6 to 8 hours as needed for pain or fever  2. Use ice as needed for pain and swelling.  3. Elevate extremity to help with swelling.  4. Follow-up with your primary doctor as needed.  5. You may return to the ED as needed for new or worsening symptoms such as reinjury, severe and uncontrollable pain, focal weakness, severe numbness and tingling, any other concerning symptoms.

## 2025-05-31 NOTE — ED TRIAGE NOTES
staff report noticing a limp this morning. Staff believe pt may have been sitting in a chair for too long. Pt is nonverbal.      Triage Assessment (Adult)       Row Name 05/31/25 1407          Triage Assessment    Airway WDL WDL        Respiratory WDL    Respiratory WDL WDL        Skin Circulation/Temperature WDL    Skin Circulation/Temperature WDL WDL        Cardiac WDL    Cardiac WDL WDL        Peripheral/Neurovascular WDL    Peripheral Neurovascular WDL WDL        Cognitive/Neuro/Behavioral WDL    Cognitive/Neuro/Behavioral WDL WDL

## 2025-05-31 NOTE — ED NOTES
Pt Guardian Daniela Rincon called by the writer and informed that the pt was here and being evaluated for leg pain.

## 2025-05-31 NOTE — ED PROVIDER NOTES
Emergency Department Note      History of Present Illness     Chief Complaint   Leg Pain      HPI   Michell Mccann is a 65 year old female with a past medical history significant for seizure disorder renal failure, osteopenia, and Sjogren's syndrome who presents to the emergency department with group home staff for evaluation of leg pain. Patient is nonverbal at baseline.Group home staff report that earlier this morning, they noticed that she was having increased difficulty with ambulation. She is normally able to lift herself off of the couch or out of her chair without assistance, but needed help with doing so this morning. They also noticed a right leg limp with ambulation, although they are unsure of what part of her right extremity is the pain generator. She is able to weight bear without difficulty. She does not normally use a wheelchair at home, but is very reliant on this while in the ED. She has not taken any over the counter medications to manage her pain. She has not had any falls or injuries recently, although she does have a known history of frequent falls.     Independent Historian   Group Home Staff as detailed above. Patient is nonverbal, and group home staff provide primary history at bedside.    Review of External Notes   I reviewed the family medicine note from 5/14/25, for which the patient was seen for evaluation of ear problem and diarrhea.    Past Medical History     Medical History and Problem List   Dislocation, shoulder  Infection due to 2019 novel coronavirus  Intermittent explosive disorder  Kyphosis dorsalis juvenilis  Osteopenia  Parapneumonic effusion  Profound mental retardation  Seizure disorder  LT 5th base of middle phalanx Avulsion Fracture  Osteopenia  Sepsis  Pneumonia  Acute renal failure  Anemia  Hypoxemia  GERD  Osteopenia  Carnitine deficiency  Sjogren's syndrome    Medications   Aripiprazole  carbamazepine  citalopram    clonazepam  depakote  diphenhydramine  esomeprazole  mirtazapine  nitrofurantoin macrocrystal-monohydrate  sennosides  Zolpidem  Mirtazapine  levocarnitine    Surgical History   Thoracoscopy  Dental extraction    Physical Exam     Patient Vitals for the past 24 hrs:   Temp Temp src Pulse Resp SpO2 Weight   05/31/25 1400 99  F (37.2  C) Temporal 89 18 98 % 79.4 kg (175 lb)     Physical Exam  Constitutional: Well developed, nontox appearance  Head: Atraumatic.   Mouth/Throat: Oropharynx is clear and moist.   Neck:  no stridor  Eyes: no scleral icterus  Cardiovascular: RRR, 2+ right DP pulse  Pulmonary/Chest: nml resp effort  Ext: Warm, well perfused, no edema  RLE; no obvious deformities or crepitance, no reproducible tenderness, full passive range of motion, no erythema or edema, distal CMS intact, no lacerations  Neurological: Baseline cognitive impairment, nonverbal, symmetric facies, moves ext x4, limping when ambulating  Skin: Skin is warm and dry.   Psychiatric: Behavior is normal. Thought content normal.   Nursing note and vitals reviewed.      Diagnostics     Lab Results   Labs Ordered and Resulted from Time of ED Arrival to Time of ED Departure - No data to display    Imaging   XR Pelvis and Hip Right 1 View   Final Result   IMPRESSION: No acute fracture involving the right hip or pelvis. Preserved joint alignment. Mild degenerative joint space narrowing in both hips. Additional degenerative changes the lower lumbar spine and SI joints. Diffuse osseous demineralization.      XR Knee Right 1/2 Views   Final Result   IMPRESSION: Normal joint spaces and alignment. No fracture. Small knee effusion.      Foot  XR, G/E 3 views, right   Final Result   IMPRESSION: No acute foot or ankle fracture. Intact ankle mortise and distal syndesmosis. Old healed posttraumatic deformity of the middle phalanx of the fourth toe. Preserved joint alignment. Multifocal mild degenerative arthritis in the midfoot and    forefoot.  Osseous demineralization. Mild forefoot soft tissue swelling.      Ankle XR, G/E 3 views, right   Final Result   IMPRESSION: No acute foot or ankle fracture. Intact ankle mortise and distal syndesmosis. Old healed posttraumatic deformity of the middle phalanx of the fourth toe. Preserved joint alignment. Multifocal mild degenerative arthritis in the midfoot and    forefoot. Osseous demineralization. Mild forefoot soft tissue swelling.          EKG   None    Independent Interpretation   Right lower extremity x-rays without obvious fracture or deformity    ED Course      Medications Administered   Medications   acetaminophen (TYLENOL) tablet 1,000 mg (1,000 mg Oral $Given 5/31/25 1531)   ibuprofen (ADVIL/MOTRIN) tablet 600 mg (600 mg Oral $Given 5/31/25 1532)       Procedures   Procedures     Discussion of Management   None    ED Course   ED Course as of 05/31/25 1924   Sat May 31, 2025   1437 I obtained history and examined the patient as noted above.         Additional Documentation  Social determinants include age increasing risk for presentation to the emergency department, group home resident  Medical Decision Making / Diagnosis     CMS Diagnoses: None    MIPS   None               [facility]   Michell Mccann is a 65 year old female presenting with right-sided limp    Differential diagnosis includes contusion, arthritis, fracture, dislocation.  Doubt septic arthritis, cellulitis, acute arterial insufficiency given history and physical exam.  X-rays negative for acute osseous abnormality although knee x-ray did demonstrate effusion which may indicate a soft tissue injury.  The patient is able to ambulate although with a limp.  Medications given as noted above.  Medications prescribed as noted below.  Given reassuring workup, I feel the patient is safe for discharge.  Ibuprofen prescribed.  Group home staff counseled on results, diagnosis, disposition and treatment at home.  They are understanding agreeable to plan.   Written instructions provided.  Patient discharged in stable condition.  Shortly after discharge, as noted the patient's blood pressure was intermittently not taken.  She is warm well-perfused with stable heart rate and no clinical findings of hypotension or hypertensive emergency.    Disposition   The patient was discharged.     Diagnosis     ICD-10-CM    1. Right leg pain  M79.604       2. Effusion of right knee  M25.461            Discharge Medications   Discharge Medication List as of 5/31/2025  4:46 PM            Scribe Disclosure:  I, Aleyda Arteaga, am serving as a scribe at 2:45 PM on 5/31/2025 to document services personally performed by Christian Bhakta MD based on my observations and the provider's statements to me.        Christian Bhakta MD  05/31/25 1925